# Patient Record
Sex: MALE | Race: OTHER | HISPANIC OR LATINO | URBAN - METROPOLITAN AREA
[De-identification: names, ages, dates, MRNs, and addresses within clinical notes are randomized per-mention and may not be internally consistent; named-entity substitution may affect disease eponyms.]

---

## 2024-04-25 ENCOUNTER — INPATIENT (INPATIENT)
Facility: HOSPITAL | Age: 59
LOS: 8 days | Discharge: HOME | DRG: 64 | End: 2024-05-04
Attending: PSYCHIATRY & NEUROLOGY | Admitting: PSYCHIATRY & NEUROLOGY
Payer: COMMERCIAL

## 2024-04-25 ENCOUNTER — TRANSCRIPTION ENCOUNTER (OUTPATIENT)
Age: 59
End: 2024-04-25

## 2024-04-25 VITALS — WEIGHT: 194.89 LBS

## 2024-04-25 DIAGNOSIS — I61.9 NONTRAUMATIC INTRACEREBRAL HEMORRHAGE, UNSPECIFIED: ICD-10-CM

## 2024-04-25 LAB
A1C WITH ESTIMATED AVERAGE GLUCOSE RESULT: 5.8 % — HIGH (ref 4–5.6)
ALBUMIN SERPL ELPH-MCNC: 3.9 G/DL — SIGNIFICANT CHANGE UP (ref 3.3–5.2)
ALP SERPL-CCNC: 86 U/L — SIGNIFICANT CHANGE UP (ref 40–120)
ALT FLD-CCNC: 14 U/L — SIGNIFICANT CHANGE UP
ANION GAP SERPL CALC-SCNC: 13 MMOL/L — SIGNIFICANT CHANGE UP (ref 5–17)
APTT BLD: 29.4 SEC — SIGNIFICANT CHANGE UP (ref 24.5–35.6)
AST SERPL-CCNC: 22 U/L — SIGNIFICANT CHANGE UP
BASOPHILS # BLD AUTO: 0.05 K/UL — SIGNIFICANT CHANGE UP (ref 0–0.2)
BASOPHILS NFR BLD AUTO: 0.7 % — SIGNIFICANT CHANGE UP (ref 0–2)
BILIRUB SERPL-MCNC: 0.3 MG/DL — LOW (ref 0.4–2)
BLD GP AB SCN SERPL QL: SIGNIFICANT CHANGE UP
BUN SERPL-MCNC: 19.3 MG/DL — SIGNIFICANT CHANGE UP (ref 8–20)
CALCIUM SERPL-MCNC: 8.8 MG/DL — SIGNIFICANT CHANGE UP (ref 8.4–10.5)
CHLORIDE SERPL-SCNC: 104 MMOL/L — SIGNIFICANT CHANGE UP (ref 96–108)
CHOLEST SERPL-MCNC: 156 MG/DL — SIGNIFICANT CHANGE UP
CO2 SERPL-SCNC: 23 MMOL/L — SIGNIFICANT CHANGE UP (ref 22–29)
CREAT SERPL-MCNC: 0.86 MG/DL — SIGNIFICANT CHANGE UP (ref 0.5–1.3)
EGFR: 102 ML/MIN/1.73M2 — SIGNIFICANT CHANGE UP
EOSINOPHIL # BLD AUTO: 0.18 K/UL — SIGNIFICANT CHANGE UP (ref 0–0.5)
EOSINOPHIL NFR BLD AUTO: 2.7 % — SIGNIFICANT CHANGE UP (ref 0–6)
ESTIMATED AVERAGE GLUCOSE: 120 MG/DL — HIGH (ref 68–114)
GLUCOSE BLDC GLUCOMTR-MCNC: 105 MG/DL — HIGH (ref 70–99)
GLUCOSE BLDC GLUCOMTR-MCNC: 111 MG/DL — HIGH (ref 70–99)
GLUCOSE BLDC GLUCOMTR-MCNC: 93 MG/DL — SIGNIFICANT CHANGE UP (ref 70–99)
GLUCOSE BLDC GLUCOMTR-MCNC: 99 MG/DL — SIGNIFICANT CHANGE UP (ref 70–99)
GLUCOSE SERPL-MCNC: 118 MG/DL — HIGH (ref 70–99)
HCT VFR BLD CALC: 41.8 % — SIGNIFICANT CHANGE UP (ref 39–50)
HDLC SERPL-MCNC: 48 MG/DL — SIGNIFICANT CHANGE UP
HGB BLD-MCNC: 14.1 G/DL — SIGNIFICANT CHANGE UP (ref 13–17)
IMM GRANULOCYTES NFR BLD AUTO: 0.1 % — SIGNIFICANT CHANGE UP (ref 0–0.9)
INR BLD: 1.03 RATIO — SIGNIFICANT CHANGE UP (ref 0.85–1.18)
LIPID PNL WITH DIRECT LDL SERPL: 96 MG/DL — SIGNIFICANT CHANGE UP
LYMPHOCYTES # BLD AUTO: 1.56 K/UL — SIGNIFICANT CHANGE UP (ref 1–3.3)
LYMPHOCYTES # BLD AUTO: 23.1 % — SIGNIFICANT CHANGE UP (ref 13–44)
MCHC RBC-ENTMCNC: 29.7 PG — SIGNIFICANT CHANGE UP (ref 27–34)
MCHC RBC-ENTMCNC: 33.7 GM/DL — SIGNIFICANT CHANGE UP (ref 32–36)
MCV RBC AUTO: 88.2 FL — SIGNIFICANT CHANGE UP (ref 80–100)
MONOCYTES # BLD AUTO: 0.67 K/UL — SIGNIFICANT CHANGE UP (ref 0–0.9)
MONOCYTES NFR BLD AUTO: 9.9 % — SIGNIFICANT CHANGE UP (ref 2–14)
MRSA PCR RESULT.: SIGNIFICANT CHANGE UP
NEUTROPHILS # BLD AUTO: 4.27 K/UL — SIGNIFICANT CHANGE UP (ref 1.8–7.4)
NEUTROPHILS NFR BLD AUTO: 63.5 % — SIGNIFICANT CHANGE UP (ref 43–77)
NON HDL CHOLESTEROL: 108 MG/DL — SIGNIFICANT CHANGE UP
PLATELET # BLD AUTO: 183 K/UL — SIGNIFICANT CHANGE UP (ref 150–400)
PLATELET RESPONSE ASPIRIN RESULT: 452 ARU — SIGNIFICANT CHANGE UP
POTASSIUM SERPL-MCNC: 3.8 MMOL/L — SIGNIFICANT CHANGE UP (ref 3.5–5.3)
POTASSIUM SERPL-SCNC: 3.8 MMOL/L — SIGNIFICANT CHANGE UP (ref 3.5–5.3)
PROT SERPL-MCNC: 6.7 G/DL — SIGNIFICANT CHANGE UP (ref 6.6–8.7)
PROTHROM AB SERPL-ACNC: 11.4 SEC — SIGNIFICANT CHANGE UP (ref 9.5–13)
RBC # BLD: 4.74 M/UL — SIGNIFICANT CHANGE UP (ref 4.2–5.8)
RBC # FLD: 14.5 % — SIGNIFICANT CHANGE UP (ref 10.3–14.5)
S AUREUS DNA NOSE QL NAA+PROBE: SIGNIFICANT CHANGE UP
SODIUM SERPL-SCNC: 140 MMOL/L — SIGNIFICANT CHANGE UP (ref 135–145)
TRIGL SERPL-MCNC: 59 MG/DL — SIGNIFICANT CHANGE UP
TROPONIN T, HIGH SENSITIVITY RESULT: 27 NG/L — SIGNIFICANT CHANGE UP (ref 0–51)
TSH SERPL-MCNC: 1.39 UIU/ML — SIGNIFICANT CHANGE UP (ref 0.27–4.2)
WBC # BLD: 6.74 K/UL — SIGNIFICANT CHANGE UP (ref 3.8–10.5)
WBC # FLD AUTO: 6.74 K/UL — SIGNIFICANT CHANGE UP (ref 3.8–10.5)

## 2024-04-25 PROCEDURE — 99255 IP/OBS CONSLTJ NEW/EST HI 80: CPT

## 2024-04-25 PROCEDURE — 70498 CT ANGIOGRAPHY NECK: CPT | Mod: 26,MC

## 2024-04-25 PROCEDURE — 99291 CRITICAL CARE FIRST HOUR: CPT

## 2024-04-25 PROCEDURE — 70496 CT ANGIOGRAPHY HEAD: CPT | Mod: 26,MC

## 2024-04-25 PROCEDURE — 93010 ELECTROCARDIOGRAM REPORT: CPT

## 2024-04-25 PROCEDURE — 70450 CT HEAD/BRAIN W/O DYE: CPT | Mod: 26,77

## 2024-04-25 PROCEDURE — 70450 CT HEAD/BRAIN W/O DYE: CPT | Mod: 26,MC,59

## 2024-04-25 PROCEDURE — 99223 1ST HOSP IP/OBS HIGH 75: CPT

## 2024-04-25 RX ORDER — DEXTROSE 50 % IN WATER 50 %
25 SYRINGE (ML) INTRAVENOUS ONCE
Refills: 0 | Status: DISCONTINUED | OUTPATIENT
Start: 2024-04-25 | End: 2024-05-04

## 2024-04-25 RX ORDER — HYDRALAZINE HCL 50 MG
10 TABLET ORAL
Refills: 0 | Status: DISCONTINUED | OUTPATIENT
Start: 2024-04-25 | End: 2024-05-03

## 2024-04-25 RX ORDER — INSULIN LISPRO 100/ML
VIAL (ML) SUBCUTANEOUS
Refills: 0 | Status: DISCONTINUED | OUTPATIENT
Start: 2024-04-25 | End: 2024-05-04

## 2024-04-25 RX ORDER — DEXTROSE 10 % IN WATER 10 %
125 INTRAVENOUS SOLUTION INTRAVENOUS ONCE
Refills: 0 | Status: DISCONTINUED | OUTPATIENT
Start: 2024-04-25 | End: 2024-05-04

## 2024-04-25 RX ORDER — LABETALOL HCL 100 MG
10 TABLET ORAL
Refills: 0 | Status: DISCONTINUED | OUTPATIENT
Start: 2024-04-25 | End: 2024-05-03

## 2024-04-25 RX ORDER — DEXTROSE 50 % IN WATER 50 %
12.5 SYRINGE (ML) INTRAVENOUS ONCE
Refills: 0 | Status: DISCONTINUED | OUTPATIENT
Start: 2024-04-25 | End: 2024-05-04

## 2024-04-25 RX ORDER — LEVETIRACETAM 250 MG/1
500 TABLET, FILM COATED ORAL EVERY 12 HOURS
Refills: 0 | Status: DISCONTINUED | OUTPATIENT
Start: 2024-04-25 | End: 2024-04-25

## 2024-04-25 RX ORDER — NICARDIPINE HYDROCHLORIDE 30 MG/1
5 CAPSULE, EXTENDED RELEASE ORAL
Qty: 40 | Refills: 0 | Status: DISCONTINUED | OUTPATIENT
Start: 2024-04-25 | End: 2024-04-25

## 2024-04-25 RX ORDER — SODIUM CHLORIDE 9 MG/ML
1000 INJECTION, SOLUTION INTRAVENOUS
Refills: 0 | Status: DISCONTINUED | OUTPATIENT
Start: 2024-04-25 | End: 2024-05-04

## 2024-04-25 RX ORDER — CHLORHEXIDINE GLUCONATE 213 G/1000ML
1 SOLUTION TOPICAL DAILY
Refills: 0 | Status: DISCONTINUED | OUTPATIENT
Start: 2024-04-25 | End: 2024-05-01

## 2024-04-25 RX ORDER — DEXTROSE 50 % IN WATER 50 %
15 SYRINGE (ML) INTRAVENOUS ONCE
Refills: 0 | Status: DISCONTINUED | OUTPATIENT
Start: 2024-04-25 | End: 2024-05-04

## 2024-04-25 RX ORDER — GLUCAGON INJECTION, SOLUTION 0.5 MG/.1ML
1 INJECTION, SOLUTION SUBCUTANEOUS ONCE
Refills: 0 | Status: DISCONTINUED | OUTPATIENT
Start: 2024-04-25 | End: 2024-05-04

## 2024-04-25 RX ORDER — SODIUM CHLORIDE 9 MG/ML
1000 INJECTION INTRAMUSCULAR; INTRAVENOUS; SUBCUTANEOUS
Refills: 0 | Status: DISCONTINUED | OUTPATIENT
Start: 2024-04-25 | End: 2024-05-03

## 2024-04-25 RX ADMIN — LEVETIRACETAM 400 MILLIGRAM(S): 250 TABLET, FILM COATED ORAL at 06:32

## 2024-04-25 RX ADMIN — SODIUM CHLORIDE 60 MILLILITER(S): 9 INJECTION INTRAMUSCULAR; INTRAVENOUS; SUBCUTANEOUS at 06:32

## 2024-04-25 RX ADMIN — NICARDIPINE HYDROCHLORIDE 25 MG/HR: 30 CAPSULE, EXTENDED RELEASE ORAL at 04:30

## 2024-04-25 RX ADMIN — NICARDIPINE HYDROCHLORIDE 25 MG/HR: 30 CAPSULE, EXTENDED RELEASE ORAL at 06:00

## 2024-04-25 NOTE — ED ADULT TRIAGE NOTE - CHIEF COMPLAINT QUOTE
BIBEMS for right sided weakness, right sided facial droop and altered mental status. Pt is responsive to painful stimuli in triage. LKW 1 hour PTA (03:15AM). Code Stroke called, Md at bedside. Pt taken directly to CT scan.

## 2024-04-25 NOTE — CONSULT NOTE ADULT - ASSESSMENT
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  Per notes, was headed into work and his co-workers noted he suddenly stopped working and had a facial droop.  Initial /137. CTH showed LEFT thalamic IPH. Patient started on Cardene drip and sent to Neuro ICU.  CTA head and neck neg except for stenosis of the R P3 segment, but no vascular malformation or aneurysms.     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   - consult PT/OT/SLP

## 2024-04-25 NOTE — H&P ADULT - ASSESSMENT
A/P:	  58M unknown PMHx developed sudden onset right-sided weakness and right facial droop, found to have L thalamic IPH. CTA H/N reports short segment severe stenosis/occlusion of right proximal P3 branch. NIHSS on arrival 17. Pre hospital MRS 0. ICH score 2.    PLAN:    Neuro:  - HOB 30 degrees  - Neuro checks q 1 hours, vital checks q 41hours  - CTH 6 hour stability scan (~10am)  - Pain control PRN  - Keppra 500 mg Q12 for seizure prophylaxis x total 7 days  - Stroke consult  - NeuroSx following for hemicrani watch     Respiratory:  - Incentive spirometry    CV:  - Normotensive goals  - SBP goal < 160  - Cardene drip  - PRN hydralazine and labetolol   - Check TTE  - Check lipids    Endocrine:  - Goal euglycemia  - check A1c and TSH  - ISS for now    Heme/Onc:  - DVT ppx SCD  - Hold Asa  - Check ARU given unclear if patient takes asa daily     Renal:  - NS@ 60ml/hour; IV lock when tolerating diet   - monitor and supplement lytes as needed     ID:  - Afebrile     GI:  - NPO pending stability scan and dysphagia screen  - Start bowel regimen once tolerating PO    Activity:  - Increase as tolerated  - PT pending  - OT pending    Plan discussed with Dr. Nixon

## 2024-04-25 NOTE — PROGRESS NOTE ADULT - SUBJECTIVE AND OBJECTIVE BOX
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 Patient LKW was about 0200 when he was headed into work this am. 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 EMS called. Patient came in as code stroke. Per son patient takes asa daily for his heart and is unsure of any other medications or medical history. Son reports patient had been complaining of HA for about 2-3weeks. In the ED /137. CTH showed LEFT thalamic IPH. NIHSS at time of examination was 17. Patient started on Cardene drip. MRs 0. ICH score 2.     1A: Level of consciousness       +1= Arouses to minor stimulation      1B: Ask month and age        +2= Aphasic    1C: "Blink eyes" and "Squeeze Hands"       +2= Performs 0 tasks    2: Horizontal EOMs       0= Normal       3: Visual fields       0= No visual loss    4: Facial palsy (use grimace if obtunded)        +2= Partial paralysis ( lower face)      5A: Left arm motor drift (count out loud and use fingers to show count)       0= No drift x 10 seconds    5B: Right arm motor drift       +2= Some effort against gravity    6A: Left leg motor drift       0= No drift x 10 seconds    6B: Right leg motor drift       +2= Some effort against gravity         7: Limb ataxia (FNF/heel-shin)       0= Does not understand        8: Sensation       +1= mild-moderate loss: less sharp/more dull    9: Language/aphasia- describe the scene (on mechelle); name the items; read the sentences (on mechelle)       +2= Severe aphasia: fragmentary expression, inference needed, cannot identify materials    10: Dysarthria- read the words       +2= Severe dysarthria: unintelligible slurring or out of proportion to dysphasia        11: Extinction/inattention        +1= Extinction to b/l simultaneous stimulation      NIHHS= 17 (25 Apr 2024 05:12)        24hr EVENTS:      ROS: [ ]  Unable to assess due to mental status   All other systems negative    -----------------------------------------------------------------------------------------------------------------------------------------------------------------------------------  ICU Vital Signs Last 24 Hrs  T(C): 37 (25 Apr 2024 19:00), Max: 37 (25 Apr 2024 19:00)  T(F): 98.6 (25 Apr 2024 19:00), Max: 98.6 (25 Apr 2024 19:00)  HR: 72 (25 Apr 2024 19:00) (63 - 92)  BP: 135/83 (25 Apr 2024 19:00) (118/72 - 198/137)  BP(mean): 97 (25 Apr 2024 19:00) (97 - 119)  ABP: --  ABP(mean): --  RR: 20 (25 Apr 2024 19:00) (16 - 27)  SpO2: 99% (25 Apr 2024 19:00) (88% - 100%)    O2 Parameters below as of 25 Apr 2024 16:00  Patient On (Oxygen Delivery Method): nasal cannula  O2 Flow (L/min): 3          I&O's Summary    24 Apr 2024 07:01  -  25 Apr 2024 07:00  --------------------------------------------------------  IN: 244 mL / OUT: 0 mL / NET: 244 mL    25 Apr 2024 07:01  -  25 Apr 2024 22:27  --------------------------------------------------------  IN: 720 mL / OUT: 1730 mL / NET: -1010 mL        MEDICATIONS  (STANDING):  chlorhexidine 2% Cloths 1 Application(s) Topical daily  dextrose 10% Bolus 125 milliLiter(s) IV Bolus once  dextrose 5%. 1000 milliLiter(s) (100 mL/Hr) IV Continuous <Continuous>  dextrose 5%. 1000 milliLiter(s) (50 mL/Hr) IV Continuous <Continuous>  dextrose 50% Injectable 12.5 Gram(s) IV Push once  dextrose 50% Injectable 25 Gram(s) IV Push once  glucagon  Injectable 1 milliGRAM(s) IntraMuscular once  insulin lispro (ADMELOG) corrective regimen sliding scale   SubCutaneous three times a day before meals  sodium chloride 0.9%. 1000 milliLiter(s) (60 mL/Hr) IV Continuous <Continuous>      RESPIRATORY:        IMAGING:   Recent imaging studies were reviewed.    LAB RESULTS:                          14.1   6.74  )-----------( 183      ( 25 Apr 2024 04:10 )             41.8       PT/INR - ( 25 Apr 2024 04:10 )   PT: 11.4 sec;   INR: 1.03 ratio         PTT - ( 25 Apr 2024 04:10 )  PTT:29.4 sec    04-25    140  |  104  |  19.3  ----------------------------<  118<H>  3.8   |  23.0  |  0.86    Ca    8.8      25 Apr 2024 04:10    TPro  6.7  /  Alb  3.9  /  TBili  0.3<L>  /  DBili  x   /  AST  22  /  ALT  14  /  AlkPhos  86  04-25                -----------------------------------------------------------------------------------------------------------------------------------------------------------------------------------    PHYSICAL EXAM:  General: Calm   HEENT: MMM  Neuro:  -Mental status- No acute distress, AOx3, following commands  dysarthria, mild aphasia  -CN- PERRL 3mm, EOMI, tongue midline, R facial droop  L side 5/5  RUE 4+  RLE 4+    CV: RRR  Pulm: clear to auscultation  Abd: Soft, nontender, nondistended  Ext: no noted edema in lower ext  Skin: warm, dry

## 2024-04-25 NOTE — ED PROVIDER NOTE - PHYSICAL EXAMINATION
Gen: NAD  Head: NC/AT  Neck: trachea midline  Card: regular rate and rhythm  Resp:  CTAB  Abd: soft, non-distended, non-tender  Ext: no deformities above reported baseline  Neuro:  Awake, alert, R sided hemiplegia and R facial droop.  Skin:  Warm and dry as visualized  Psych:  Normal affect and mood

## 2024-04-25 NOTE — ED PROVIDER NOTE - OBJECTIVE STATEMENT
59 y/o M pt unknown PMHx bib EMS for R sided weakness and R facial droop onset 0315. pt brought directly to CT for stroke evaluation.

## 2024-04-25 NOTE — H&P ADULT - HISTORY OF PRESENT ILLNESS
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 Patient LKW was about 0200 when he was headed into work this am. 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 EMS called. Patient came in as code stroke. Per son patient takes asa daily for his heart and is unsure of any other medications or medical history. Son reports patient had been complaining of HA for about 2-3weeks. In the ED /137. CTH showed LEFT thalamic IPH. NIHSS at time of examination was 17. Patient started on Cardene drip. MRs 0. ICH score 2.     1A: Level of consciousness       +1= Arouses to minor stimulation      1B: Ask month and age        +2= Aphasic    1C: "Blink eyes" and "Squeeze Hands"       +2= Performs 0 tasks    2: Horizontal EOMs       0= Normal       3: Visual fields       0= No visual loss    4: Facial palsy (use grimace if obtunded)        +2= Partial paralysis ( lower face)      5A: Left arm motor drift (count out loud and use fingers to show count)       0= No drift x 10 seconds    5B: Right arm motor drift       +2= Some effort against gravity    6A: Left leg motor drift       0= No drift x 10 seconds    6B: Right leg motor drift       +2= Some effort against gravity         7: Limb ataxia (FNF/heel-shin)       0= Does not understand        8: Sensation       +1= mild-moderate loss: less sharp/more dull    9: Language/aphasia- describe the scene (on mechelle); name the items; read the sentences (on mechelle)       +2= Severe aphasia: fragmentary expression, inference needed, cannot identify materials    10: Dysarthria- read the words       +2= Severe dysarthria: unintelligible slurring or out of proportion to dysphasia        11: Extinction/inattention        +1= Extinction to b/l simultaneous stimulation      NIHHS= 17

## 2024-04-25 NOTE — DISCHARGE NOTE NURSING/CASE MANAGEMENT/SOCIAL WORK - PATIENT PORTAL LINK FT
You can access the FollowMyHealth Patient Portal offered by [facility] by registering at the following website: [email or web address]. By joining [facility]’s FollowMyHealth portal, you will also be able to view your health information using other applications (apps) compatible with our system.

## 2024-04-25 NOTE — SWALLOW BEDSIDE ASSESSMENT ADULT - SLP PERTINENT HISTORY OF CURRENT PROBLEM
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 Patient LKW was about 0200 when he was headed into work this am. 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 EMS called. Patient came in as code stroke. Per son patient takes asa daily for his heart and is unsure of any other medications or medical history. Son reports patient had been complaining of HA for about 2-3weeks. In the ED /137. CTH showed LEFT thalamic IPH. NIHSS at time of examination was 17. Patient started on Cardene drip. MRs 0. ICH score 2. "

## 2024-04-25 NOTE — ED ADULT NURSE NOTE - OBJECTIVE STATEMENT
Pt presents to ED c/o intracerebral hemorrhage. NIH 16. Per pt's family last well known 0230. Moderate aphasia, right sided weakness and facial droop. Left pupil round reactive. PM hx trauma to right pupil. Pt transported from CT to critical 6. Pt connected to cardiac monitor-NSR HR 88 on 3 L NC SpO2 93%. Pt hypertensive on arrival. Started on nicardipine drip at 7.5 mg/hr. /78 at this time. Pt presents to ED c/o intracerebral hemorrhage. NIH 16. Per pt's family last well known 0315. Moderate aphasia, right sided weakness and facial droop. Left pupil round reactive. PM hx trauma to right pupil. Pt transported from CT to critical 6. Pt connected to cardiac monitor-NSR HR 88 on 3 L NC SpO2 93%. Pt hypertensive on arrival. Started on nicardipine drip at 7.5 mg/hr. /78 at this time.

## 2024-04-25 NOTE — ED PROVIDER NOTE - ATTENDING CONTRIBUTION TO CARE
Lenin: I performed a face to face evaluation of this patient and performed a full history and physical examination on the patient.  I agree with the resident's history, physical examination, and plan of the patient unless otherwise noted. My brief assessment is as follows: unknown pmh biba s/p acute ams/paralysis on right side starting around 3:15a while working. person pt was working with called ems right away. pt arrives not speaking, awake but slightly sleepy, not moving right side with right facial droop. hypertense. sent straight to ct found to have left bleed, neurosurg consulted, bp control, neuro ICU.

## 2024-04-25 NOTE — ED ADULT NURSE NOTE - NSFALLUNIVINTERV_ED_ALL_ED
Bed/Stretcher in lowest position, wheels locked, appropriate side rails in place/Call bell, personal items and telephone in reach/Instruct patient to call for assistance before getting out of bed/chair/stretcher/Non-slip footwear applied when patient is off stretcher/[place] to call system/Physically safe environment - no spills, clutter or unnecessary equipment/Purposeful proactive rounding/Room/bathroom lighting operational, light cord in reach

## 2024-04-25 NOTE — SWALLOW BEDSIDE ASSESSMENT ADULT - SWALLOW EVAL: DIAGNOSIS
Oral phase of swallow characterized by mildly reduced oral grading to utensil. +prolonged mastication w/ mild right sided buccal pocketing w/ soft/bite sized trials. Suspect pharyngeal dysphagia w/ all liquid consistencies trialed marked by multiple swallows and subjective increased WOB post intake. No overt s/s of penetration/aspiration w/ puree.

## 2024-04-25 NOTE — PROGRESS NOTE ADULT - TIME BILLING
I spent 40 minutes of critical care time examining patient, reviewing vitals, labs, medications, imaging and discussing with the team goals of care to prevent life-threatening in this patient who is at high risk for deterioration or death due to:  ICH

## 2024-04-25 NOTE — H&P ADULT - NSHPPHYSICALEXAM_GEN_ALL_CORE
GENERAL: NAD  HEAD:  Atraumatic, normocephalic  VANCE COMA SCORE: E-3 V-2 M-6 = 11  MENTAL STATUS: Opens eyes to voice, severely aphasic (expressive and receptive), following simple commands verses mimicking   CRANIAL NERVES:  RIGHT eye opacified from prior injury, L pupil 2mm reactive. EOMI without nystagmus. R facial droop.   MOTOR: LUE 5/5, LLE 5/5, R sided neglect - RLE moves spontaneously in plane of bed, RUE some effort against gravity  SENSATION: grossly intact to light touch all extremities  COORDINATION: Gait testing deferred  CHEST/LUNG: Non-labored breathing on room air  SKIN: Warm, dry

## 2024-04-25 NOTE — ED PROVIDER NOTE - IV ALTEPLASE ADMIN OUTSIDE HIDDEN
show show Hydroxychloroquine Pregnancy And Lactation Text: This medication has been shown to cause fetal harm but it isn't assigned a Pregnancy Risk Category. There are small amounts excreted in breast milk.

## 2024-04-25 NOTE — H&P ADULT - NSICDXFAMILYHX_GEN_ALL_CORE_FT
FAMILY HISTORY:  Mother  Still living? Unknown  FH: diabetes mellitus, Age at diagnosis: Age Unknown    Sibling  Still living? Unknown  FH: diabetes mellitus, Age at diagnosis: Age Unknown    Grandparent  Still living? Unknown  FH: diabetes mellitus, Age at diagnosis: Age Unknown

## 2024-04-25 NOTE — CONSULT NOTE ADULT - SUBJECTIVE AND OBJECTIVE BOX
[place] citizen Interpretor ID: [id number]                               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  Per notes, was headed into work and his co-workers noted he suddenly stopped working and had a facial droop.  Initial /137. CTH showed LEFT thalamic IPH. Patient started on Cardene drip and sent to Neuro ICU.          PAST MEDICAL & SURGICAL HISTORY:  Traumatic blindness  HTN      MEDICATIONS  (STANDING):  chlorhexidine 2% Cloths 1 Application(s) Topical daily  dextrose 10% Bolus 125 milliLiter(s) IV Bolus once  dextrose 5%. 1000 milliLiter(s) (50 mL/Hr) IV Continuous <Continuous>  dextrose 5%. 1000 milliLiter(s) (100 mL/Hr) IV Continuous <Continuous>  dextrose 50% Injectable 25 Gram(s) IV Push once  dextrose 50% Injectable 12.5 Gram(s) IV Push once  glucagon  Injectable 1 milliGRAM(s) IntraMuscular once  insulin lispro (ADMELOG) corrective regimen sliding scale   SubCutaneous three times a day before meals  sodium chloride 0.9%. 1000 milliLiter(s) (60 mL/Hr) IV Continuous <Continuous>    MEDICATIONS  (PRN):  dextrose Oral Gel 15 Gram(s) Oral once PRN Blood Glucose LESS THAN 70 milliGRAM(s)/deciliter  hydrALAZINE Injectable 10 milliGRAM(s) IV Push every 2 hours PRN SBP> 160  labetalol Injectable 10 milliGRAM(s) IV Push every 2 hours PRN SBP> 160      Allergies    No Known Allergies    Intolerances        SOCIAL HISTORY:  no tob,   no alcohol   no drugs    FAMILY HISTORY:  FH: diabetes mellitus (Mother, Sibling, Grandparent)          ROS: 14 point ROS negative other than what is present in HPI or below    Vital Signs Last 24 Hrs  T(C): 36.8 (25 Apr 2024 12:24), Max: 36.8 (25 Apr 2024 12:24)  T(F): 98.2 (25 Apr 2024 12:24), Max: 98.2 (25 Apr 2024 12:24)  HR: 66 (25 Apr 2024 12:00) (63 - 92)  BP: 140/93 (25 Apr 2024 12:00) (118/72 - 198/137)  BP(mean): 106 (25 Apr 2024 12:00) (98 - 113)  RR: 20 (25 Apr 2024 12:00) (16 - 27)  SpO2: 98% (25 Apr 2024 12:00) (88% - 99%)    Parameters below as of 25 Apr 2024 12:00  Patient On (Oxygen Delivery Method): nasal cannula  O2 Flow (L/min): 3        Physical Exam:  General: No acute distress.   HEENT: Head normocephalic, atraumatic. Conjunctivae clear w/o exudates or hemorrhage. Sclera non-icteric. Nares are patent bilaterally. Mucous membranes pink and moist.  No tonsillar swelling or exudates.    Neck: Supple, no adenopathy. Trachea midline. No JVD.  Cardiac: Normal rate and rhythm. S1, S2 auscultated. No murmurs, gallops, or rubs.     Respiratory: Lung sounds clear in all fields. Chest wall symmetric, nontender.   Abdominal: Soft, nondistended, nontender. Bowel sounds normoactive x 4 quadrants. No masses, hepatomegaly, or splenomegaly.   Skin: Skin is warm, dry and intact without rashes or lesions. Appropriate color for ethnicity. Nailbeds pink with no cyanosis or clubbing.   Extremities: No edema, 2+ peripheral pulses in b/l upper and b/l lower extremities. Full range of motion in all joints.     Detailed Neurologic Exam:    Mental status: The patient is drowsy but awakens. Speech is severely dysarthric but he follows commands, can tell me his age and year and month.     Cranial nerves: Pupils equal and react symmetrically to light. R eye blind. Extraocular motion is full with no nystagmus. There is no ptosis. R facial droop, Palate elevates symmetrically. Tongue is midline.    Motor: There is normal bulk and tone.  There is no tremor.  Strength is 2/5 in RUE, 3/5 in RLE  Strength is 5/5 in the left arm and leg.    Sensation: Intact to light touch and pin in 4 extremities    Reflexes: 1-2+ throughout and plantar responses are flexor.    Cerebellar: Cannot test on right    Gait : deferred        prehospital mRS= 0      LABS:                         14.1   6.74  )-----------( 183      ( 25 Apr 2024 04:10 )             41.8       04-25    140  |  104  |  19.3  ----------------------------<  118<H>  3.8   |  23.0  |  0.86    Ca    8.8      25 Apr 2024 04:10    TPro  6.7  /  Alb  3.9  /  TBili  0.3<L>  /  DBili  x   /  AST  22  /  ALT  14  /  AlkPhos  86  04-25      PT/INR - ( 25 Apr 2024 04:10 )   PT: 11.4 sec;   INR: 1.03 ratio         PTT - ( 25 Apr 2024 04:10 )  PTT:29.4 sec    04-25 Chol 156 LDL -- HDL 48 Trig 59    A1C:         RADIOLOGY & ADDITIONAL STUDIES (independently reviewed unless otherwise noted):  < from: CT Head No Cont (04.25.24 @ 10:31) >  1.  Grossly stable left thalamic hemorrhage.  2.  Midline shift to the right of approximately 0.4 cm.  3.  Chronic ischemic changes.  4.  Redemonstrated partially calcified masslike density in the right   nasal passages/ethmoid air cells, grossly stable.    < end of copied text >  < from: CT Angio Neck Stroke Protocol w/ IV Cont (04.25.24 @ 04:26) >  CTA NECK:  No significant stenosis of the cervical carotid arteries based   on NASCET criteria. Patent cervical vertebral arteries. No evidence of   cervical carotid or vertebral artery dissection.    Polypoid cystic and partially calcified mass in the right nasal passage   with protrudes into the knee nasopharynx and involves the right ethmoid   air cells. Near complete opacification of the right frontal sinus, right   ethmoid air cells, and right sphenoid sinus. Direct endoscopic   visualization is recommended. Correlation with outside prior imaging   would be helpful.    Mild pulmonary interstitial lung edema and small bilateral pleural   effusions.    CTA HEAD:  No high-grade stenosis or occlusion of the major proximal   arterial branches, however, short segment severe stenosis/occlusion of   the right posterior cerebral artery proximal P3 branch. No evidence of an   intracranial arterial aneurysm or arteriovenous malformation on CTA,   specifically, no evidence of vascular malformation in the region of the   left thalamic hemorrhage. No evidence of active extravasation of contrast   within the left thalamic parenchymal hemorrhage.    < end of copied text >

## 2024-04-25 NOTE — CONSULT NOTE ADULT - SUBJECTIVE AND OBJECTIVE BOX
HPI:      PAST MEDICAL & SURGICAL HISTORY:  ?heart disease     FAMILY HISTORY:  Diabetes      Allergies    No Known Allergies    Intolerances      REVIEW OF SYSTEMS  Negative except as noted in HPI      HOME MEDICATIONS:  Home Medications:      MEDICATIONS:  Antibiotics:    Neuro:  levETIRAcetam  IVPB 500 milliGRAM(s) IV Intermittent every 12 hours    Anticoagulation:    OTHER:  chlorhexidine 2% Cloths 1 Application(s) Topical daily  dextrose 50% Injectable 25 Gram(s) IV Push once  dextrose 50% Injectable 12.5 Gram(s) IV Push once  dextrose Oral Gel 15 Gram(s) Oral once PRN  glucagon  Injectable 1 milliGRAM(s) IntraMuscular once  hydrALAZINE Injectable 10 milliGRAM(s) IV Push every 2 hours PRN  insulin lispro (ADMELOG) corrective regimen sliding scale   SubCutaneous three times a day before meals  labetalol Injectable 10 milliGRAM(s) IV Push every 2 hours PRN  niCARdipine Infusion 5 mG/Hr IV Continuous <Continuous>    IVF:  dextrose 10% Bolus 125 milliLiter(s) IV Bolus once  dextrose 5%. 1000 milliLiter(s) IV Continuous <Continuous>  dextrose 5%. 1000 milliLiter(s) IV Continuous <Continuous>  sodium chloride 0.9%. 1000 milliLiter(s) IV Continuous <Continuous>      Vital Signs Last 24 Hrs  T(C): 36.6 (25 Apr 2024 05:33), Max: 36.6 (25 Apr 2024 05:33)  T(F): 97.9 (25 Apr 2024 05:33), Max: 97.9 (25 Apr 2024 05:33)  HR: 76 (25 Apr 2024 05:33) (69 - 92)  BP: 118/72 (25 Apr 2024 05:33) (118/72 - 198/137)  BP(mean): --  RR: 18 (25 Apr 2024 05:33) (16 - 27)  SpO2: 92% (25 Apr 2024 05:33) (88% - 96%)    Parameters below as of 25 Apr 2024 05:33  Patient On (Oxygen Delivery Method): nasal cannula  O2 Flow (L/min): 4      PHYSICAL EXAM:  GENERAL: NAD  HEAD:  Atraumatic, normocephalic  VANCE COMA SCORE: E-3 V-2 M-6 = 11  MENTAL STATUS: Opens eyes to voice, severely aphasic (expressive and receptive), following simple commands  CRANIAL NERVES:  PERRL. EOMI without nystagmus. R facial droop.   MOTOR: LIUE 5/5, LLE antigravity, R sided neglect - RLE moves spontaneously in plane of bed, RUE some effort against gravity  SENSATION: grossly intact to light touch all extremities  COORDINATION: Gait testing deferred  CHEST/LUNG: Non-labored breathing on room air  SKIN: Warm, dry    LABS:                        14.1   6.74  )-----------( 183      ( 25 Apr 2024 04:10 )             41.8     04-25    140  |  104  |  19.3  ----------------------------<  118<H>  3.8   |  23.0  |  0.86    Ca    8.8      25 Apr 2024 04:10    TPro  6.7  /  Alb  3.9  /  TBili  0.3<L>  /  DBili  x   /  AST  22  /  ALT  14  /  AlkPhos  86  04-25    PT/INR - ( 25 Apr 2024 04:10 )   PT: 11.4 sec;   INR: 1.03 ratio         PTT - ( 25 Apr 2024 04:10 )  PTT:29.4 sec  Urinalysis Basic - ( 25 Apr 2024 04:10 )    Color: x / Appearance: x / SG: x / pH: x  Gluc: 118 mg/dL / Ketone: x  / Bili: x / Urobili: x   Blood: x / Protein: x / Nitrite: x   Leuk Esterase: x / RBC: x / WBC x   Sq Epi: x / Non Sq Epi: x / Bacteria: x        CULTURES:      RADIOLOGY & ADDITIONAL STUDIES:    < from: CT Brain Stroke Protocol (04.25.24 @ 04:17) >  IMPRESSION:    Acute left thalamic parenchymal hemorrhage with rim of perihematoma edema   measuring 2.9 x 1.9 x 3.7 cm with mass effect upon the left lateral   ventricle and 5 mm shift of the midline towards the right. Trace   intraventricular hemorrhage in the left occipital horn.    Moderate to severe chronic small vessel ischemic changes in the   frontoparietal white matter, bilateral basal ganglionic lacunar infarcts,   and small chronic appearing infarct in the right cerebellum.    Polypoid cystic and partially calcified masslike density in the right   nasal passage with involvement of the right ethmoid air cells. Near   complete opacification of the right frontal sinus, right ethmoid air   cells, and right sphenoid sinus.    < end of copied text >    < from: CT Angio Brain Stroke Protocol  w/ IV Cont (04.25.24 @ 04:26) >  IMPRESSION:    CTA NECK:  No significant stenosis of the cervical carotid arteries based   on NASCET criteria. Patent cervical vertebral arteries. No evidence of   cervical carotid or vertebral artery dissection.    Polypoid cystic and partially calcified mass in the right nasal passage   with protrudes into the knee nasopharynx and involves the right ethmoid   air cells. Near complete opacification of the right frontal sinus, right   ethmoid air cells, and right sphenoid sinus. Direct endoscopic   visualization is recommended. Correlation with outside prior imaging   would be helpful.    Mild pulmonary interstitial lung edema and small bilateral pleural   effusions.    CTA HEAD:  No high-grade stenosis or occlusion of the major proximal   arterial branches, however, short segment severe stenosis/occlusion of   the right posterior cerebral artery proximal P3 branch. No evidence of an   intracranial arterial aneurysm or arteriovenous malformation on CTA,   specifically, no evidence of vascular malformation in the region of the   left thalamic hemorrhage. No evidence of active extravasation of contrast   within the left thalamic parenchymal hemorrhage.      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 Patient LKW was about 0200 when he was headed into work this am. 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 EMS called. Patient came in as code stroke. Per son patient takes asa daily for his heart and is unsure of any other medications or medical history. Son reports patient had been complaining of HA for about 2-3weeks. In the ED /137. CTH showed LEFT thalamic IPH. NIHSS at time of examination was 17. Patient started on Cardene drip. MRs 0. ICH score 2.     1A: Level of consciousness       +1= Arouses to minor stimulation      1B: Ask month and age        +2= Aphasic    1C: "Blink eyes" and "Squeeze Hands"       +2= Performs 0 tasks    2: Horizontal EOMs       0= Normal       3: Visual fields       0= No visual loss    4: Facial palsy (use grimace if obtunded)        +2= Partial paralysis ( lower face)      5A: Left arm motor drift (count out loud and use fingers to show count)       0= No drift x 10 seconds    5B: Right arm motor drift       +2= Some effort against gravity    6A: Left leg motor drift       0= No drift x 10 seconds    6B: Right leg motor drift       +2= Some effort against gravity         7: Limb ataxia (FNF/heel-shin)       0= Does not understand        8: Sensation       +1= mild-moderate loss: less sharp/more dull    9: Language/aphasia- describe the scene (on mechelle); name the items; read the sentences (on mechelle)       +2= Severe aphasia: fragmentary expression, inference needed, cannot identify materials    10: Dysarthria- read the words       +2= Severe dysarthria: unintelligible slurring or out of proportion to dysphasia        11: Extinction/inattention        +1= Extinction to b/l simultaneous stimulation    NIHHS= 17      PAST MEDICAL & SURGICAL HISTORY:  ?heart disease on ASA     FAMILY HISTORY:  Diabetes      Allergies    No Known Allergies    Intolerances      REVIEW OF SYSTEMS  Negative except as noted in HPI      HOME MEDICATIONS:  Home Medications:      MEDICATIONS:  Antibiotics:    Neuro:  levETIRAcetam  IVPB 500 milliGRAM(s) IV Intermittent every 12 hours    Anticoagulation:    OTHER:  chlorhexidine 2% Cloths 1 Application(s) Topical daily  dextrose 50% Injectable 25 Gram(s) IV Push once  dextrose 50% Injectable 12.5 Gram(s) IV Push once  dextrose Oral Gel 15 Gram(s) Oral once PRN  glucagon  Injectable 1 milliGRAM(s) IntraMuscular once  hydrALAZINE Injectable 10 milliGRAM(s) IV Push every 2 hours PRN  insulin lispro (ADMELOG) corrective regimen sliding scale   SubCutaneous three times a day before meals  labetalol Injectable 10 milliGRAM(s) IV Push every 2 hours PRN  niCARdipine Infusion 5 mG/Hr IV Continuous <Continuous>    IVF:  dextrose 10% Bolus 125 milliLiter(s) IV Bolus once  dextrose 5%. 1000 milliLiter(s) IV Continuous <Continuous>  dextrose 5%. 1000 milliLiter(s) IV Continuous <Continuous>  sodium chloride 0.9%. 1000 milliLiter(s) IV Continuous <Continuous>      Vital Signs Last 24 Hrs  T(C): 36.6 (25 Apr 2024 05:33), Max: 36.6 (25 Apr 2024 05:33)  T(F): 97.9 (25 Apr 2024 05:33), Max: 97.9 (25 Apr 2024 05:33)  HR: 76 (25 Apr 2024 05:33) (69 - 92)  BP: 118/72 (25 Apr 2024 05:33) (118/72 - 198/137)  BP(mean): --  RR: 18 (25 Apr 2024 05:33) (16 - 27)  SpO2: 92% (25 Apr 2024 05:33) (88% - 96%)    Parameters below as of 25 Apr 2024 05:33  Patient On (Oxygen Delivery Method): nasal cannula  O2 Flow (L/min): 4      PHYSICAL EXAM:  GENERAL: NAD  HEAD:  Atraumatic, normocephalic  VANCE COMA SCORE: E-3 V-2 M-6 = 11  MENTAL STATUS: Opens eyes to voice, severely aphasic (expressive and receptive), following simple commands  CRANIAL NERVES:  PERRL. EOMI without nystagmus. R facial droop.   MOTOR: LIUE 5/5, LLE antigravity, R sided neglect - RLE moves spontaneously in plane of bed, RUE some effort against gravity  SENSATION: grossly intact to light touch all extremities  COORDINATION: Gait testing deferred  CHEST/LUNG: Non-labored breathing on room air  SKIN: Warm, dry    LABS:                        14.1   6.74  )-----------( 183      ( 25 Apr 2024 04:10 )             41.8     04-25    140  |  104  |  19.3  ----------------------------<  118<H>  3.8   |  23.0  |  0.86    Ca    8.8      25 Apr 2024 04:10    TPro  6.7  /  Alb  3.9  /  TBili  0.3<L>  /  DBili  x   /  AST  22  /  ALT  14  /  AlkPhos  86  04-25    PT/INR - ( 25 Apr 2024 04:10 )   PT: 11.4 sec;   INR: 1.03 ratio         PTT - ( 25 Apr 2024 04:10 )  PTT:29.4 sec  Urinalysis Basic - ( 25 Apr 2024 04:10 )    Color: x / Appearance: x / SG: x / pH: x  Gluc: 118 mg/dL / Ketone: x  / Bili: x / Urobili: x   Blood: x / Protein: x / Nitrite: x   Leuk Esterase: x / RBC: x / WBC x   Sq Epi: x / Non Sq Epi: x / Bacteria: x        CULTURES:      RADIOLOGY & ADDITIONAL STUDIES:    < from: CT Brain Stroke Protocol (04.25.24 @ 04:17) >  IMPRESSION:    Acute left thalamic parenchymal hemorrhage with rim of perihematoma edema   measuring 2.9 x 1.9 x 3.7 cm with mass effect upon the left lateral   ventricle and 5 mm shift of the midline towards the right. Trace   intraventricular hemorrhage in the left occipital horn.    Moderate to severe chronic small vessel ischemic changes in the   frontoparietal white matter, bilateral basal ganglionic lacunar infarcts,   and small chronic appearing infarct in the right cerebellum.    Polypoid cystic and partially calcified masslike density in the right   nasal passage with involvement of the right ethmoid air cells. Near   complete opacification of the right frontal sinus, right ethmoid air   cells, and right sphenoid sinus.    < end of copied text >    < from: CT Angio Brain Stroke Protocol  w/ IV Cont (04.25.24 @ 04:26) >  IMPRESSION:    CTA NECK:  No significant stenosis of the cervical carotid arteries based   on NASCET criteria. Patent cervical vertebral arteries. No evidence of   cervical carotid or vertebral artery dissection.    Polypoid cystic and partially calcified mass in the right nasal passage   with protrudes into the knee nasopharynx and involves the right ethmoid   air cells. Near complete opacification of the right frontal sinus, right   ethmoid air cells, and right sphenoid sinus. Direct endoscopic   visualization is recommended. Correlation with outside prior imaging   would be helpful.    Mild pulmonary interstitial lung edema and small bilateral pleural   effusions.    CTA HEAD:  No high-grade stenosis or occlusion of the major proximal   arterial branches, however, short segment severe stenosis/occlusion of   the right posterior cerebral artery proximal P3 branch. No evidence of an   intracranial arterial aneurysm or arteriovenous malformation on CTA,   specifically, no evidence of vascular malformation in the region of the   left thalamic hemorrhage. No evidence of active extravasation of contrast   within the left thalamic parenchymal hemorrhage.      < end of copied text >

## 2024-04-25 NOTE — PROGRESS NOTE ADULT - ASSESSMENT
58M unknown PMHx developed sudden onset right-sided weakness and right facial droop, found to have L thalamic IPH. CTA H/N reports short segment severe stenosis/occlusion of right proximal P3 branch. NIHSS on arrival 17. Pre hospital MRS 0. ICH score 2.    PLAN:    Neuro:  - HOB 30 degrees  - Neuro checks q2h  - Pain control PRN  - Stroke consulted  -  puree diet  - SBP<160  - hold AP/AC

## 2024-04-25 NOTE — SWALLOW BEDSIDE ASSESSMENT ADULT - ORAL PHASE
Within functional limits diffuse oral stasis/Decreased anterior-posterior movement of the bolus Decreased anterior-posterior movement of the bolus/Stasis in lateral sulci

## 2024-04-25 NOTE — DISCHARGE NOTE NURSING/CASE MANAGEMENT/SOCIAL WORK - NSDCFUADDAPPT_GEN_ALL_CORE_FT
Monday 5/6/24 1:45 pm  Dr Apollo Ventura  [address]  [phone]  If unable to keep appointment please call office as soon as possible to reschedule

## 2024-04-25 NOTE — SWALLOW BEDSIDE ASSESSMENT ADULT - SLP GENERAL OBSERVATIONS
Pt recd a&ox1, dysarthric w/ poor speech intelligibility, 4L NC in place, SpO2 @ 99% for duration of assessment, NAD, 0/10 pain scale pre/post, pts son @ the bedside, Yoruba language line #[id number] utilized.

## 2024-04-25 NOTE — CONSULT NOTE ADULT - ASSESSMENT
Assessment:  58M unknown PMHx developed sudden onset right-sided weakness and right facial droop, found to have L thalamic IPH. CTA H/N reports short segment severe stenosis/occlusion of right proximal P3 branch.    Plan:  - q1h neuro checks  - repeat 6hr CTH @ 10AM  - SBP < 160; on cardene gtt  - Keppra 500 BID for seizure ppx  - stroke neurology consult  - DVT ppx: SCDs only, hold chemoppx in setting of hemorrhage   - Supportive care/further medical management per NSICU  - To be discussed with Dr. Feldman  Assessment:  58M unknown PMHx developed sudden onset right-sided weakness and right facial droop, found to have L thalamic IPH. CTA H/N reports short segment severe stenosis/occlusion of right proximal P3 branch.    Plan:  - q1h neuro checks  - repeat 6hr CTH @ 10AM  - SBP < 160; on cardene gtt  - Keppra 500 BID for seizure ppx  - ARU  - stroke neurology consult  - DVT ppx: SCDs only, hold chemoppx in setting of hemorrhage   - Supportive care/further medical management per NSICU  - To be discussed with Dr. Feldman

## 2024-04-25 NOTE — H&P ADULT - NSHPLABSRESULTS_GEN_ALL_CORE
< from: CT Brain Stroke Protocol (04.25.24 @ 04:17) >    IMPRESSION:    Acute left thalamic parenchymal hemorrhage with rim of perihematoma edema   measuring 2.9 x 1.9 x 3.7 cm with mass effect upon the left lateral   ventricle and 5 mm shift of the midline towards the right. Trace   intraventricular hemorrhage in the left occipital horn.    Moderate to severe chronic small vessel ischemic changes in the   frontoparietal white matter, bilateral basal ganglionic lacunar infarcts,   and small chronic appearing infarct in the right cerebellum.    Polypoid cystic and partially calcified masslike density in the right   nasal passage with involvement of the right ethmoid air cells. Near   complete opacification of the right frontal sinus, right ethmoid air   cells, and right sphenoid sinus.    < end of copied text > < from: CT Brain Stroke Protocol (04.25.24 @ 04:17) >    IMPRESSION:    Acute left thalamic parenchymal hemorrhage with rim of perihematoma edema   measuring 2.9 x 1.9 x 3.7 cm with mass effect upon the left lateral   ventricle and 5 mm shift of the midline towards the right. Trace   intraventricular hemorrhage in the left occipital horn.    Moderate to severe chronic small vessel ischemic changes in the   frontoparietal white matter, bilateral basal ganglionic lacunar infarcts,   and small chronic appearing infarct in the right cerebellum.    Polypoid cystic and partially calcified masslike density in the right   nasal passage with involvement of the right ethmoid air cells. Near   complete opacification of the right frontal sinus, right ethmoid air   cells, and right sphenoid sinus.    < end of copied text >    < from: CT Angio Neck Stroke Protocol w/ IV Cont (04.25.24 @ 04:26) >    IMPRESSION:    CTA NECK:  No significant stenosis of the cervical carotid arteries based   on NASCET criteria. Patent cervical vertebral arteries. No evidence of   cervical carotid or vertebral artery dissection.    Polypoid cystic and partially calcified mass in the right nasal passage   with protrudes into the knee nasopharynx and involves the right ethmoid   air cells. Near complete opacification of the right frontal sinus, right   ethmoid air cells, and right sphenoid sinus. Direct endoscopic   visualization is recommended. Correlation with outside prior imaging   would be helpful.    Mild pulmonary interstitial lung edema and small bilateral pleural   effusions.    CTA HEAD:  No high-grade stenosis or occlusion of the major proximal   arterial branches, however, short segment severe stenosis/occlusion of   the right posterior cerebral artery proximal P3 branch. No evidence of an   intracranial arterial aneurysm or arteriovenous malformation on CTA,   specifically, no evidence of vascular malformation in the region of the   left thalamic hemorrhage. No evidence of active extravasation of contrast   within the left thalamic parenchymal hemorrhage.    < end of copied text >                          14.1   6.74  )-----------( 183      ( 25 Apr 2024 04:10 )             41.8   04-25    140  |  104  |  19.3  ----------------------------<  118<H>  3.8   |  23.0  |  0.86    Ca    8.8      25 Apr 2024 04:10    TPro  6.7  /  Alb  3.9  /  TBili  0.3<L>  /  DBili  x   /  AST  22  /  ALT  14  /  AlkPhos  86  04-25

## 2024-04-26 ENCOUNTER — RESULT REVIEW (OUTPATIENT)
Age: 59
End: 2024-04-26

## 2024-04-26 LAB
A1C WITH ESTIMATED AVERAGE GLUCOSE RESULT: 5.6 % — SIGNIFICANT CHANGE UP (ref 4–5.6)
ANION GAP SERPL CALC-SCNC: 14 MMOL/L — SIGNIFICANT CHANGE UP (ref 5–17)
BUN SERPL-MCNC: 17.7 MG/DL — SIGNIFICANT CHANGE UP (ref 8–20)
CALCIUM SERPL-MCNC: 8.6 MG/DL — SIGNIFICANT CHANGE UP (ref 8.4–10.5)
CHLORIDE SERPL-SCNC: 104 MMOL/L — SIGNIFICANT CHANGE UP (ref 96–108)
CO2 SERPL-SCNC: 23 MMOL/L — SIGNIFICANT CHANGE UP (ref 22–29)
CREAT SERPL-MCNC: 0.88 MG/DL — SIGNIFICANT CHANGE UP (ref 0.5–1.3)
EGFR: 101 ML/MIN/1.73M2 — SIGNIFICANT CHANGE UP
ESTIMATED AVERAGE GLUCOSE: 114 MG/DL — SIGNIFICANT CHANGE UP (ref 68–114)
GLUCOSE BLDC GLUCOMTR-MCNC: 105 MG/DL — HIGH (ref 70–99)
GLUCOSE BLDC GLUCOMTR-MCNC: 135 MG/DL — HIGH (ref 70–99)
GLUCOSE BLDC GLUCOMTR-MCNC: 142 MG/DL — HIGH (ref 70–99)
GLUCOSE SERPL-MCNC: 110 MG/DL — HIGH (ref 70–99)
HCT VFR BLD CALC: 41.7 % — SIGNIFICANT CHANGE UP (ref 39–50)
HGB BLD-MCNC: 14.2 G/DL — SIGNIFICANT CHANGE UP (ref 13–17)
MAGNESIUM SERPL-MCNC: 2.1 MG/DL — SIGNIFICANT CHANGE UP (ref 1.6–2.6)
MCHC RBC-ENTMCNC: 30.1 PG — SIGNIFICANT CHANGE UP (ref 27–34)
MCHC RBC-ENTMCNC: 34.1 GM/DL — SIGNIFICANT CHANGE UP (ref 32–36)
MCV RBC AUTO: 88.3 FL — SIGNIFICANT CHANGE UP (ref 80–100)
PHOSPHATE SERPL-MCNC: 3.6 MG/DL — SIGNIFICANT CHANGE UP (ref 2.4–4.7)
PLATELET # BLD AUTO: 186 K/UL — SIGNIFICANT CHANGE UP (ref 150–400)
POTASSIUM SERPL-MCNC: 3.8 MMOL/L — SIGNIFICANT CHANGE UP (ref 3.5–5.3)
POTASSIUM SERPL-SCNC: 3.8 MMOL/L — SIGNIFICANT CHANGE UP (ref 3.5–5.3)
RBC # BLD: 4.72 M/UL — SIGNIFICANT CHANGE UP (ref 4.2–5.8)
RBC # FLD: 14.1 % — SIGNIFICANT CHANGE UP (ref 10.3–14.5)
SODIUM SERPL-SCNC: 141 MMOL/L — SIGNIFICANT CHANGE UP (ref 135–145)
WBC # BLD: 9.86 K/UL — SIGNIFICANT CHANGE UP (ref 3.8–10.5)
WBC # FLD AUTO: 9.86 K/UL — SIGNIFICANT CHANGE UP (ref 3.8–10.5)

## 2024-04-26 PROCEDURE — 99233 SBSQ HOSP IP/OBS HIGH 50: CPT

## 2024-04-26 PROCEDURE — 99232 SBSQ HOSP IP/OBS MODERATE 35: CPT

## 2024-04-26 PROCEDURE — 70450 CT HEAD/BRAIN W/O DYE: CPT | Mod: 26

## 2024-04-26 PROCEDURE — 93306 TTE W/DOPPLER COMPLETE: CPT | Mod: 26

## 2024-04-26 RX ORDER — POTASSIUM CHLORIDE 20 MEQ
10 PACKET (EA) ORAL
Refills: 0 | Status: DISCONTINUED | OUTPATIENT
Start: 2024-04-26 | End: 2024-04-26

## 2024-04-26 RX ORDER — SENNA PLUS 8.6 MG/1
2 TABLET ORAL AT BEDTIME
Refills: 0 | Status: DISCONTINUED | OUTPATIENT
Start: 2024-04-26 | End: 2024-05-04

## 2024-04-26 RX ORDER — AMLODIPINE BESYLATE 2.5 MG/1
5 TABLET ORAL DAILY
Refills: 0 | Status: DISCONTINUED | OUTPATIENT
Start: 2024-04-26 | End: 2024-04-28

## 2024-04-26 RX ORDER — POLYETHYLENE GLYCOL 3350 17 G/17G
17 POWDER, FOR SOLUTION ORAL DAILY
Refills: 0 | Status: DISCONTINUED | OUTPATIENT
Start: 2024-04-26 | End: 2024-05-04

## 2024-04-26 RX ORDER — ENOXAPARIN SODIUM 100 MG/ML
40 INJECTION SUBCUTANEOUS
Refills: 0 | Status: DISCONTINUED | OUTPATIENT
Start: 2024-04-26 | End: 2024-05-04

## 2024-04-26 RX ORDER — POTASSIUM CHLORIDE 20 MEQ
20 PACKET (EA) ORAL ONCE
Refills: 0 | Status: COMPLETED | OUTPATIENT
Start: 2024-04-26 | End: 2024-04-26

## 2024-04-26 RX ADMIN — ENOXAPARIN SODIUM 40 MILLIGRAM(S): 100 INJECTION SUBCUTANEOUS at 21:03

## 2024-04-26 RX ADMIN — SENNA PLUS 2 TABLET(S): 8.6 TABLET ORAL at 21:02

## 2024-04-26 RX ADMIN — Medication 20 MILLIEQUIVALENT(S): at 04:23

## 2024-04-26 RX ADMIN — AMLODIPINE BESYLATE 5 MILLIGRAM(S): 2.5 TABLET ORAL at 09:38

## 2024-04-26 RX ADMIN — CHLORHEXIDINE GLUCONATE 1 APPLICATION(S): 213 SOLUTION TOPICAL at 05:55

## 2024-04-26 NOTE — PROGRESS NOTE ADULT - ASSESSMENT
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  Per notes, was headed into work and his co-workers noted he suddenly stopped working and had a facial droop.  Initial /137. CTH showed LEFT thalamic IPH. Patient started on Cardene drip and sent to Neuro ICU.  CTA head and neck neg except for stenosis of the R P3 segment, but no vascular malformation or aneurysms.     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  Per notes, was headed into work and his co-workers noted he suddenly stopped working and had a facial droop.  Initial /137. CTH showed LEFT thalamic IPH. Patient started on Cardene drip and sent to Neuro ICU.  CTA head and neck neg except for stenosis of the R P3 segment, but no vascular malformation or aneurysms.     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  Per notes, was headed into work and his co-workers noted he suddenly stopped working and had a facial droop.  Initial /137. CTH showed LEFT thalamic IPH. Patient started on Cardene drip and sent to Neuro ICU.  CTA head and neck neg except for stenosis of the R P3 segment, but no vascular malformation or aneurysms.     Suspected etiology of IPH likely HTN angiopathy given in HTN emergency upon admission    PLAN:   IPH, R hemiparesis, Chronic R eye blindness, Dysarthria  - close monitoring and frequent neurochecks for signs of neurologic deterioration-  - STAT head CT for any neurologic change   - head of bed at 30 degrees  - CT scan 04/26 - stable Left thalamocapsular hemorrhage   - check MRI of the brain w/wo  - blood pressure control with goal blood pressure under 160--started on Norvasc 5mg 4/26  - hold all antiplatelets/anticoagulants   - maintain normovolemia, normoglycemia, normonatremia, and normothermia   - On Lovenox for DVT prophylaxis  - No need for seizure prophylaxis given subcortical location.   - consult PT/OT/SLP--start Dispo planning

## 2024-04-26 NOTE — PHYSICAL THERAPY INITIAL EVALUATION ADULT - GAIT DEVIATIONS NOTED, PT EVAL
right side lean/decreased isabella/decreased step length/decreased stride length/decreased weight-shifting ability

## 2024-04-26 NOTE — PROGRESS NOTE ADULT - SUBJECTIVE AND OBJECTIVE BOX
INCOMPLETE NOTE ***************  Preliminary note, official recommendations pending attending review/signature   Seen and examined by Stroke team attending/team, assessment/ plan as discussed with stroke team attending/team as noted.     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  Per notes, was headed into work and his co-workers noted he suddenly stopped working and had a facial droop.  Initial /137. CTH showed LEFT thalamic IPH. Patient started on Cardene drip and sent to Neuro ICU.        SUBJECTIVE: No events overnight.  No new neurologic complaints.  ROS reported negative unless otherwise noted.    chlorhexidine 2% Cloths 1 Application(s) Topical daily  dextrose 10% Bolus 125 milliLiter(s) IV Bolus once  dextrose 5%. 1000 milliLiter(s) IV Continuous <Continuous>  dextrose 5%. 1000 milliLiter(s) IV Continuous <Continuous>  dextrose 50% Injectable 25 Gram(s) IV Push once  dextrose 50% Injectable 12.5 Gram(s) IV Push once  dextrose Oral Gel 15 Gram(s) Oral once PRN  glucagon  Injectable 1 milliGRAM(s) IntraMuscular once  hydrALAZINE Injectable 10 milliGRAM(s) IV Push every 2 hours PRN  insulin lispro (ADMELOG) corrective regimen sliding scale   SubCutaneous three times a day before meals  labetalol Injectable 10 milliGRAM(s) IV Push every 2 hours PRN  sodium chloride 0.9%. 1000 milliLiter(s) IV Continuous <Continuous>      PHYSICAL EXAM:   Vital Signs Last 24 Hrs  T(C): 36.8 (26 Apr 2024 07:26), Max: 37 (25 Apr 2024 19:00)  T(F): 98.3 (26 Apr 2024 07:26), Max: 98.6 (25 Apr 2024 19:00)  HR: 79 (26 Apr 2024 07:00) (63 - 85)  BP: 146/105 (26 Apr 2024 07:00) (131/86 - 160/91)  BP(mean): 115 (26 Apr 2024 07:00) (94 - 119)  RR: 27 (26 Apr 2024 07:00) (16 - 27)  SpO2: 98% (26 Apr 2024 07:00) (96% - 100%)    Parameters below as of 26 Apr 2024 04:00  Patient On (Oxygen Delivery Method): nasal cannula  O2 Flow (L/min): 3    EXAM PENDING     General: No acute distress.   HEENT: Head normocephalic, atraumatic. Conjunctivae clear w/o exudates or hemorrhage. Sclera non-icteric. Nares are patent bilaterally. Mucous membranes pink and moist.  No tonsillar swelling or exudates.    Neck: Supple, no adenopathy. Trachea midline. No JVD.  Cardiac: Normal rate and rhythm. S1, S2 auscultated. No murmurs, gallops, or rubs.     Respiratory: Lung sounds clear in all fields. Chest wall symmetric, nontender.   Abdominal: Soft, nondistended, nontender. Bowel sounds normoactive x 4 quadrants. No masses, hepatomegaly, or splenomegaly.   Skin: Skin is warm, dry and intact without rashes or lesions. Appropriate color for ethnicity. Nailbeds pink with no cyanosis or clubbing.   Extremities: No edema, 2+ peripheral pulses in b/l upper and b/l lower extremities. Full range of motion in all joints.     Detailed Neurologic Exam:    Mental status: The patient is drowsy but awakens. Speech is severely dysarthric but he follows commands, can tell me his age and year and month.     Cranial nerves: Pupils equal and react symmetrically to light. R eye blind. Extraocular motion is full with no nystagmus. There is no ptosis. R facial droop, Palate elevates symmetrically. Tongue is midline.    Motor: There is normal bulk and tone.  There is no tremor.  Strength is 2/5 in RUE, 3/5 in RLE  Strength is 5/5 in the left arm and leg.    Sensation: Intact to light touch and pin in 4 extremities    Reflexes: 1-2+ throughout and plantar responses are flexor.    Cerebellar: Cannot test on right    Gait : deferred      LABS:                        14.2   9.86  )-----------( 186      ( 26 Apr 2024 02:15 )             41.7    04-26    141  |  104  |  17.7  ----------------------------<  110<H>  3.8   |  23.0  |  0.88    Ca    8.6      26 Apr 2024 02:15  Phos  3.6     04-26  Mg     2.1     04-26    TPro  6.7  /  Alb  3.9  /  TBili  0.3<L>  /  DBili  x   /  AST  22  /  ALT  14  /  AlkPhos  86  04-25  PT/INR - ( 25 Apr 2024 04:10 )   PT: 11.4 sec;   INR: 1.03 ratio         PTT - ( 25 Apr 2024 04:10 )  PTT:29.4 sec      04-25 Chol 156 LDL -- HDL 48 Trig 59    A1C:    IMAGING: Reviewed by me.     < from: CT Head No Cont (04.25.24 @ 10:31) >  1.  Grossly stable left thalamic hemorrhage.  2.  Midline shift to the right of approximately 0.4 cm.  3.  Chronic ischemic changes.  4.  Redemonstrated partially calcified masslike density in the right   nasal passages/ethmoid air cells, grossly stable.    < end of copied text >  < from: CT Angio Neck Stroke Protocol w/ IV Cont (04.25.24 @ 04:26) >  CTA NECK:  No significant stenosis of the cervical carotid arteries based   on NASCET criteria. Patent cervical vertebral arteries. No evidence of   cervical carotid or vertebral artery dissection.    Polypoid cystic and partially calcified mass in the right nasal passage   with protrudes into the knee nasopharynx and involves the right ethmoid   air cells. Near complete opacification of the right frontal sinus, right   ethmoid air cells, and right sphenoid sinus. Direct endoscopic   visualization is recommended. Correlation with outside prior imaging   would be helpful.    Mild pulmonary interstitial lung edema and small bilateral pleural   effusions.    CTA HEAD:  No high-grade stenosis or occlusion of the major proximal   arterial branches, however, short segment severe stenosis/occlusion of   the right posterior cerebral artery proximal P3 branch. No evidence of an   intracranial arterial aneurysm or arteriovenous malformation on CTA,   specifically, no evidence of vascular malformation in the region of the   left thalamic hemorrhage. No evidence of active extravasation of contrast   within the left thalamic parenchymal hemorrhage.    < end of copied text >     Preliminary note, official recommendations pending attending review/signature   Seen and examined by Stroke team attending/team, assessment/ plan as discussed with stroke team attending/team as noted.     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  Per notes, was headed into work and his co-workers noted he suddenly stopped working and had a facial droop.  Initial /137. CTH showed LEFT thalamic IPH. Patient started on Cardene drip and sent to Neuro ICU.        SUBJECTIVE: No events overnight.  No new neurologic complaints.  ROS reported negative unless otherwise noted.    chlorhexidine 2% Cloths 1 Application(s) Topical daily  dextrose 10% Bolus 125 milliLiter(s) IV Bolus once  dextrose 5%. 1000 milliLiter(s) IV Continuous <Continuous>  dextrose 5%. 1000 milliLiter(s) IV Continuous <Continuous>  dextrose 50% Injectable 25 Gram(s) IV Push once  dextrose 50% Injectable 12.5 Gram(s) IV Push once  dextrose Oral Gel 15 Gram(s) Oral once PRN  glucagon  Injectable 1 milliGRAM(s) IntraMuscular once  hydrALAZINE Injectable 10 milliGRAM(s) IV Push every 2 hours PRN  insulin lispro (ADMELOG) corrective regimen sliding scale   SubCutaneous three times a day before meals  labetalol Injectable 10 milliGRAM(s) IV Push every 2 hours PRN  sodium chloride 0.9%. 1000 milliLiter(s) IV Continuous <Continuous>      PHYSICAL EXAM:   Vital Signs Last 24 Hrs  T(C): 36.8 (26 Apr 2024 07:26), Max: 37 (25 Apr 2024 19:00)  T(F): 98.3 (26 Apr 2024 07:26), Max: 98.6 (25 Apr 2024 19:00)  HR: 79 (26 Apr 2024 07:00) (63 - 85)  BP: 146/105 (26 Apr 2024 07:00) (131/86 - 160/91)  BP(mean): 115 (26 Apr 2024 07:00) (94 - 119)  RR: 27 (26 Apr 2024 07:00) (16 - 27)  SpO2: 98% (26 Apr 2024 07:00) (96% - 100%)    Parameters below as of 26 Apr 2024 04:00  Patient On (Oxygen Delivery Method): nasal cannula  O2 Flow (L/min): 3    EXAM PENDING     General: No acute distress.   HEENT: Head normocephalic, atraumatic. Conjunctivae clear w/o exudates or hemorrhage. Sclera non-icteric. Nares are patent bilaterally. Mucous membranes pink and moist.  No tonsillar swelling or exudates.    Neck: Supple, no adenopathy. Trachea midline. No JVD.  Cardiac: Normal rate and rhythm. S1, S2 auscultated. No murmurs, gallops, or rubs.     Respiratory: Lung sounds clear in all fields. Chest wall symmetric, nontender.   Abdominal: Soft, nondistended, nontender. Bowel sounds normoactive x 4 quadrants. No masses, hepatomegaly, or splenomegaly.   Skin: Skin is warm, dry and intact without rashes or lesions. Appropriate color for ethnicity. Nailbeds pink with no cyanosis or clubbing.   Extremities: No edema, 2+ peripheral pulses in b/l upper and b/l lower extremities. Full range of motion in all joints.     Detailed Neurologic Exam:    Mental status: The patient is drowsy but awakens. Speech is severely dysarthric but he follows commands, can tell me his age and year and month.     Cranial nerves: Pupils equal and react symmetrically to light. R eye blind. Extraocular motion is full with no nystagmus. There is no ptosis. R facial droop, Palate elevates symmetrically. Tongue is midline.    Motor: There is normal bulk and tone.  There is no tremor.  Strength is +3/5 in R shoulder, -4/5 elbow extension, +4/5 elbow flexion, 4 in , 5/5 in RLE  Strength is 5/5 in the left arm and leg.    Sensation: Intact to light touch and pin in 4 extremities    Reflexes: deferred.    Cerebellar: Cannot test on right    Gait : deferred      LABS:                        14.2   9.86  )-----------( 186      ( 26 Apr 2024 02:15 )             41.7    04-26    141  |  104  |  17.7  ----------------------------<  110<H>  3.8   |  23.0  |  0.88    Ca    8.6      26 Apr 2024 02:15  Phos  3.6     04-26  Mg     2.1     04-26    TPro  6.7  /  Alb  3.9  /  TBili  0.3<L>  /  DBili  x   /  AST  22  /  ALT  14  /  AlkPhos  86  04-25  PT/INR - ( 25 Apr 2024 04:10 )   PT: 11.4 sec;   INR: 1.03 ratio         PTT - ( 25 Apr 2024 04:10 )  PTT:29.4 sec      04-25 Chol 156 LDL -- HDL 48 Trig 59    A1C:    IMAGING: Reviewed by me.     CT Head No Cont (04.26.24 @ 06:16)   IMPRESSION:  Left thalamocapsular hemorrhage is stable        < from: CT Head No Cont (04.25.24 @ 10:31)   1.  Grossly stable left thalamic hemorrhage.  2.  Midline shift to the right of approximately 0.4 cm.  3.  Chronic ischemic changes.  4.  Redemonstrated partially calcified masslike density in the right   nasal passages/ethmoid air cells, grossly stable.      CT Angio Neck Stroke Protocol w/ IV Cont (04.25.24 @ 04:26)   CTA NECK:  No significant stenosis of the cervical carotid arteries based   on NASCET criteria. Patent cervical vertebral arteries. No evidence of   cervical carotid or vertebral artery dissection.    Polypoid cystic and partially calcified mass in the right nasal passage   with protrudes into the knee nasopharynx and involves the right ethmoid   air cells. Near complete opacification of the right frontal sinus, right   ethmoid air cells, and right sphenoid sinus. Direct endoscopic   visualization is recommended. Correlation with outside prior imaging   would be helpful.    Mild pulmonary interstitial lung edema and small bilateral pleural   effusions.    CTA HEAD:  No high-grade stenosis or occlusion of the major proximal   arterial branches, however, short segment severe stenosis/occlusion of   the right posterior cerebral artery proximal P3 branch. No evidence of an   intracranial arterial aneurysm or arteriovenous malformation on CTA,   specifically, no evidence of vascular malformation in the region of the   left thalamic hemorrhage. No evidence of active extravasation of contrast   within the left thalamic parenchymal hemorrhage.         Preliminary note, official recommendations pending attending review/signature   Seen and examined by Stroke team attending/team, assessment/ plan as discussed with stroke team attending/team as noted.     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  Per notes, was headed into work and his co-workers noted he suddenly stopped working and had a facial droop.  Initial /137. CTH showed LEFT thalamic IPH. Patient started on Cardene drip and sent to Neuro ICU.        SUBJECTIVE: No events overnight.  No new neurologic complaints.  ROS reported negative unless otherwise noted.    chlorhexidine 2% Cloths 1 Application(s) Topical daily  dextrose 10% Bolus 125 milliLiter(s) IV Bolus once  dextrose 5%. 1000 milliLiter(s) IV Continuous <Continuous>  dextrose 5%. 1000 milliLiter(s) IV Continuous <Continuous>  dextrose 50% Injectable 25 Gram(s) IV Push once  dextrose 50% Injectable 12.5 Gram(s) IV Push once  dextrose Oral Gel 15 Gram(s) Oral once PRN  glucagon  Injectable 1 milliGRAM(s) IntraMuscular once  hydrALAZINE Injectable 10 milliGRAM(s) IV Push every 2 hours PRN  insulin lispro (ADMELOG) corrective regimen sliding scale   SubCutaneous three times a day before meals  labetalol Injectable 10 milliGRAM(s) IV Push every 2 hours PRN  sodium chloride 0.9%. 1000 milliLiter(s) IV Continuous <Continuous>      PHYSICAL EXAM:   Vital Signs Last 24 Hrs  T(C): 36.8 (26 Apr 2024 07:26), Max: 37 (25 Apr 2024 19:00)  T(F): 98.3 (26 Apr 2024 07:26), Max: 98.6 (25 Apr 2024 19:00)  HR: 79 (26 Apr 2024 07:00) (63 - 85)  BP: 146/105 (26 Apr 2024 07:00) (131/86 - 160/91)  BP(mean): 115 (26 Apr 2024 07:00) (94 - 119)  RR: 27 (26 Apr 2024 07:00) (16 - 27)  SpO2: 98% (26 Apr 2024 07:00) (96% - 100%)    Parameters below as of 26 Apr 2024 04:00  Patient On (Oxygen Delivery Method): nasal cannula  O2 Flow (L/min): 3        General: No acute distress.    Detailed Neurologic Exam:    Mental status: The patient is drowsy but awakens. Speech is severely dysarthric but he follows commands, can tell me his age and year and month.     Cranial nerves: Pupils equal and react symmetrically to light. R eye blind. Extraocular motion is full with no nystagmus. There is no ptosis. R facial droop, Palate elevates symmetrically. Tongue is midline.    Motor: There is normal bulk and tone.  There is no tremor.  Strength is +3/5 in R shoulder, -4/5 elbow extension, +4/5 elbow flexion, 4 in , 5/5 in RLE  Strength is 5/5 in the left arm and leg.    Sensation: Intact to light touch and pin in 4 extremities    Reflexes: deferred.    Cerebellar: Cannot test on right    Gait : deferred      LABS:                        14.2   9.86  )-----------( 186      ( 26 Apr 2024 02:15 )             41.7    04-26    141  |  104  |  17.7  ----------------------------<  110<H>  3.8   |  23.0  |  0.88    Ca    8.6      26 Apr 2024 02:15  Phos  3.6     04-26  Mg     2.1     04-26    TPro  6.7  /  Alb  3.9  /  TBili  0.3<L>  /  DBili  x   /  AST  22  /  ALT  14  /  AlkPhos  86  04-25  PT/INR - ( 25 Apr 2024 04:10 )   PT: 11.4 sec;   INR: 1.03 ratio         PTT - ( 25 Apr 2024 04:10 )  PTT:29.4 sec      04-25 Chol 156 LDL -- HDL 48 Trig 59    A1C: 5.8    IMAGING: Reviewed by me.     CT Head No Cont (04.26.24 @ 06:16)   IMPRESSION:  Left thalamocapsular hemorrhage is stable        < from: CT Head No Cont (04.25.24 @ 10:31)   1.  Grossly stable left thalamic hemorrhage.  2.  Midline shift to the right of approximately 0.4 cm.  3.  Chronic ischemic changes.  4.  Redemonstrated partially calcified masslike density in the right   nasal passages/ethmoid air cells, grossly stable.      CT Angio Neck Stroke Protocol w/ IV Cont (04.25.24 @ 04:26)   CTA NECK:  No significant stenosis of the cervical carotid arteries based   on NASCET criteria. Patent cervical vertebral arteries. No evidence of   cervical carotid or vertebral artery dissection.    Polypoid cystic and partially calcified mass in the right nasal passage   with protrudes into the knee nasopharynx and involves the right ethmoid   air cells. Near complete opacification of the right frontal sinus, right   ethmoid air cells, and right sphenoid sinus. Direct endoscopic   visualization is recommended. Correlation with outside prior imaging   would be helpful.    Mild pulmonary interstitial lung edema and small bilateral pleural   effusions.    CTA HEAD:  No high-grade stenosis or occlusion of the major proximal   arterial branches, however, short segment severe stenosis/occlusion of   the right posterior cerebral artery proximal P3 branch. No evidence of an   intracranial arterial aneurysm or arteriovenous malformation on CTA,   specifically, no evidence of vascular malformation in the region of the   left thalamic hemorrhage. No evidence of active extravasation of contrast   within the left thalamic parenchymal hemorrhage.          Seen and examined by Stroke team attending/team, assessment/ plan as discussed with stroke team attending/team as noted.     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  Per notes, was headed into work and his co-workers noted he suddenly stopped working and had a facial droop.  Initial /137. CTH showed LEFT thalamic IPH. Patient started on Cardene drip and sent to Neuro ICU.        SUBJECTIVE: No events overnight.  No new neurologic complaints.  ROS reported negative unless otherwise noted.    chlorhexidine 2% Cloths 1 Application(s) Topical daily  dextrose 10% Bolus 125 milliLiter(s) IV Bolus once  dextrose 5%. 1000 milliLiter(s) IV Continuous <Continuous>  dextrose 5%. 1000 milliLiter(s) IV Continuous <Continuous>  dextrose 50% Injectable 25 Gram(s) IV Push once  dextrose 50% Injectable 12.5 Gram(s) IV Push once  dextrose Oral Gel 15 Gram(s) Oral once PRN  glucagon  Injectable 1 milliGRAM(s) IntraMuscular once  hydrALAZINE Injectable 10 milliGRAM(s) IV Push every 2 hours PRN  insulin lispro (ADMELOG) corrective regimen sliding scale   SubCutaneous three times a day before meals  labetalol Injectable 10 milliGRAM(s) IV Push every 2 hours PRN  sodium chloride 0.9%. 1000 milliLiter(s) IV Continuous <Continuous>      PHYSICAL EXAM:   Vital Signs Last 24 Hrs  T(C): 36.8 (26 Apr 2024 07:26), Max: 37 (25 Apr 2024 19:00)  T(F): 98.3 (26 Apr 2024 07:26), Max: 98.6 (25 Apr 2024 19:00)  HR: 79 (26 Apr 2024 07:00) (63 - 85)  BP: 146/105 (26 Apr 2024 07:00) (131/86 - 160/91)  BP(mean): 115 (26 Apr 2024 07:00) (94 - 119)  RR: 27 (26 Apr 2024 07:00) (16 - 27)  SpO2: 98% (26 Apr 2024 07:00) (96% - 100%)    Parameters below as of 26 Apr 2024 04:00  Patient On (Oxygen Delivery Method): nasal cannula  O2 Flow (L/min): 3        General: No acute distress.    Detailed Neurologic Exam:    Mental status: The patient is drowsy but awakens. Speech is severely dysarthric but he follows commands, can tell me his age and year and month.     Cranial nerves: Pupils equal and react symmetrically to light. R eye blind. Extraocular motion is full with no nystagmus. There is no ptosis. R facial droop, Palate elevates symmetrically. Tongue is midline.    Motor: There is normal bulk and tone.  There is no tremor.  Strength is +3/5 in R shoulder, -4/5 elbow extension, +4/5 elbow flexion, 4 in , 5/5 in RLE  Strength is 5/5 in the left arm and leg.    Sensation: Intact to light touch and pin in 4 extremities    Reflexes: deferred.    Cerebellar: Cannot test on right    Gait : deferred      LABS:                        14.2   9.86  )-----------( 186      ( 26 Apr 2024 02:15 )             41.7    04-26    141  |  104  |  17.7  ----------------------------<  110<H>  3.8   |  23.0  |  0.88    Ca    8.6      26 Apr 2024 02:15  Phos  3.6     04-26  Mg     2.1     04-26    TPro  6.7  /  Alb  3.9  /  TBili  0.3<L>  /  DBili  x   /  AST  22  /  ALT  14  /  AlkPhos  86  04-25  PT/INR - ( 25 Apr 2024 04:10 )   PT: 11.4 sec;   INR: 1.03 ratio         PTT - ( 25 Apr 2024 04:10 )  PTT:29.4 sec      04-25 Chol 156 LDL -- HDL 48 Trig 59    A1C: 5.8    IMAGING: Reviewed by me.     CT Head No Cont (04.26.24 @ 06:16)   IMPRESSION:  Left thalamocapsular hemorrhage is stable        < from: CT Head No Cont (04.25.24 @ 10:31)   1.  Grossly stable left thalamic hemorrhage.  2.  Midline shift to the right of approximately 0.4 cm.  3.  Chronic ischemic changes.  4.  Redemonstrated partially calcified masslike density in the right   nasal passages/ethmoid air cells, grossly stable.      CT Angio Neck Stroke Protocol w/ IV Cont (04.25.24 @ 04:26)   CTA NECK:  No significant stenosis of the cervical carotid arteries based   on NASCET criteria. Patent cervical vertebral arteries. No evidence of   cervical carotid or vertebral artery dissection.    Polypoid cystic and partially calcified mass in the right nasal passage   with protrudes into the knee nasopharynx and involves the right ethmoid   air cells. Near complete opacification of the right frontal sinus, right   ethmoid air cells, and right sphenoid sinus. Direct endoscopic   visualization is recommended. Correlation with outside prior imaging   would be helpful.    Mild pulmonary interstitial lung edema and small bilateral pleural   effusions.    CTA HEAD:  No high-grade stenosis or occlusion of the major proximal   arterial branches, however, short segment severe stenosis/occlusion of   the right posterior cerebral artery proximal P3 branch. No evidence of an   intracranial arterial aneurysm or arteriovenous malformation on CTA,   specifically, no evidence of vascular malformation in the region of the   left thalamic hemorrhage. No evidence of active extravasation of contrast   within the left thalamic parenchymal hemorrhage.

## 2024-04-26 NOTE — PATIENT PROFILE ADULT - FALL HARM RISK - HARM RISK INTERVENTIONS

## 2024-04-26 NOTE — PROGRESS NOTE ADULT - SUBJECTIVE AND OBJECTIVE BOX
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 Around 03:15 AM, his coworkers noted his acute weakness and called EMS. CTH obtained found with acute left thalamic IPH with mild regional mass effect and surrounding edema. Admission mRS 0, admission ICH 2, admission NIH 17  4/25: Admitted to NSICU. CTA head/neck with severe stenosis/occlusion of right PCA proxmial P3 branch, no evidence of aneurysm or AVM. Cardene gtt started. Stroke consulted. SLP evaluated- recommended pureed diet with NO liquids. Weaned off cardene. Repeat CT head stable  4/26: Repeat CT head stable. Stable for downgrade to telemetry under stroke neurology    Vital Signs Last 24 Hrs  T(C): 36.8 (26 Apr 2024 07:26), Max: 37 (25 Apr 2024 19:00)  T(F): 98.3 (26 Apr 2024 07:26), Max: 98.6 (25 Apr 2024 19:00)  HR: 86 (26 Apr 2024 08:00) (64 - 86)  BP: 160/92 (26 Apr 2024 08:00) (134/89 - 160/92)  BP(mean): 110 (26 Apr 2024 08:00) (94 - 119)  RR: 20 (26 Apr 2024 08:00) (16 - 27)  SpO2: 97% (26 Apr 2024 08:00) (97% - 100%)    Parameters below as of 26 Apr 2024 08:00  Patient On (Oxygen Delivery Method): nasal cannula  O2 Flow (L/min): 3    PHYSICAL EXAM:  NOTE INCOMPLETE  GENERAL: NAD, well-groomed  HEAD:  Atraumatic, normocephalic  DRAINS:   WOUND: Dressing clean dry intact  VANCE COMA SCORE: E- V- M- =       E: 4= opens eyes spontaneously 3= to voice 2= to noxious 1= no opening       V: 5= oriented 4= confused 3= inappropriate words 2= incomprehensible sounds 1= nonverbal 1T= intubated       M: 6= follows commands 5= localizes 4= withdraws 3= flexor posturing 2= extensor posturing 1= no movement  MENTAL STATUS: AAO x3; Awake/Comatose; Opens eyes spontaneously/to voice/to light touch/to noxious stimuli; Appropriately conversant without aphasia/Nonverbal; following simple commands/mimicking/not following commands  CRANIAL NERVES: Visual acuity normal for age, visual fields full to confrontation, PERRL. EOMI without nystagmus. Facial sensation intact V1-3 distribution b/l. Face symmetric w/ normal eye closure and smile, tongue midline. Hearing grossly intact. Speech clear. Head turning and shoulder shrug intact.   REFLEXES: PERRL. Corneals intact b/l. Gag intact. Cough intact. Oculocephalic reflex intact (Doll's eye). Negative Mckeon's b/l. Negative clonus b/l  MOTOR: strength 5/5 b/l upper and lower extremities  Uppers     Delt (C5/6)     Bicep (C5/6)     Wrist Extend (C6)     Tricep (C7)     HG (C8/T1)  R                     5/5                 5/5                         5/5                           5/5                   5/5  L                      5/5                 5/5                         5/5                           5/5                   5/5  Lowers      HF(L1/L2)     KE (L3)     DF (L4)     EHL (L5)     PF (S1)      R                     5/5              5/5           5/5           5/5            5/5  L                     5/5               5/5          5/5            5/5            5/5  SENSATION: grossly intact to light touch all extremities  COORDINATION: Gait intact; rapid alternating movements intact; heel to shin intact; no upper extremity dysmetria  CHEST/LUNG: Clear to auscultation bilaterally; no rales, rhonchi, wheezing, or rubs  HEART: +S1/+S2; Regular rate and rhythm; no murmurs, rubs, or gallops  ABDOMEN: Soft, nontender, nondistended; bowel sounds present all four quadrants  EXTREMITIES:  2+ peripheral pulses, no clubbing, cyanosis, or edema  SKIN: Warm, dry; no rashes or lesions    LABS:                        14.2   9.86  )-----------( 186      ( 26 Apr 2024 02:15 )             41.7     04-26    141  |  104  |  17.7  ----------------------------<  110<H>  3.8   |  23.0  |  0.88    Ca    8.6      26 Apr 2024 02:15  Phos  3.6     04-26  Mg     2.1     04-26    TPro  6.7  /  Alb  3.9  /  TBili  0.3<L>  /  DBili  x   /  AST  22  /  ALT  14  /  AlkPhos  86  04-25    PT/INR - ( 25 Apr 2024 04:10 )   PT: 11.4 sec;   INR: 1.03 ratio      PTT - ( 25 Apr 2024 04:10 )  PTT:29.4 sec  Urinalysis Basic - ( 26 Apr 2024 02:15 )    Color: x / Appearance: x / SG: x / pH: x  Gluc: 110 mg/dL / Ketone: x  / Bili: x / Urobili: x   Blood: x / Protein: x / Nitrite: x   Leuk Esterase: x / RBC: x / WBC x   Sq Epi: x / Non Sq Epi: x / Bacteria: x    04-25 @ 07:01  -  04-26 @ 07:00  --------------------------------------------------------  IN: 1440 mL / OUT: 2730 mL / NET: -1290 mL    RADIOLOGY & ADDITIONAL TESTS:  CT Head No Cont (04.26.24 @ 06:16)  IMPRESSION:  Left thalamocapsular hemorrhage is stable    CT Head No Cont (04.25.24 @ 10:31)  IMPRESSION:  1.  Grossly stable left thalamic hemorrhage.  2.  Midline shift to the right of approximately 0.4 cm.  3.  Chronic ischemic changes.  4.  Redemonstrated partially calcified masslike density in the right   nasal passages/ethmoid air cells, grossly stable.    CT Angio Neck Stroke Protocol w/ IV Cont (04.25.24 @ 04:26)  IMPRESSION:  CTA NECK:  No significant stenosis of the cervical carotid arteries based   on NASCET criteria. Patent cervical vertebral arteries. No evidence of   cervical carotid or vertebral artery dissection.    Polypoid cystic and partially calcified mass in the right nasal passage   with protrudes into the knee nasopharynx and involves the right ethmoid   air cells. Near complete opacification of the right frontal sinus, right   ethmoid air cells, and right sphenoid sinus. Direct endoscopic   visualization is recommended. Correlation with outside prior imaging   would be helpful.    Mild pulmonary interstitial lung edema and small bilateral pleural   effusions.    CTA HEAD:  No high-grade stenosis or occlusion of the major proximal   arterial branches, however, short segment severe stenosis/occlusion of   the right posterior cerebral artery proximal P3 branch. No evidence of an   intracranial arterial aneurysm or arteriovenous malformation on CTA,   specifically, no evidence of vascular malformation in the region of the   left thalamic hemorrhage. No evidence of active extravasation of contrast   within the left thalamic parenchymal hemorrhage.    CT Brain Stroke Protocol (04.25.24 @ 04:17)  IMPRESSION:  Acute left thalamic parenchymal hemorrhage with rim of perihematoma edema   measuring 2.9 x 1.9 x 3.7 cm with mass effect upon the left lateral   ventricle and 5 mm shift of the midline towards the right. Trace   intraventricular hemorrhage in the left occipital horn.  Moderate to severe chronic small vessel ischemic changes in the   frontoparietal white matter, bilateral basal ganglionic lacunar infarcts,   and small chronic appearing infarct in the right cerebellum.  Polypoid cystic and partially calcified masslike density in the right   nasal passage with involvement of the right ethmoid air cells. Near   complete opacification of the right frontal sinus, right ethmoid air   cells, and right sphenoid sinus. 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 Around 03:15 AM, his coworkers noted his acute weakness and called EMS. CTH obtained found with acute left thalamic IPH with mild regional mass effect and surrounding edema. Admission mRS 0, admission ICH 2, admission NIH 17  4/25: Admitted to NSICU. CTA head/neck with severe stenosis/occlusion of right PCA proxmial P3 branch, no evidence of aneurysm or AVM. Cardene gtt started. Stroke consulted. SLP evaluated- recommended pureed diet with NO liquids. Weaned off cardene. Repeat CT head stable  4/26: Repeat CT head stable. Stable for downgrade to telemetry under stroke neurology    Vital Signs Last 24 Hrs  T(C): 36.8 (26 Apr 2024 07:26), Max: 37 (25 Apr 2024 19:00)  T(F): 98.3 (26 Apr 2024 07:26), Max: 98.6 (25 Apr 2024 19:00)  HR: 86 (26 Apr 2024 08:00) (64 - 86)  BP: 160/92 (26 Apr 2024 08:00) (134/89 - 160/92)  BP(mean): 110 (26 Apr 2024 08:00) (94 - 119)  RR: 20 (26 Apr 2024 08:00) (16 - 27)  SpO2: 97% (26 Apr 2024 08:00) (97% - 100%)    Parameters below as of 26 Apr 2024 08:00  Patient On (Oxygen Delivery Method): nasal cannula  O2 Flow (L/min): 3    PHYSICAL EXAM:  GENERAL: NAD, well-groomed  HEAD:  Atraumatic, normocephalic  VANCE COMA SCORE: E- 4 V- 4 M- 6=14  MENTAL STATUS: Awake, alert, oriented to self only. Mostly appropriately answering simple questions in Yoruba; following commands  CRANIAL NERVES: R eye opacified and blind at baseline. L eye pupil 3 mm reactive. EOMI without nystagmus. Facial sensation intact V1-3 distribution b/l. R facial droop; tongue midline. Hearing grossly intact. Speech dysarthric  MOTOR: RUE 4+/5 no drift; RLE 4/5 + drift. LUE/LLE 5/5  SENSATION: grossly intact to light touch all extremities  CHEST/LUNG: Nonlabored breathing  HEART: Regular rate  ABDOMEN: Soft, nontender, nondistended  EXTREMITIES: nontender to palpation    LABS:                        14.2   9.86  )-----------( 186      ( 26 Apr 2024 02:15 )             41.7     04-26    141  |  104  |  17.7  ----------------------------<  110<H>  3.8   |  23.0  |  0.88    Ca    8.6      26 Apr 2024 02:15  Phos  3.6     04-26  Mg     2.1     04-26    TPro  6.7  /  Alb  3.9  /  TBili  0.3<L>  /  DBili  x   /  AST  22  /  ALT  14  /  AlkPhos  86  04-25    PT/INR - ( 25 Apr 2024 04:10 )   PT: 11.4 sec;   INR: 1.03 ratio      PTT - ( 25 Apr 2024 04:10 )  PTT:29.4 sec  Urinalysis Basic - ( 26 Apr 2024 02:15 )    Color: x / Appearance: x / SG: x / pH: x  Gluc: 110 mg/dL / Ketone: x  / Bili: x / Urobili: x   Blood: x / Protein: x / Nitrite: x   Leuk Esterase: x / RBC: x / WBC x   Sq Epi: x / Non Sq Epi: x / Bacteria: x    04-25 @ 07:01  -  04-26 @ 07:00  --------------------------------------------------------  IN: 1440 mL / OUT: 2730 mL / NET: -1290 mL    RADIOLOGY & ADDITIONAL TESTS:  CT Head No Cont (04.26.24 @ 06:16)  IMPRESSION:  Left thalamocapsular hemorrhage is stable    CT Head No Cont (04.25.24 @ 10:31)  IMPRESSION:  1.  Grossly stable left thalamic hemorrhage.  2.  Midline shift to the right of approximately 0.4 cm.  3.  Chronic ischemic changes.  4.  Redemonstrated partially calcified masslike density in the right   nasal passages/ethmoid air cells, grossly stable.    CT Angio Neck Stroke Protocol w/ IV Cont (04.25.24 @ 04:26)  IMPRESSION:  CTA NECK:  No significant stenosis of the cervical carotid arteries based   on NASCET criteria. Patent cervical vertebral arteries. No evidence of   cervical carotid or vertebral artery dissection.    Polypoid cystic and partially calcified mass in the right nasal passage   with protrudes into the knee nasopharynx and involves the right ethmoid   air cells. Near complete opacification of the right frontal sinus, right   ethmoid air cells, and right sphenoid sinus. Direct endoscopic   visualization is recommended. Correlation with outside prior imaging   would be helpful.    Mild pulmonary interstitial lung edema and small bilateral pleural   effusions.    CTA HEAD:  No high-grade stenosis or occlusion of the major proximal   arterial branches, however, short segment severe stenosis/occlusion of   the right posterior cerebral artery proximal P3 branch. No evidence of an   intracranial arterial aneurysm or arteriovenous malformation on CTA,   specifically, no evidence of vascular malformation in the region of the   left thalamic hemorrhage. No evidence of active extravasation of contrast   within the left thalamic parenchymal hemorrhage.    CT Brain Stroke Protocol (04.25.24 @ 04:17)  IMPRESSION:  Acute left thalamic parenchymal hemorrhage with rim of perihematoma edema   measuring 2.9 x 1.9 x 3.7 cm with mass effect upon the left lateral   ventricle and 5 mm shift of the midline towards the right. Trace   intraventricular hemorrhage in the left occipital horn.  Moderate to severe chronic small vessel ischemic changes in the   frontoparietal white matter, bilateral basal ganglionic lacunar infarcts,   and small chronic appearing infarct in the right cerebellum.  Polypoid cystic and partially calcified masslike density in the right   nasal passage with involvement of the right ethmoid air cells. Near   complete opacification of the right frontal sinus, right ethmoid air   cells, and right sphenoid sinus.

## 2024-04-26 NOTE — PHYSICAL THERAPY INITIAL EVALUATION ADULT - ADDITIONAL COMMENTS
Patient with confusion, history questionable. Per patient, lives in an apartment with his cousin. Unknown presence of stairs or if assistance is available. Independent prior to admission, unknown if he owns or uses DME.

## 2024-04-26 NOTE — PROGRESS NOTE ADULT - SUBJECTIVE AND OBJECTIVE BOX
HPI:  57 y/o male unknown pmhx aside from 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 Patient LKW was about 0200 when he was headed into work this am. 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 EMS called. Patient came in as code stroke. Per son patient takes asa daily for his heart and is unsure of any other medications or medical history. Son reports patient had been complaining of HA for about 2-3weeks. In the ED /137. CTH showed LEFT thalamic IPH. NIHSS at time of examination was 17. Patient started on Cardene drip. MRs 0. ICH score 2.     1A: Level of consciousness       +1= Arouses to minor stimulation      1B: Ask month and age        +2= Aphasic    1C: "Blink eyes" and "Squeeze Hands"       +2= Performs 0 tasks    2: Horizontal EOMs       0= Normal       3: Visual fields       0= No visual loss    4: Facial palsy (use grimace if obtunded)        +2= Partial paralysis ( lower face)      5A: Left arm motor drift (count out loud and use fingers to show count)       0= No drift x 10 seconds    5B: Right arm motor drift       +2= Some effort against gravity    6A: Left leg motor drift       0= No drift x 10 seconds    6B: Right leg motor drift       +2= Some effort against gravity         7: Limb ataxia (FNF/heel-shin)       0= Does not understand        8: Sensation       +1= mild-moderate loss: less sharp/more dull    9: Language/aphasia- describe the scene (on mechelle); name the items; read the sentences (on mechelle)       +2= Severe aphasia: fragmentary expression, inference needed, cannot identify materials    10: Dysarthria- read the words       +2= Severe dysarthria: unintelligible slurring or out of proportion to dysphasia        11: Extinction/inattention        +1= Extinction to b/l simultaneous stimulation      NIHHS= 17 (25 Apr 2024 05:12)      INTERVAL HPI/OVERNIGHT EVENTS:  58y Male seen lying comfortably in bed. Tolerating diet. Gallegos in with clear urine. Denies headache, weakness, numbness, n/v/d, fevers, chills, chest pain, SOB.     Vital Signs Last 24 Hrs  T(C): 36.7 (25 Apr 2024 23:38), Max: 37 (25 Apr 2024 19:00)  T(F): 98 (25 Apr 2024 23:38), Max: 98.6 (25 Apr 2024 19:00)  HR: 72 (25 Apr 2024 19:00) (63 - 92)  BP: 135/83 (25 Apr 2024 19:00) (118/72 - 198/137)  BP(mean): 97 (25 Apr 2024 19:00) (97 - 119)  RR: 20 (25 Apr 2024 19:00) (16 - 27)  SpO2: 99% (25 Apr 2024 19:00) (88% - 100%)    Parameters below as of 25 Apr 2024 16:00  Patient On (Oxygen Delivery Method): nasal cannula  O2 Flow (L/min): 3      PHYSICAL EXAM:  GENERAL: NAD, well-groomed  VANCE COMA SCORE: E- V- M- = 15       E: 4= opens eyes spontaneously 3= to voice 2= to noxious 1= no opening       V: 5= oriented 4= confused 3= inappropriate words 2= incomprehensible sounds 1= nonverbal 1T= intubated       M: 6= follows commands 5= localizes 4= withdraws 3= flexor posturing 2= extensor posturing 1= no movement  MENTAL STATUS: AAO x3; Awake; Opens eyes spontaneously; Appropriately conversant with mild-moderate dysarthria and aphasia; following simple commands   CRANIAL NERVES: PERRL. EOMI without nystagmus. Facial sensation intact V1-3 distribution b/l. Face symmetric w/ normal eye closure and smile, tongue midline. Hearing grossly intact. Head turning and shoulder shrug intact.   MOTOR: strength 5/5 b/l upper and lower extremities  SENSATION: grossly intact to light touch all extremities  CHEST/LUNG: Chest rise and fall equally and bilaterally   HEART: +S1/+S2   ABDOMEN: Soft, nontender, nondistended   EXTREMITIES:  2+ peripheral pulses, no clubbing, cyanosis, or edema  SKIN: Warm, dry; no rashes or lesions    LABS:                        14.1   6.74  )-----------( 183      ( 25 Apr 2024 04:10 )             41.8     04-25    140  |  104  |  19.3  ----------------------------<  118<H>  3.8   |  23.0  |  0.86    Ca    8.8      25 Apr 2024 04:10    TPro  6.7  /  Alb  3.9  /  TBili  0.3<L>  /  DBili  x   /  AST  22  /  ALT  14  /  AlkPhos  86  04-25    PT/INR - ( 25 Apr 2024 04:10 )   PT: 11.4 sec;   INR: 1.03 ratio         PTT - ( 25 Apr 2024 04:10 )  PTT:29.4 sec  Urinalysis Basic - ( 25 Apr 2024 04:10 )    Color: x / Appearance: x / SG: x / pH: x  Gluc: 118 mg/dL / Ketone: x  / Bili: x / Urobili: x   Blood: x / Protein: x / Nitrite: x   Leuk Esterase: x / RBC: x / WBC x   Sq Epi: x / Non Sq Epi: x / Bacteria: x        04-24 @ 07:01 - 04-25 @ 07:00  --------------------------------------------------------  IN: 244 mL / OUT: 0 mL / NET: 244 mL    04-25 @ 07:01  - 04-26 @ 00:11  --------------------------------------------------------  IN: 720 mL / OUT: 1730 mL / NET: -1010 mL        RADIOLOGY & ADDITIONAL TESTS:  4/25: CTB: Acute left thalamic parenchymal hemorrhage with rim of perihematoma edema measuring 2.9 x 1.9 x 3.7 cm with mass effect upon the left lateral ventricle and 5 mm shift of the midline towards the right. Trace intraventricular hemorrhage in the left occipital horn.    4/25: CTH: short segment severe stenosis/occlusion of the right posterior cerebral artery proximal P3 branch. No evidence of cervical carotid or vertebral artery dissection.    4/25: CTH: stable    Assessment and Plan:   Assessment:  58M unknown PMHx developed sudden onset right-sided weakness and right facial droop, found to have L thalamic IPH. CTA H/N reports short segment severe stenosis/occlusion of right proximal P3 branch.    Plan:  - q2h neuro checks  - repeat 6hr CTH @ 10AM stable; f/u CT this AM   - SBP <160; s/p cardene gtt   -   - stroke neurology consulted; follow recs   - DVT ppx: SCDs only, hold chemoppx in setting of hemorrhage   - Supportive care/further medical management per NSICU  - To be discussed with Dr. Feldman    HPI:  59 y/o male unknown pmhx aside from 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 Patient LKW was about 0200 when he was headed into work this am. 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 EMS called. Patient came in as code stroke. Per son patient takes asa daily for his heart and is unsure of any other medications or medical history. Son reports patient had been complaining of HA for about 2-3weeks. In the ED /137. CTH showed LEFT thalamic IPH. NIHSS at time of examination was 17. Patient started on Cardene drip. MRs 0. ICH score 2.     1A: Level of consciousness       +1= Arouses to minor stimulation      1B: Ask month and age        +2= Aphasic    1C: "Blink eyes" and "Squeeze Hands"       +2= Performs 0 tasks    2: Horizontal EOMs       0= Normal       3: Visual fields       0= No visual loss    4: Facial palsy (use grimace if obtunded)        +2= Partial paralysis ( lower face)      5A: Left arm motor drift (count out loud and use fingers to show count)       0= No drift x 10 seconds    5B: Right arm motor drift       +2= Some effort against gravity    6A: Left leg motor drift       0= No drift x 10 seconds    6B: Right leg motor drift       +2= Some effort against gravity         7: Limb ataxia (FNF/heel-shin)       0= Does not understand        8: Sensation       +1= mild-moderate loss: less sharp/more dull    9: Language/aphasia- describe the scene (on mechelle); name the items; read the sentences (on mechelle)       +2= Severe aphasia: fragmentary expression, inference needed, cannot identify materials    10: Dysarthria- read the words       +2= Severe dysarthria: unintelligible slurring or out of proportion to dysphasia        11: Extinction/inattention        +1= Extinction to b/l simultaneous stimulation      NIHHS= 17 (25 Apr 2024 05:12)      INTERVAL HPI/OVERNIGHT EVENTS:  58y Male seen lying comfortably in bed. Tolerating diet. Gallegos in with clear urine. Denies headache, weakness, numbness, n/v/d, fevers, chills, chest pain, SOB.     Vital Signs Last 24 Hrs  T(C): 36.7 (25 Apr 2024 23:38), Max: 37 (25 Apr 2024 19:00)  T(F): 98 (25 Apr 2024 23:38), Max: 98.6 (25 Apr 2024 19:00)  HR: 72 (25 Apr 2024 19:00) (63 - 92)  BP: 135/83 (25 Apr 2024 19:00) (118/72 - 198/137)  BP(mean): 97 (25 Apr 2024 19:00) (97 - 119)  RR: 20 (25 Apr 2024 19:00) (16 - 27)  SpO2: 99% (25 Apr 2024 19:00) (88% - 100%)    Parameters below as of 25 Apr 2024 16:00  Patient On (Oxygen Delivery Method): nasal cannula  O2 Flow (L/min): 3      PHYSICAL EXAM:  GENERAL: NAD, well-groomed  VANCE COMA SCORE: E- V- M- = 15       E: 4= opens eyes spontaneously 3= to voice 2= to noxious 1= no opening       V: 5= oriented 4= confused 3= inappropriate words 2= incomprehensible sounds 1= nonverbal 1T= intubated       M: 6= follows commands 5= localizes 4= withdraws 3= flexor posturing 2= extensor posturing 1= no movement  MENTAL STATUS: AAO x3; Awake; Opens eyes spontaneously; Appropriately conversant with mild-moderate dysarthria and aphasia; following simple commands   CRANIAL NERVES: PERRL. EOMI without nystagmus. Facial sensation intact V1-3 distribution b/l. Face symmetric w/ normal eye closure and smile, tongue midline. Hearing grossly intact. Head turning and shoulder shrug intact.   MOTOR: strength 5/5 b/l upper and lower extremities  SENSATION: grossly intact to light touch all extremities  CHEST/LUNG: Chest rise and fall equally and bilaterally   HEART: +S1/+S2   ABDOMEN: Soft, nontender, nondistended   EXTREMITIES:  2+ peripheral pulses, no clubbing, cyanosis, or edema  SKIN: Warm, dry; no rashes or lesions    LABS:                        14.1   6.74  )-----------( 183      ( 25 Apr 2024 04:10 )             41.8     04-25    140  |  104  |  19.3  ----------------------------<  118<H>  3.8   |  23.0  |  0.86    Ca    8.8      25 Apr 2024 04:10    TPro  6.7  /  Alb  3.9  /  TBili  0.3<L>  /  DBili  x   /  AST  22  /  ALT  14  /  AlkPhos  86  04-25    PT/INR - ( 25 Apr 2024 04:10 )   PT: 11.4 sec;   INR: 1.03 ratio         PTT - ( 25 Apr 2024 04:10 )  PTT:29.4 sec  Urinalysis Basic - ( 25 Apr 2024 04:10 )    Color: x / Appearance: x / SG: x / pH: x  Gluc: 118 mg/dL / Ketone: x  / Bili: x / Urobili: x   Blood: x / Protein: x / Nitrite: x   Leuk Esterase: x / RBC: x / WBC x   Sq Epi: x / Non Sq Epi: x / Bacteria: x        04-24 @ 07:01 - 04-25 @ 07:00  --------------------------------------------------------  IN: 244 mL / OUT: 0 mL / NET: 244 mL    04-25 @ 07:01  - 04-26 @ 00:11  --------------------------------------------------------  IN: 720 mL / OUT: 1730 mL / NET: -1010 mL        RADIOLOGY & ADDITIONAL TESTS:  4/25: CTB: Acute left thalamic parenchymal hemorrhage with rim of perihematoma edema measuring 2.9 x 1.9 x 3.7 cm with mass effect upon the left lateral ventricle and 5 mm shift of the midline towards the right. Trace intraventricular hemorrhage in the left occipital horn.    4/25: CTH: short segment severe stenosis/occlusion of the right posterior cerebral artery proximal P3 branch. No evidence of cervical carotid or vertebral artery dissection.    4/25: CTH: stable

## 2024-04-26 NOTE — PROGRESS NOTE ADULT - ASSESSMENT
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 CTA H/N reports short segment severe stenosis/occlusion of right proximal P3 branch.    Plan:  - q2h neuro checks  - repeat 6hr CTH @ 10AM stable; f/u CT this AM   - SBP <160; s/p cardene gtt   -   - stroke neurology consulted; follow recs   - DVT ppx: SCDs only, hold chemoppx in setting of hemorrhage   - Supportive care/further medical management per NSICU  - To be discussed with Dr. Feldman       ADDENDUM 4/26/24 08:55:  - D/w Dr. Feldman  - CT this AM stable  - No absolute neurosurgical contraindication for downgrade out of ICU to stroke neurology  - neuro checks per stroke  - MR brain w/wo contrast pending  - Will f/u MRI when complete  - No absolute neurosurgical contraindication for chemical DVT ppx  - From neurosurgical perspective would recommend holding ASA 81 for at least 7 days, or as per stroke neurology

## 2024-04-26 NOTE — OCCUPATIONAL THERAPY INITIAL EVALUATION ADULT - ADDITIONAL COMMENTS
Pt is a questionable historian, obtained information through him, but unsure of accuracy  Pt has tub with doors  Pt does not own any DME  Pt is left handed

## 2024-04-26 NOTE — CHART NOTE - NSCHARTNOTEFT_GEN_A_CORE
attempted to call miguel Skaggs [phone] to update on hospital course and complete MRI checklist/contrast screening form x 2-- no answer, unable to leave message as no voicemail set up

## 2024-04-26 NOTE — PROGRESS NOTE ADULT - ASSESSMENT
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 4/25/24 for right sided facial droop and right hemiparesis, last known well 02:00 while he was on his way to work. Around 03:15 AM, his coworkers noted his acute weakness and called EMS. CTH obtained found with acute left thalamic IPH with mild regional mass effect and surrounding edema  - CTA without evidence of underlying vascular malformation  - Repeat CTH stable    PLAN:  - D/w Dr. Lopez  - D/w NSGY (Dr. Feldman)  - Stable for DG to telemetry to stroke neurology    NEURO:  - Q4 neuro checks, HOB 30 degrees  - Downgrade to stroke  - MR brain w/wo contrast pending  - Activity: [x] mobilize as tolerated [] Bedrest [x] PT [x] OT [] PMNR    PULM:  - SpO2 goal > 92%  - Incentive spirometry    CV:  - SBP goal 100-160  - TTE pending  - Off cardene, has not required PRNs  - PRN: Labetalol/hydralazine for SBP > 160  - LDL: 96    RENAL:  - Fluids: NS@ 60 since unable to take liquids at this time  - Voiding    GI:  - Diet: Pureed no liquid, switched to DASH to prevent HTN  - GI prophylaxis [x] not indicated [] PPI [] other:  - Bowel regimen [] colace [x] senna [x] other: miralax  - Last BM outpatient    ENDO:   - Goal euglycemia (-180)  - A1C 5.8 | TSH 1.39    HEME/ONC:  - VTE prophylaxis: [x] SCDs, start lovenox 40 tonight  - Hold ASA 81 x 7 days per NSGY  - Obtain baseline LED    ID:  - Monitor for fever/leukocytosis    SOCIAL/FAMILY:  [x] awaiting [] updated at bedside [] family meeting    CODE STATUS:  [x] Full Code [] DNR [] DNI [] Palliative/Comfort Care    DISPOSITION:  [] ICU [] Stroke Unit [x] Floor [] EMU [] RCU [] PCU 58y Male PMH 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 Around 03:15 AM, his coworkers noted his acute weakness and called EMS. CTH obtained found with acute left thalamic IPH with mild regional mass effect and surrounding edema  - CTA without evidence of underlying vascular malformation  - Repeat CTH stable    PLAN:  - D/w Dr. Lopez  - D/w NSGY (Dr. Feldman)  - Stable for DG to telemetry to stroke neurology    NEURO:  - Q4 neuro checks, HOB 30 degrees  - Downgrade to stroke  - MR brain w/wo contrast pending  - Activity: [x] mobilize as tolerated [] Bedrest [x] PT [x] OT [] PMNR    PULM:  - SpO2 goal > 92%  - Incentive spirometry    CV:  - SBP goal 100-160  - TTE pending  - Off cardene, has not required PRNs  - Start amlodipine 5 mg  - PRN: Labetalol/hydralazine for SBP > 160  - LDL: 96    RENAL:  - Fluids: NS@ 60 since unable to take liquids at this time  - Voiding    GI:  - Diet: Pureed no liquid, switched to DASH to prevent HTN  - GI prophylaxis [x] not indicated [] PPI [] other:  - Bowel regimen [] colace [x] senna [x] other: miralax  - Last BM outpatient    ENDO:   - Goal euglycemia (-180)  - A1C 5.8 | TSH 1.39    HEME/ONC:  - VTE prophylaxis: [x] SCDs, start lovenox 40 tonight  - Hold ASA 81 x 7 days per NSGY  - Obtain baseline LED    ID:  - Monitor for fever/leukocytosis    SOCIAL/FAMILY:  [x] awaiting [] updated at bedside [] family meeting    CODE STATUS:  [x] Full Code [] DNR [] DNI [] Palliative/Comfort Care    DISPOSITION:  [] ICU [] Stroke Unit [x] Floor [] EMU [] RCU [] PCU 58y Male PMH 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 Around 03:15 AM, his coworkers noted his acute weakness and called EMS. CTH obtained found with acute left thalamic IPH with mild regional mass effect and surrounding edema  - CTA without evidence of underlying vascular malformation  - Repeat CTH stable    PLAN:  - D/w Dr. Lopez  - D/w NSGY (Dr. Feldman)  - Stable for DG to telemetry to stroke neurology    NEURO:  - Q4 neuro checks, HOB 30 degrees  - Downgrade to stroke  - MR brain w/wo contrast pending- attempted to call son Michel Skaggs [phone] to complete MRI checklist/contrast screening form x 2-- no answer, unable to leave message as no voicemail set up  - Activity: [x] mobilize as tolerated [] Bedrest [x] PT [x] OT [] PMNR    PULM:  - SpO2 goal > 92%  - Incentive spirometry    CV:  - SBP goal 100-160  - TTE pending  - Off cardene, has not required PRNs  - Start amlodipine 5 mg  - PRN: Labetalol/hydralazine for SBP > 160  - LDL: 96    RENAL:  - Fluids: NS@ 60 since unable to take liquids at this time  - Voiding    GI:  - Diet: Pureed no liquid, switched to DASH to prevent HTN  - GI prophylaxis [x] not indicated [] PPI [] other:  - Bowel regimen [] colace [x] senna [x] other: miralax  - Last BM outpatient    ENDO:   - Goal euglycemia (-180)  - A1C 5.8 | TSH 1.39    HEME/ONC:  - VTE prophylaxis: [x] SCDs, start lovenox 40 tonight  - Hold ASA 81 x 7 days per NSGY  - Obtain baseline LED    ID:  - Monitor for fever/leukocytosis    SOCIAL/FAMILY:  [x] awaiting [] updated at bedside [] family meeting    CODE STATUS:  [x] Full Code [] DNR [] DNI [] Palliative/Comfort Care    DISPOSITION:  [] ICU [] Stroke Unit [x] Floor [] EMU [] RCU [] PCU

## 2024-04-26 NOTE — OCCUPATIONAL THERAPY INITIAL EVALUATION ADULT - PHYSICAL ASSIST/NONPHYSICAL ASSIST: SUPINE/SIT, REHAB EVAL
Pt verbalized understanding to discharge instructions and medications. All questions answered. Pt left with all belongings accompany by PCA to private vehicle.
1 person assist

## 2024-04-26 NOTE — PHYSICAL THERAPY INITIAL EVALUATION ADULT - GENERAL OBSERVATIONS, REHAB EVAL
Patient received lying in bed, NAD, breathing O2 via NC, +tele/, +lynch. Pt Vietnamese-speaking and agreeable to Physical Therapy evaluation.

## 2024-04-26 NOTE — PHYSICAL THERAPY INITIAL EVALUATION ADULT - PERTINENT HX OF CURRENT PROBLEM, REHAB EVAL
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 Around 03:15 AM, his coworkers noted his acute weakness and called EMS. CTH obtained found with acute left thalamic IPH with mild regional mass effect and surrounding edema

## 2024-04-27 LAB
ANION GAP SERPL CALC-SCNC: 10 MMOL/L — SIGNIFICANT CHANGE UP (ref 5–17)
ANION GAP SERPL CALC-SCNC: 11 MMOL/L — SIGNIFICANT CHANGE UP (ref 5–17)
BUN SERPL-MCNC: 13.9 MG/DL — SIGNIFICANT CHANGE UP (ref 8–20)
BUN SERPL-MCNC: 14.3 MG/DL — SIGNIFICANT CHANGE UP (ref 8–20)
CALCIUM SERPL-MCNC: 8.5 MG/DL — SIGNIFICANT CHANGE UP (ref 8.4–10.5)
CALCIUM SERPL-MCNC: 8.5 MG/DL — SIGNIFICANT CHANGE UP (ref 8.4–10.5)
CHLORIDE SERPL-SCNC: 102 MMOL/L — SIGNIFICANT CHANGE UP (ref 96–108)
CHLORIDE SERPL-SCNC: 104 MMOL/L — SIGNIFICANT CHANGE UP (ref 96–108)
CO2 SERPL-SCNC: 24 MMOL/L — SIGNIFICANT CHANGE UP (ref 22–29)
CO2 SERPL-SCNC: 25 MMOL/L — SIGNIFICANT CHANGE UP (ref 22–29)
CREAT SERPL-MCNC: 0.75 MG/DL — SIGNIFICANT CHANGE UP (ref 0.5–1.3)
CREAT SERPL-MCNC: 0.76 MG/DL — SIGNIFICANT CHANGE UP (ref 0.5–1.3)
EGFR: 105 ML/MIN/1.73M2 — SIGNIFICANT CHANGE UP
EGFR: 106 ML/MIN/1.73M2 — SIGNIFICANT CHANGE UP
GLUCOSE BLDC GLUCOMTR-MCNC: 107 MG/DL — HIGH (ref 70–99)
GLUCOSE BLDC GLUCOMTR-MCNC: 129 MG/DL — HIGH (ref 70–99)
GLUCOSE BLDC GLUCOMTR-MCNC: 87 MG/DL — SIGNIFICANT CHANGE UP (ref 70–99)
GLUCOSE BLDC GLUCOMTR-MCNC: 92 MG/DL — SIGNIFICANT CHANGE UP (ref 70–99)
GLUCOSE SERPL-MCNC: 108 MG/DL — HIGH (ref 70–99)
GLUCOSE SERPL-MCNC: 87 MG/DL — SIGNIFICANT CHANGE UP (ref 70–99)
HCT VFR BLD CALC: 41 % — SIGNIFICANT CHANGE UP (ref 39–50)
HGB BLD-MCNC: 14 G/DL — SIGNIFICANT CHANGE UP (ref 13–17)
MAGNESIUM SERPL-MCNC: 2 MG/DL — SIGNIFICANT CHANGE UP (ref 1.6–2.6)
MCHC RBC-ENTMCNC: 30.1 PG — SIGNIFICANT CHANGE UP (ref 27–34)
MCHC RBC-ENTMCNC: 34.1 GM/DL — SIGNIFICANT CHANGE UP (ref 32–36)
MCV RBC AUTO: 88.2 FL — SIGNIFICANT CHANGE UP (ref 80–100)
PHOSPHATE SERPL-MCNC: 2.6 MG/DL — SIGNIFICANT CHANGE UP (ref 2.4–4.7)
PLATELET # BLD AUTO: 174 K/UL — SIGNIFICANT CHANGE UP (ref 150–400)
POTASSIUM SERPL-MCNC: 3.5 MMOL/L — SIGNIFICANT CHANGE UP (ref 3.5–5.3)
POTASSIUM SERPL-MCNC: 3.5 MMOL/L — SIGNIFICANT CHANGE UP (ref 3.5–5.3)
POTASSIUM SERPL-SCNC: 3.5 MMOL/L — SIGNIFICANT CHANGE UP (ref 3.5–5.3)
POTASSIUM SERPL-SCNC: 3.5 MMOL/L — SIGNIFICANT CHANGE UP (ref 3.5–5.3)
RBC # BLD: 4.65 M/UL — SIGNIFICANT CHANGE UP (ref 4.2–5.8)
RBC # FLD: 14.2 % — SIGNIFICANT CHANGE UP (ref 10.3–14.5)
SODIUM SERPL-SCNC: 137 MMOL/L — SIGNIFICANT CHANGE UP (ref 135–145)
SODIUM SERPL-SCNC: 139 MMOL/L — SIGNIFICANT CHANGE UP (ref 135–145)
WBC # BLD: 10.86 K/UL — HIGH (ref 3.8–10.5)
WBC # FLD AUTO: 10.86 K/UL — HIGH (ref 3.8–10.5)

## 2024-04-27 PROCEDURE — 99233 SBSQ HOSP IP/OBS HIGH 50: CPT

## 2024-04-27 PROCEDURE — 93010 ELECTROCARDIOGRAM REPORT: CPT

## 2024-04-27 RX ORDER — POTASSIUM CHLORIDE 20 MEQ
40 PACKET (EA) ORAL ONCE
Refills: 0 | Status: COMPLETED | OUTPATIENT
Start: 2024-04-27 | End: 2024-04-27

## 2024-04-27 RX ADMIN — Medication 10 MILLIGRAM(S): at 20:09

## 2024-04-27 RX ADMIN — AMLODIPINE BESYLATE 5 MILLIGRAM(S): 2.5 TABLET ORAL at 06:02

## 2024-04-27 RX ADMIN — ENOXAPARIN SODIUM 40 MILLIGRAM(S): 100 INJECTION SUBCUTANEOUS at 21:59

## 2024-04-27 RX ADMIN — Medication 40 MILLIEQUIVALENT(S): at 21:59

## 2024-04-27 RX ADMIN — CHLORHEXIDINE GLUCONATE 1 APPLICATION(S): 213 SOLUTION TOPICAL at 06:06

## 2024-04-27 RX ADMIN — SENNA PLUS 2 TABLET(S): 8.6 TABLET ORAL at 21:59

## 2024-04-27 NOTE — DIETITIAN INITIAL EVALUATION ADULT - PERTINENT MEDS FT
MEDICATIONS  (STANDING):  amLODIPine   Tablet 5 milliGRAM(s) Oral daily  chlorhexidine 2% Cloths 1 Application(s) Topical daily  dextrose 50% Injectable 25 Gram(s) IV Push once  enoxaparin Injectable 40 milliGRAM(s) SubCutaneous <User Schedule>  glucagon  Injectable 1 milliGRAM(s) IntraMuscular once  insulin lispro (ADMELOG) corrective regimen sliding scale   SubCutaneous three times a day before meals  polyethylene glycol 3350 17 Gram(s) Oral daily  senna 2 Tablet(s) Oral at bedtime  sodium chloride 0.9%. 1000 milliLiter(s) (60 mL/Hr) IV Continuous <Continuous>    MEDICATIONS  (PRN):  dextrose Oral Gel 15 Gram(s) Oral once PRN Blood Glucose LESS THAN 70 milliGRAM(s)/deciliter  hydrALAZINE Injectable 10 milliGRAM(s) IV Push every 2 hours PRN SBP> 160  labetalol Injectable 10 milliGRAM(s) IV Push every 2 hours PRN SBP> 160

## 2024-04-27 NOTE — PROGRESS NOTE ADULT - SUBJECTIVE AND OBJECTIVE BOX
INCOMPLETE NOTE ****************  Preliminary note, official recommendations pending attending review/signature   Seen and examined by Stroke team attending/team, assessment/ plan as discussed with stroke team attending/team as noted.     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  Per notes, was headed into work and his co-workers noted he suddenly stopped working and had a facial droop.  Initial /137. CTH showed LEFT thalamic IPH. Patient started on Cardene drip and sent to Neuro ICU.        SUBJECTIVE: No events overnight.  No new neurologic complaints.  ROS reported negative unless otherwise noted.    amLODIPine   Tablet 5 milliGRAM(s) Oral daily  chlorhexidine 2% Cloths 1 Application(s) Topical daily  dextrose 10% Bolus 125 milliLiter(s) IV Bolus once  dextrose 5%. 1000 milliLiter(s) IV Continuous <Continuous>  dextrose 5%. 1000 milliLiter(s) IV Continuous <Continuous>  dextrose 50% Injectable 12.5 Gram(s) IV Push once  dextrose 50% Injectable 25 Gram(s) IV Push once  dextrose Oral Gel 15 Gram(s) Oral once PRN  enoxaparin Injectable 40 milliGRAM(s) SubCutaneous <User Schedule>  glucagon  Injectable 1 milliGRAM(s) IntraMuscular once  hydrALAZINE Injectable 10 milliGRAM(s) IV Push every 2 hours PRN  insulin lispro (ADMELOG) corrective regimen sliding scale   SubCutaneous three times a day before meals  labetalol Injectable 10 milliGRAM(s) IV Push every 2 hours PRN  polyethylene glycol 3350 17 Gram(s) Oral daily  senna 2 Tablet(s) Oral at bedtime  sodium chloride 0.9%. 1000 milliLiter(s) IV Continuous <Continuous>      PHYSICAL EXAM:   Vital Signs Last 24 Hrs  T(C): 37.1 (27 Apr 2024 07:51), Max: 37.2 (26 Apr 2024 16:31)  T(F): 98.7 (27 Apr 2024 07:51), Max: 99 (26 Apr 2024 16:31)  HR: 65 (27 Apr 2024 08:00) (59 - 89)  BP: 133/91 (27 Apr 2024 08:00) (127/83 - 160/107)  BP(mean): 103 (27 Apr 2024 08:00) (92 - 121)  RR: 21 (27 Apr 2024 08:00) (16 - 31)  SpO2: 96% (27 Apr 2024 08:00) (95% - 100%)    Parameters below as of 27 Apr 2024 08:00  Patient On (Oxygen Delivery Method): nasal cannula  O2 Flow (L/min): 3    EXAM PENDING ************************    General: No acute distress.   HEENT: Head normocephalic, atraumatic. Conjunctivae clear w/o exudates or hemorrhage.  Neck: Supple, no adenopathy. Trachea midline. No JVD.     Respiratory: Chest wall symmetric, nontender.   Abdominal: Soft, nondistended, nontender.  Skin: Skin is warm, dry and intact without rashes or lesions.   Extremities: No edema.     Detailed Neurologic Exam:    Mental status: The patient is drowsy but awakens. Speech is severely dysarthric but he follows commands, can tell me his age and year and month.     Cranial nerves: Pupils equal and react symmetrically to light. R eye blind. Extraocular motion is full with no nystagmus. There is no ptosis. R facial droop, Palate elevates symmetrically. Tongue is midline.    Motor: There is normal bulk and tone.  There is no tremor.  Strength is +3/5 in R shoulder, -4/5 elbow extension, +4/5 elbow flexion, 4 in , 5/5 in RLE  Strength is 5/5 in the left arm and leg.    Sensation: Intact to light touch and pin in 4 extremities    Reflexes: deferred.    Cerebellar: Cannot test on right    Gait : deferred        LABS:                        14.0   10.86 )-----------( 174      ( 27 Apr 2024 03:30 )             41.0    04-27    139  |  104  |  13.9  ----------------------------<  108<H>  3.5   |  25.0  |  0.76    Ca    8.5      27 Apr 2024 03:30  Phos  2.6     04-27  Mg     2.0     04-27 04-25 Chol 156 LDL -- HDL 48 Trig 59    A1C: 5.6      IMAGING: Reviewed by me.     CT Head No Cont (04.26.24 @ 06:16)   IMPRESSION:  Left thalamocapsular hemorrhage is stable       CT Head No Cont (04.25.24 @ 10:31)   1.  Grossly stable left thalamic hemorrhage.  2.  Midline shift to the right of approximately 0.4 cm.  3.  Chronic ischemic changes.  4.  Redemonstrated partially calcified masslike density in the right   nasal passages/ethmoid air cells, grossly stable.      CT Angio Neck Stroke Protocol w/ IV Cont (04.25.24 @ 04:26)   CTA NECK:  No significant stenosis of the cervical carotid arteries based   on NASCET criteria. Patent cervical vertebral arteries. No evidence of   cervical carotid or vertebral artery dissection.    Polypoid cystic and partially calcified mass in the right nasal passage   with protrudes into the knee nasopharynx and involves the right ethmoid   air cells. Near complete opacification of the right frontal sinus, right   ethmoid air cells, and right sphenoid sinus. Direct endoscopic   visualization is recommended. Correlation with outside prior imaging   would be helpful.    Mild pulmonary interstitial lung edema and small bilateral pleural   effusions.    CTA HEAD:  No high-grade stenosis or occlusion of the major proximal   arterial branches, however, short segment severe stenosis/occlusion of   the right posterior cerebral artery proximal P3 branch. No evidence of an   intracranial arterial aneurysm or arteriovenous malformation on CTA,   specifically, no evidence of vascular malformation in the region of the   left thalamic hemorrhage. No evidence of active extravasation of contrast   within the left thalamic parenchymal hemorrhage.        TTE W or WO Ultrasound Enhancing Agent (04.26.24 @ 12:43)   CONCLUSIONS:      1. Left ventricular cavity is normal in size. Left ventricular systolic function is normal with an ejection fraction visually estimated at 55 to 60 %.   2. Severe asymmetric left ventricular hypertrophy.   3. Normal right ventricular cavity size and normal systolic function.   4. The left atrium is severely dilated.   5. The right atrium is normal in size.   6. Thickened mitral valve leaflets.   7. Mobile structure noted on the posterior mitral valve leaflet may represent ruptured chordae. Cannot rule out vegetation.   8. Severe mitral regurgitation.   9. Trileaflet aortic valve with normal systolic excursion.  10. Mild aortic regurgitation.  11. Mild pulmonic regurgitation.  12. No pericardial effusion seen.  13. Estimated pulmonary artery systolic pressure is 59 mmHg, consistent with moderate-to-severe pulmonary hypertension.               Preliminary note, official recommendations pending attending review/signature   Seen and examined by Stroke team attending/team, assessment/ plan as discussed with stroke team attending/team as noted.     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  Per notes, was headed into work and his co-workers noted he suddenly stopped working and had a facial droop.  Initial /137. CTH showed LEFT thalamic IPH. Patient started on Cardene drip and sent to Neuro ICU.        SUBJECTIVE: No events overnight.  No new neurologic complaints.  ROS reported negative unless otherwise noted.    amLODIPine   Tablet 5 milliGRAM(s) Oral daily  chlorhexidine 2% Cloths 1 Application(s) Topical daily  dextrose 10% Bolus 125 milliLiter(s) IV Bolus once  dextrose 5%. 1000 milliLiter(s) IV Continuous <Continuous>  dextrose 5%. 1000 milliLiter(s) IV Continuous <Continuous>  dextrose 50% Injectable 12.5 Gram(s) IV Push once  dextrose 50% Injectable 25 Gram(s) IV Push once  dextrose Oral Gel 15 Gram(s) Oral once PRN  enoxaparin Injectable 40 milliGRAM(s) SubCutaneous <User Schedule>  glucagon  Injectable 1 milliGRAM(s) IntraMuscular once  hydrALAZINE Injectable 10 milliGRAM(s) IV Push every 2 hours PRN  insulin lispro (ADMELOG) corrective regimen sliding scale   SubCutaneous three times a day before meals  labetalol Injectable 10 milliGRAM(s) IV Push every 2 hours PRN  polyethylene glycol 3350 17 Gram(s) Oral daily  senna 2 Tablet(s) Oral at bedtime  sodium chloride 0.9%. 1000 milliLiter(s) IV Continuous <Continuous>      PHYSICAL EXAM:   Vital Signs Last 24 Hrs  T(C): 37.1 (27 Apr 2024 07:51), Max: 37.2 (26 Apr 2024 16:31)  T(F): 98.7 (27 Apr 2024 07:51), Max: 99 (26 Apr 2024 16:31)  HR: 65 (27 Apr 2024 08:00) (59 - 89)  BP: 133/91 (27 Apr 2024 08:00) (127/83 - 160/107)  BP(mean): 103 (27 Apr 2024 08:00) (92 - 121)  RR: 21 (27 Apr 2024 08:00) (16 - 31)  SpO2: 96% (27 Apr 2024 08:00) (95% - 100%)    Parameters below as of 27 Apr 2024 08:00  Patient On (Oxygen Delivery Method): nasal cannula  O2 Flow (L/min): 3      General: No acute distress.   HEENT: Head normocephalic, atraumatic. Conjunctivae clear w/o exudates or hemorrhage.  Neck: Supple, no adenopathy. Trachea midline. No JVD.     Respiratory: Chest wall symmetric, nontender.   Abdominal: Soft, nondistended, nontender.  Skin: Skin is warm, dry and intact without rashes or lesions.   Extremities: No edema.     Detailed Neurologic Exam:    Mental status: The patient is awake in chair at bedside. Speech is severely dysarthric but he follows commands - shows 2 fingers, perseverating - states day - March 5, 1984        Cranial nerves: Pupils equal and react symmetrically to light. R eye blind. Extraocular motion is full with no nystagmus. There is no ptosis. R facial droop, Palate elevates symmetrically. Tongue is midline.    Motor: There is normal bulk and tone.  There is no tremor.  Strength is +3/5 in R shoulder, -4/5 elbow extension, +4/5 elbow flexion, 4 in , -5/5 in RLE  Strength is 5/5 in the left arm and leg.    Sensation: Intact to light touch in 4 extremities    Reflexes: deferred.    Cerebellar: Cannot test on right    Gait : deferred        LABS:                        14.0   10.86 )-----------( 174      ( 27 Apr 2024 03:30 )             41.0    04-27    139  |  104  |  13.9  ----------------------------<  108<H>  3.5   |  25.0  |  0.76    Ca    8.5      27 Apr 2024 03:30  Phos  2.6     04-27  Mg     2.0     04-27 04-25 Chol 156 LDL -- HDL 48 Trig 59    A1C: 5.6      IMAGING: Reviewed by me.     CT Head No Cont (04.26.24 @ 06:16)   IMPRESSION:  Left thalamocapsular hemorrhage is stable       CT Head No Cont (04.25.24 @ 10:31)   1.  Grossly stable left thalamic hemorrhage.  2.  Midline shift to the right of approximately 0.4 cm.  3.  Chronic ischemic changes.  4.  Redemonstrated partially calcified masslike density in the right   nasal passages/ethmoid air cells, grossly stable.      CT Angio Neck Stroke Protocol w/ IV Cont (04.25.24 @ 04:26)   CTA NECK:  No significant stenosis of the cervical carotid arteries based   on NASCET criteria. Patent cervical vertebral arteries. No evidence of   cervical carotid or vertebral artery dissection.    Polypoid cystic and partially calcified mass in the right nasal passage   with protrudes into the knee nasopharynx and involves the right ethmoid   air cells. Near complete opacification of the right frontal sinus, right   ethmoid air cells, and right sphenoid sinus. Direct endoscopic   visualization is recommended. Correlation with outside prior imaging   would be helpful.    Mild pulmonary interstitial lung edema and small bilateral pleural   effusions.    CTA HEAD:  No high-grade stenosis or occlusion of the major proximal   arterial branches, however, short segment severe stenosis/occlusion of   the right posterior cerebral artery proximal P3 branch. No evidence of an   intracranial arterial aneurysm or arteriovenous malformation on CTA,   specifically, no evidence of vascular malformation in the region of the   left thalamic hemorrhage. No evidence of active extravasation of contrast   within the left thalamic parenchymal hemorrhage.        TTE W or WO Ultrasound Enhancing Agent (04.26.24 @ 12:43)   CONCLUSIONS:      1. Left ventricular cavity is normal in size. Left ventricular systolic function is normal with an ejection fraction visually estimated at 55 to 60 %.   2. Severe asymmetric left ventricular hypertrophy.   3. Normal right ventricular cavity size and normal systolic function.   4. The left atrium is severely dilated.   5. The right atrium is normal in size.   6. Thickened mitral valve leaflets.   7. Mobile structure noted on the posterior mitral valve leaflet may represent ruptured chordae. Cannot rule out vegetation.   8. Severe mitral regurgitation.   9. Trileaflet aortic valve with normal systolic excursion.  10. Mild aortic regurgitation.  11. Mild pulmonic regurgitation.  12. No pericardial effusion seen.  13. Estimated pulmonary artery systolic pressure is 59 mmHg, consistent with moderate-to-severe pulmonary hypertension.             Seen and examined by Stroke team attending/team, assessment/ plan as discussed with stroke team attending/team as noted.     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  Per notes, was headed into work and his co-workers noted he suddenly stopped working and had a facial droop.  Initial /137. CTH showed LEFT thalamic IPH. Patient started on Cardene drip and sent to Neuro ICU.        SUBJECTIVE: No events overnight.  No new neurologic complaints.  ROS reported negative unless otherwise noted.    amLODIPine   Tablet 5 milliGRAM(s) Oral daily  chlorhexidine 2% Cloths 1 Application(s) Topical daily  dextrose 10% Bolus 125 milliLiter(s) IV Bolus once  dextrose 5%. 1000 milliLiter(s) IV Continuous <Continuous>  dextrose 5%. 1000 milliLiter(s) IV Continuous <Continuous>  dextrose 50% Injectable 12.5 Gram(s) IV Push once  dextrose 50% Injectable 25 Gram(s) IV Push once  dextrose Oral Gel 15 Gram(s) Oral once PRN  enoxaparin Injectable 40 milliGRAM(s) SubCutaneous <User Schedule>  glucagon  Injectable 1 milliGRAM(s) IntraMuscular once  hydrALAZINE Injectable 10 milliGRAM(s) IV Push every 2 hours PRN  insulin lispro (ADMELOG) corrective regimen sliding scale   SubCutaneous three times a day before meals  labetalol Injectable 10 milliGRAM(s) IV Push every 2 hours PRN  polyethylene glycol 3350 17 Gram(s) Oral daily  senna 2 Tablet(s) Oral at bedtime  sodium chloride 0.9%. 1000 milliLiter(s) IV Continuous <Continuous>      PHYSICAL EXAM:   Vital Signs Last 24 Hrs  T(C): 37.1 (27 Apr 2024 07:51), Max: 37.2 (26 Apr 2024 16:31)  T(F): 98.7 (27 Apr 2024 07:51), Max: 99 (26 Apr 2024 16:31)  HR: 65 (27 Apr 2024 08:00) (59 - 89)  BP: 133/91 (27 Apr 2024 08:00) (127/83 - 160/107)  BP(mean): 103 (27 Apr 2024 08:00) (92 - 121)  RR: 21 (27 Apr 2024 08:00) (16 - 31)  SpO2: 96% (27 Apr 2024 08:00) (95% - 100%)    Parameters below as of 27 Apr 2024 08:00  Patient On (Oxygen Delivery Method): nasal cannula  O2 Flow (L/min): 3      General: No acute distress.   HEENT: Head normocephalic, atraumatic. Conjunctivae clear w/o exudates or hemorrhage.  Neck: Supple, no adenopathy. Trachea midline. No JVD.     Respiratory: Chest wall symmetric, nontender.   Abdominal: Soft, nondistended, nontender.  Skin: Skin is warm, dry and intact without rashes or lesions.   Extremities: No edema.     Detailed Neurologic Exam:    Mental status: The patient is awake in chair at bedside. Speech is severely dysarthric but he follows commands - shows 2 fingers, perseverating - states day - March 5, 1984        Cranial nerves: Pupils equal and react symmetrically to light. R eye blind. Extraocular motion is full with no nystagmus. There is no ptosis. R facial droop, Palate elevates symmetrically. Tongue is midline.    Motor: There is normal bulk and tone.  There is no tremor.  Strength is +3/5 in R shoulder, -4/5 elbow extension, +4/5 elbow flexion, 4 in , -5/5 in RLE  Strength is 5/5 in the left arm and leg.    Sensation: Intact to light touch in 4 extremities    Reflexes: deferred.    Cerebellar: Cannot test on right    Gait : deferred        LABS:                        14.0   10.86 )-----------( 174      ( 27 Apr 2024 03:30 )             41.0    04-27    139  |  104  |  13.9  ----------------------------<  108<H>  3.5   |  25.0  |  0.76    Ca    8.5      27 Apr 2024 03:30  Phos  2.6     04-27  Mg     2.0     04-27 04-25 Chol 156 LDL -- HDL 48 Trig 59    A1C: 5.6      IMAGING: Reviewed by me.     CT Head No Cont (04.26.24 @ 06:16)   IMPRESSION:  Left thalamocapsular hemorrhage is stable       CT Head No Cont (04.25.24 @ 10:31)   1.  Grossly stable left thalamic hemorrhage.  2.  Midline shift to the right of approximately 0.4 cm.  3.  Chronic ischemic changes.  4.  Redemonstrated partially calcified masslike density in the right   nasal passages/ethmoid air cells, grossly stable.      CT Angio Neck Stroke Protocol w/ IV Cont (04.25.24 @ 04:26)   CTA NECK:  No significant stenosis of the cervical carotid arteries based   on NASCET criteria. Patent cervical vertebral arteries. No evidence of   cervical carotid or vertebral artery dissection.    Polypoid cystic and partially calcified mass in the right nasal passage   with protrudes into the knee nasopharynx and involves the right ethmoid   air cells. Near complete opacification of the right frontal sinus, right   ethmoid air cells, and right sphenoid sinus. Direct endoscopic   visualization is recommended. Correlation with outside prior imaging   would be helpful.    Mild pulmonary interstitial lung edema and small bilateral pleural   effusions.    CTA HEAD:  No high-grade stenosis or occlusion of the major proximal   arterial branches, however, short segment severe stenosis/occlusion of   the right posterior cerebral artery proximal P3 branch. No evidence of an   intracranial arterial aneurysm or arteriovenous malformation on CTA,   specifically, no evidence of vascular malformation in the region of the   left thalamic hemorrhage. No evidence of active extravasation of contrast   within the left thalamic parenchymal hemorrhage.        TTE W or WO Ultrasound Enhancing Agent (04.26.24 @ 12:43)   CONCLUSIONS:      1. Left ventricular cavity is normal in size. Left ventricular systolic function is normal with an ejection fraction visually estimated at 55 to 60 %.   2. Severe asymmetric left ventricular hypertrophy.   3. Normal right ventricular cavity size and normal systolic function.   4. The left atrium is severely dilated.   5. The right atrium is normal in size.   6. Thickened mitral valve leaflets.   7. Mobile structure noted on the posterior mitral valve leaflet may represent ruptured chordae. Cannot rule out vegetation.   8. Severe mitral regurgitation.   9. Trileaflet aortic valve with normal systolic excursion.  10. Mild aortic regurgitation.  11. Mild pulmonic regurgitation.  12. No pericardial effusion seen.  13. Estimated pulmonary artery systolic pressure is 59 mmHg, consistent with moderate-to-severe pulmonary hypertension.

## 2024-04-27 NOTE — PROGRESS NOTE ADULT - ASSESSMENT
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  Per notes, was headed into work and his co-workers noted he suddenly stopped working and had a facial droop.  Initial /137. CTH showed LEFT thalamic IPH. Patient started on Cardene drip and sent to Neuro ICU.  CTA head and neck neg except for stenosis of the R P3 segment, but no vascular malformation or aneurysms.     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       -CARDIOVASCULAR: check TTE, cardiac monitoring w/ telemetry for now, further evaluation pending findings of noted workup                              -HEMATOLOGY: H/H _, Platelets __, patient should have all age and risk appropriate malignancy screenings with PCP or sooner if clinically suspected      DVT ppx: Heparin s.c [] LMWH []     PULMONARY: CXR ___ , protecting airway, saturating well     RENAL: BUN/Cr _, monitor urine output, maintain adequate hydration       Na Goal:  135-145     Gallegos:    ID: afebrile, no leukocytosis, monitor for si/sx of infection     OTHER:  condition and plan of care d/w patient, questions and concerns addressed.     DISPOSITION: Rehab or home depending on PT eval once stable and workup is complete      CORE MEASURES:        Admission NIHSS:      Tenecteplase : [] YES [] NO      LDL/HDL/A1C:     Depression Screen- if depression hx and/or present      Statin Therapy:     Dysphagia Screen: [] PASS [] FAIL     Smoking [] YES [] NO      Afib [] YES [] NO     Stroke Education [] YES [] NO    Obtain screening lower extremity venous ultrasound in patients who meet 1 or more of the following criteria as patient is high risk for DVT/PE on admission:   [] History of DVT/PE  []Hypercoagulable states (Factor V Leiden, Cancer, OCP, etc. 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  Per notes, was headed into work and his co-workers noted he suddenly stopped working and had a facial droop.  Initial /137. CTH showed LEFT thalamic IPH. Patient started on Cardene drip and sent to Neuro ICU.  CTA head and neck neg except for stenosis of the R P3 segment, but no vascular malformation or aneurysms.     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       CARDIOVASCULAR:   -check TTE noted, EF 55-60% with severely dilated left atrium, cardiac monitoring w/ telemetry for now, further evaluation pending findings of noted workup                              HEMATOLOGY:  -H/H 14.0/41.0, Platelets 174, patient should have all age and risk appropriate malignancy screenings with PCP or sooner if clinically suspected   -DVT ppx: Heparin s.c [] LMWH [x]     PULMONARY:   -On room air, protecting airway, saturating well     RENAL:   -BUN/Cr 13.9/0.76, monitor urine output, maintain adequate hydration    -Na Goal:  135-145  -Gallegos:    ID:   -afebrile, no leukocytosis, monitor for si/sx of infection     OTHER:    -condition and plan of care d/w patient, questions and concerns addressed.     DISPOSITION:   -Rehab or home depending on PT eval once stable and workup is complete      CORE MEASURES:        Admission NIHSS:      Tenecteplase : [] YES [x] NO      LDL/HDL/A1C: 96/48/5.8     Depression Screen- if depression hx and/or present      Statin Therapy:     Dysphagia Screen: [] PASS [] FAIL     Smoking [] YES [] NO      Afib [] YES [x] NO     Stroke Education [] YES [] NO - p    Obtain screening lower extremity venous ultrasound in patients who meet 1 or more of the following criteria as patient is high risk for DVT/PE on admission:   [] History of DVT/PE  []Hypercoagulable states (Factor V Leiden, Cancer, OCP, etc. 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  Per notes, was headed into work and his co-workers noted he suddenly stopped working and had a facial droop.  Initial /137. CTH showed LEFT thalamic IPH. Patient started on Cardene drip and sent to Neuro ICU.  CTA head and neck neg except for stenosis of the R P3 segment, but no vascular malformation or aneurysms.     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      NEURO:   -Neurologically with right sided weakness   -Continue close monitoring for neurologic deterioration    - Stroke neuro checks q 2hr    - SBP goal < 160 mmHg    -ANTITHROMBOTIC THERAPY: on hold due to recent ICH   -titrate statin to LDL goal less than 70  -MRI Brain w/wo pending   -Dysphagia screen: pass  -Physical therapy/OT/Speech eval/treatment.       CARDIOVASCULAR:   -check TTE noted, EF 55-60% with severely dilated left atrium, cardiac monitoring w/ telemetry for now, further evaluation pending findings of noted workup                              HEMATOLOGY:  -H/H 14.0/41.0, Platelets 174, patient should have all age and risk appropriate malignancy screenings with PCP or sooner if clinically suspected   -DVT ppx: Heparin s.c [] LMWH [x]     PULMONARY:   -On room air, protecting airway, saturating well     RENAL:   -BUN/Cr 13.9/0.76, monitor urine output, maintain adequate hydration    -Na Goal:  135-145  -Gallegos:    ID:   -afebrile, no leukocytosis, monitor for si/sx of infection     OTHER:    -condition and plan of care d/w patient, questions and concerns addressed.     DISPOSITION:   -Rehab or home depending on PT eval once stable and workup is complete      CORE MEASURES:        Admission NIHSS:      Tenecteplase : [] YES [x] NO      LDL/HDL/A1C: 96/48/5.8     Depression Screen- if depression hx and/or present      Statin Therapy:     Dysphagia Screen: [] PASS [] FAIL     Smoking [] YES [] NO      Afib [] YES [x] NO     Stroke Education [] YES [] NO - p    Obtain screening lower extremity venous ultrasound in patients who meet 1 or more of the following criteria as patient is high risk for DVT/PE on admission:   [] History of DVT/PE  []Hypercoagulable states (Factor V Leiden, Cancer, OCP, etc. )  []Prolonged immobility (hemiplegia/hemiparesis/post operative or any other extended immobilization)  [] Transferred from outside facility (Rehab or Long term care)  [] Age </= to 50

## 2024-04-27 NOTE — DIETITIAN INITIAL EVALUATION ADULT - PERTINENT LABORATORY DATA
04-27    139  |  104  |  13.9  ----------------------------<  108<H>  3.5   |  25.0  |  0.76    Ca    8.5      27 Apr 2024 03:30  Phos  2.6     04-27  Mg     2.0     04-27    POCT Blood Glucose.: 92 mg/dL (04-27-24 @ 07:29)  A1C with Estimated Average Glucose Result: 5.6 % (04-26-24 @ 02:15)  A1C with Estimated Average Glucose Result: 5.8 % (04-25-24 @ 07:49)

## 2024-04-27 NOTE — DIETITIAN INITIAL EVALUATION ADULT - LAB (SPECIFY)
----- Message from Lorrie Tatum MA sent at 11/28/2022  3:59 PM CST -----  Contact: self  Xiomara Darden  MRN: [id number]  Home Phone      [phone]  Work Phone      Not on file.  Mobile          [phone]    Patient Care Team:  Ko Matthew MD as PCP - General (Family Medicine)  Columba Florez MD as Obstetrician (Obstetrics)  Lili Mills MD as Consulting Physician (Obstetrics and Gynecology)  OB? No  What phone number can you be reached at? [phone]  Message:  Please link mammo orders to appt 12/02/22.           BGM

## 2024-04-27 NOTE — DIETITIAN INITIAL EVALUATION ADULT - NS FNS DIET ORDER
Diet, Pureed:   DASH/TLC {Sodium & Cholesterol Restricted} (DASH)  No Liquids (NOLIQUIDS) (04-26-24 @ 09:17)

## 2024-04-27 NOTE — DIETITIAN INITIAL EVALUATION ADULT - ORAL INTAKE PTA/DIET HISTORY
Pt sleeping soundly during visit. Pt had swallow evaluation; pt with dysphagia noted; diet advanced to Puree diet with no liquids.

## 2024-04-27 NOTE — DIETITIAN INITIAL EVALUATION ADULT - OTHER INFO
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  Per notes, was headed into work and his co-workers noted he suddenly stopped working and had a facial droop.  Initial /137. CTH showed LEFT thalamic IPH. Patient started on Cardene drip and sent to Neuro ICU.  CTA head and neck neg except for stenosis of the R P3 segment, but no vascular malformation or aneurysms.

## 2024-04-27 NOTE — CHART NOTE - NSCHARTNOTEFT_GEN_A_CORE
Writer was notified by RN that pt had 10 seconds of A flutter on tele   He self converted back to sinus rhythm  Patient was examined; family members at bedside.   Not noted for arrhythmia during that time.  EKG, Electrolytes ordered  Cardio consult in AM   Will continue to monitor for now

## 2024-04-28 DIAGNOSIS — R93.1 ABNORMAL FINDINGS ON DIAGNOSTIC IMAGING OF HEART AND CORONARY CIRCULATION: ICD-10-CM

## 2024-04-28 DIAGNOSIS — I63.9 CEREBRAL INFARCTION, UNSPECIFIED: ICD-10-CM

## 2024-04-28 LAB
ANION GAP SERPL CALC-SCNC: 11 MMOL/L — SIGNIFICANT CHANGE UP (ref 5–17)
BASOPHILS # BLD AUTO: 0.05 K/UL — SIGNIFICANT CHANGE UP (ref 0–0.2)
BASOPHILS NFR BLD AUTO: 0.6 % — SIGNIFICANT CHANGE UP (ref 0–2)
BUN SERPL-MCNC: 16.3 MG/DL — SIGNIFICANT CHANGE UP (ref 8–20)
CALCIUM SERPL-MCNC: 8.7 MG/DL — SIGNIFICANT CHANGE UP (ref 8.4–10.5)
CHLORIDE SERPL-SCNC: 102 MMOL/L — SIGNIFICANT CHANGE UP (ref 96–108)
CO2 SERPL-SCNC: 25 MMOL/L — SIGNIFICANT CHANGE UP (ref 22–29)
CREAT SERPL-MCNC: 0.83 MG/DL — SIGNIFICANT CHANGE UP (ref 0.5–1.3)
EGFR: 103 ML/MIN/1.73M2 — SIGNIFICANT CHANGE UP
EOSINOPHIL # BLD AUTO: 0.26 K/UL — SIGNIFICANT CHANGE UP (ref 0–0.5)
EOSINOPHIL NFR BLD AUTO: 3.1 % — SIGNIFICANT CHANGE UP (ref 0–6)
GLUCOSE BLDC GLUCOMTR-MCNC: 110 MG/DL — HIGH (ref 70–99)
GLUCOSE BLDC GLUCOMTR-MCNC: 97 MG/DL — SIGNIFICANT CHANGE UP (ref 70–99)
GLUCOSE BLDC GLUCOMTR-MCNC: 98 MG/DL — SIGNIFICANT CHANGE UP (ref 70–99)
GLUCOSE SERPL-MCNC: 100 MG/DL — HIGH (ref 70–99)
HCT VFR BLD CALC: 40.6 % — SIGNIFICANT CHANGE UP (ref 39–50)
HGB BLD-MCNC: 13.8 G/DL — SIGNIFICANT CHANGE UP (ref 13–17)
IMM GRANULOCYTES NFR BLD AUTO: 0.2 % — SIGNIFICANT CHANGE UP (ref 0–0.9)
LYMPHOCYTES # BLD AUTO: 2.02 K/UL — SIGNIFICANT CHANGE UP (ref 1–3.3)
LYMPHOCYTES # BLD AUTO: 24.2 % — SIGNIFICANT CHANGE UP (ref 13–44)
MAGNESIUM SERPL-MCNC: 2.1 MG/DL — SIGNIFICANT CHANGE UP (ref 1.6–2.6)
MCHC RBC-ENTMCNC: 30.1 PG — SIGNIFICANT CHANGE UP (ref 27–34)
MCHC RBC-ENTMCNC: 34 GM/DL — SIGNIFICANT CHANGE UP (ref 32–36)
MCV RBC AUTO: 88.5 FL — SIGNIFICANT CHANGE UP (ref 80–100)
MONOCYTES # BLD AUTO: 1.02 K/UL — HIGH (ref 0–0.9)
MONOCYTES NFR BLD AUTO: 12.2 % — SIGNIFICANT CHANGE UP (ref 2–14)
NEUTROPHILS # BLD AUTO: 4.96 K/UL — SIGNIFICANT CHANGE UP (ref 1.8–7.4)
NEUTROPHILS NFR BLD AUTO: 59.7 % — SIGNIFICANT CHANGE UP (ref 43–77)
PHOSPHATE SERPL-MCNC: 3.7 MG/DL — SIGNIFICANT CHANGE UP (ref 2.4–4.7)
PLATELET # BLD AUTO: 165 K/UL — SIGNIFICANT CHANGE UP (ref 150–400)
POTASSIUM SERPL-MCNC: 4 MMOL/L — SIGNIFICANT CHANGE UP (ref 3.5–5.3)
POTASSIUM SERPL-SCNC: 4 MMOL/L — SIGNIFICANT CHANGE UP (ref 3.5–5.3)
RBC # BLD: 4.59 M/UL — SIGNIFICANT CHANGE UP (ref 4.2–5.8)
RBC # FLD: 14.2 % — SIGNIFICANT CHANGE UP (ref 10.3–14.5)
SODIUM SERPL-SCNC: 138 MMOL/L — SIGNIFICANT CHANGE UP (ref 135–145)
WBC # BLD: 8.33 K/UL — SIGNIFICANT CHANGE UP (ref 3.8–10.5)
WBC # FLD AUTO: 8.33 K/UL — SIGNIFICANT CHANGE UP (ref 3.8–10.5)

## 2024-04-28 PROCEDURE — 99223 1ST HOSP IP/OBS HIGH 75: CPT

## 2024-04-28 PROCEDURE — 99233 SBSQ HOSP IP/OBS HIGH 50: CPT

## 2024-04-28 PROCEDURE — 93970 EXTREMITY STUDY: CPT | Mod: 26

## 2024-04-28 RX ORDER — AMLODIPINE BESYLATE 2.5 MG/1
7.5 TABLET ORAL DAILY
Refills: 0 | Status: DISCONTINUED | OUTPATIENT
Start: 2024-04-29 | End: 2024-05-01

## 2024-04-28 RX ORDER — AMLODIPINE BESYLATE 2.5 MG/1
2.5 TABLET ORAL ONCE
Refills: 0 | Status: COMPLETED | OUTPATIENT
Start: 2024-04-28 | End: 2024-04-28

## 2024-04-28 RX ADMIN — POLYETHYLENE GLYCOL 3350 17 GRAM(S): 17 POWDER, FOR SOLUTION ORAL at 11:29

## 2024-04-28 RX ADMIN — CHLORHEXIDINE GLUCONATE 1 APPLICATION(S): 213 SOLUTION TOPICAL at 05:57

## 2024-04-28 RX ADMIN — AMLODIPINE BESYLATE 2.5 MILLIGRAM(S): 2.5 TABLET ORAL at 17:01

## 2024-04-28 RX ADMIN — AMLODIPINE BESYLATE 5 MILLIGRAM(S): 2.5 TABLET ORAL at 05:57

## 2024-04-28 RX ADMIN — ENOXAPARIN SODIUM 40 MILLIGRAM(S): 100 INJECTION SUBCUTANEOUS at 22:22

## 2024-04-28 RX ADMIN — SENNA PLUS 2 TABLET(S): 8.6 TABLET ORAL at 22:22

## 2024-04-28 NOTE — PROGRESS NOTE ADULT - ASSESSMENT
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  Per notes, was headed into work and his co-workers noted he suddenly stopped working and had a facial droop.  Initial /137. CTH showed LEFT thalamic IPH. Patient started on Cardene drip and sent to Neuro ICU.  CTA head and neck neg except for stenosis of the R P3 segment, but no vascular malformation or aneurysms were appreciated.     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  Atrial flutter and mitral valve mobile structure being further evaluated- rule out vegetation     NEURO:   -Neurologically without acute change   -Continue close monitoring for neurologic deterioration    - Stroke neuro checks q 2hr    - SBP goal 120-140mMHg for now, gradually titrated avoiding rapid fluctuations and hypotension; over BP< 160/90mmHg    -ANTITHROMBOTIC THERAPY: unless cardiac indication found, no acute neurological indication at this time.  If indicated will need to have ongoing evaluation for timeline as at this time risk of hemorrhage is increased.   -titrate statin based on outpatient ASCVD risk stratification, no cerebral ischemia at this time.  LDL:  96  -MRI Brain w/wo pending , suggest repeat in 4-6 weeks upon resolution of hemorrhage. If valve vegetation present then consideration in selective cerebral angiogram.   -Dysphagia screen: pass, advance diet as tolerated per protocol   -Physical therapy/OT/Speech eval/treatment.       CARDIOVASCULAR:   - TTE  , EF 55-60% with severely dilated left atrium,  LVH, severely dilated LA, mobile mitral valve posterior leaflet possibly ruptured chordae but can not exclude vegetation, cardiac monitoring w/ telemetry for now, further evaluation pending findings of noted workup  -ANTONIO for further differentiation if amenable                               HEMATOLOGY:  -H/H without acute change, Platelets 165, patient should have all age and risk appropriate malignancy screenings with PCP or sooner if clinically suspected   -DVT ppx: Heparin s.c [] LMWH [x]     PULMONARY:   -On room air, protecting airway, saturating well     RENAL:   -BUN/Cr within range, monitor urine output, maintain adequate hydration    -Na Goal:  135-145  -Gallegos: N     ID:   -afebrile, no leukocytosis, monitor for si/sx of infection , blood cx x 2 in setting of r/o vegetation.     OTHER:    -condition and plan of care d/w patient and son at bedside, questions and concerns addressed.     DISPOSITION:   -AR once stable and workup is complete      CORE MEASURES:        Admission NIHSS:      Tenecteplase : [] YES [x] NO      LDL/HDL/A1C: 96/48/5.8     Depression Screen- if depression hx and/or present      Statin Therapy: as noted      Dysphagia Screen: [] PASS [] FAIL     Smoking [] YES [] NO      Afib [] YES [x] NO     Stroke Education [] YES [] NO - p    Obtain screening lower extremity venous ultrasound in patients who meet 1 or more of the following criteria as patient is high risk for DVT/PE on admission:   [] History of DVT/PE  []Hypercoagulable states (Factor V Leiden, Cancer, OCP, etc. )  []Prolonged immobility (hemiplegia/hemiparesis/post operative or any other extended immobilization)  [] Transferred from outside facility (Rehab or Long term care)  [] Age </= to 50

## 2024-04-28 NOTE — PROGRESS NOTE ADULT - SUBJECTIVE AND OBJECTIVE BOX
Preliminary note, official recommendations pending attending review/signature   Seen and examined by Stroke team attending/team, assessment/ plan as discussed with stroke team attending/team as noted.   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  Per notes, was heading into work and his co-workers noted he suddenly stopped working and had a facial droop.  Initial /137. CTH showed LEFT thalamic IPH. Patient started on Cardene drip and sent to Neuro ICU. Now on stroke service for further workup and evaluation.      SUBJECTIVE: No events overnight.  No new neurologic complaints.  ROS reported negative unless otherwise noted.    amLODIPine   Tablet 2.5 milliGRAM(s) Oral once  chlorhexidine 2% Cloths 1 Application(s) Topical daily  dextrose 10% Bolus 125 milliLiter(s) IV Bolus once  dextrose 5%. 1000 milliLiter(s) IV Continuous <Continuous>  dextrose 5%. 1000 milliLiter(s) IV Continuous <Continuous>  dextrose 50% Injectable 25 Gram(s) IV Push once  dextrose 50% Injectable 12.5 Gram(s) IV Push once  dextrose Oral Gel 15 Gram(s) Oral once PRN  enoxaparin Injectable 40 milliGRAM(s) SubCutaneous <User Schedule>  glucagon  Injectable 1 milliGRAM(s) IntraMuscular once  hydrALAZINE Injectable 10 milliGRAM(s) IV Push every 2 hours PRN  insulin lispro (ADMELOG) corrective regimen sliding scale   SubCutaneous three times a day before meals  labetalol Injectable 10 milliGRAM(s) IV Push every 2 hours PRN  polyethylene glycol 3350 17 Gram(s) Oral daily  senna 2 Tablet(s) Oral at bedtime  sodium chloride 0.9%. 1000 milliLiter(s) IV Continuous <Continuous>      PHYSICAL EXAM:   Vital Signs Last 24 Hrs  T(C): 36.7 (28 Apr 2024 16:00), Max: 36.9 (28 Apr 2024 08:00)  T(F): 98.1 (28 Apr 2024 16:00), Max: 98.4 (28 Apr 2024 08:00)  HR: 77 (28 Apr 2024 16:29) (62 - 81)  BP: 140/100 (28 Apr 2024 16:29) (118/80 - 160/99)  BP(mean): 121 (28 Apr 2024 16:05) (86 - 121)  RR: 20 (28 Apr 2024 16:00) (16 - 22)  SpO2: 98% (28 Apr 2024 16:00) (97% - 100%)    Parameters below as of 28 Apr 2024 14:00  Patient On (Oxygen Delivery Method): room air    General: No acute distress.   HEENT: right eye opacified, Head normocephalic, atraumatic. Conjunctivae clear w/o exudates or hemorrhage.  Respiratory: Chest wall symmetric, nontender. no use of accessory muscles, no audible wheezes or rhonchi  Abdominal: Soft, nondistended, nontender. + BDS/4 quad.   Skin: Skin is warm, dry and intact without rashes or lesions.   Extremities: No edema.     Detailed Neurologic Exam:    Mental status: The patient is awake in bed, oriented to self and place, following simple commands.     Cranial nerves: Pupils equal and react symmetrically to light. R eye blind and opacified. Extraocular motion is full with no nystagmus. There is no ptosis. R facial palsy , Palate elevates symmetrically. Tongue is midline.    Motor: There is normal bulk and tone.  There is no tremor.  Strength is +3/5 in the right arm, 5-/5 in right leg    Strength is 5/5 in the left arm and leg.    Sensation: Intact to light touch in 4 extremities, no extinction     Cerebellar: Cannot test on right    Gait : deferred    LABS:                        13.8   8.33  )-----------( 165      ( 28 Apr 2024 05:55 )             40.6    04-28    138  |  102  |  16.3  ----------------------------<  100<H>  4.0   |  25.0  |  0.83    Ca    8.7      28 Apr 2024 05:55  Phos  3.7     04-28  Mg     2.1     04-28 04-25 Chol 156 LDL -96- HDL 48 Trig 59    A1C: 5.6    ECHO:  TTE W or WO Ultrasound Enhancing Agent (04.26.24 @ 12:43)   CONCLUSIONS:      1. Left ventricular cavity is normal in size. Left ventricular systolic function is normal with an ejection fraction visually estimated at 55 to 60 %.   2. Severe asymmetric left ventricular hypertrophy.   3. Normal right ventricular cavity size and normal systolic function.   4. The left atrium is severely dilated.   5. The right atrium is normal in size.   6. Thickened mitral valve leaflets.   7. Mobile structure noted on the posterior mitral valve leaflet may represent ruptured chordae. Cannot rule out vegetation.   8. Severe mitral regurgitation.   9. Trileaflet aortic valve with normal systolic excursion.  10. Mild aortic regurgitation.  11. Mild pulmonic regurgitation.  12. No pericardial effusion seen.  13. Estimated pulmonary artery systolic pressure is 59 mmHg, consistent with moderate-to-severe pulmonary hypertension.    IMAGING: Reviewed by me.   Neuro-Imaging     CT Head No Cont (04.26.24 @ 06:16)   IMPRESSION:  Left thalamocapsular hemorrhage is stable    CT Head No Cont (04.25.24 @ 10:31)   1.  Grossly stable left thalamic hemorrhage.  2.  Midline shift to the right of approximately 0.4 cm.  3.  Chronic ischemic changes.  4.  Redemonstrated partially calcified masslike density in the right   nasal passages/ethmoid air cells, grossly stable.      CT Angio Neck Stroke Protocol w/ IV Cont (04.25.24 @ 04:26)   CTA NECK:  No significant stenosis of the cervical carotid arteries based   on NASCET criteria. Patent cervical vertebral arteries. No evidence of   cervical carotid or vertebral artery dissection.    Polypoid cystic and partially calcified mass in the right nasal passage   with protrudes into the knee nasopharynx and involves the right ethmoid   air cells. Near complete opacification of the right frontal sinus, right   ethmoid air cells, and right sphenoid sinus. Direct endoscopic   visualization is recommended. Correlation with outside prior imaging   would be helpful.    Mild pulmonary interstitial lung edema and small bilateral pleural   effusions.    CTA HEAD:  No high-grade stenosis or occlusion of the major proximal   arterial branches, however, short segment severe stenosis/occlusion of   the right posterior cerebral artery proximal P3 branch. No evidence of an   intracranial arterial aneurysm or arteriovenous malformation on CTA,   specifically, no evidence of vascular malformation in the region of the   left thalamic hemorrhage. No evidence of active extravasation of contrast   within the left thalamic parenchymal hemorrhage.    US Duplex Venous;  US Duplex Venous Lower Ext Complete, Bilateral (04.28.24 @ 15:58)   No acute DVT of the lower extremities.                   Seen and examined by Stroke team attending/team, assessment/ plan as discussed with stroke team attending/team as noted.   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  Per notes, was heading into work and his co-workers noted he suddenly stopped working and had a facial droop.  Initial /137. CTH showed LEFT thalamic IPH. Patient started on Cardene drip and sent to Neuro ICU. Now on stroke service for further workup and evaluation.      SUBJECTIVE: No events overnight.  No new neurologic complaints.  ROS reported negative unless otherwise noted.    amLODIPine   Tablet 2.5 milliGRAM(s) Oral once  chlorhexidine 2% Cloths 1 Application(s) Topical daily  dextrose 10% Bolus 125 milliLiter(s) IV Bolus once  dextrose 5%. 1000 milliLiter(s) IV Continuous <Continuous>  dextrose 5%. 1000 milliLiter(s) IV Continuous <Continuous>  dextrose 50% Injectable 25 Gram(s) IV Push once  dextrose 50% Injectable 12.5 Gram(s) IV Push once  dextrose Oral Gel 15 Gram(s) Oral once PRN  enoxaparin Injectable 40 milliGRAM(s) SubCutaneous <User Schedule>  glucagon  Injectable 1 milliGRAM(s) IntraMuscular once  hydrALAZINE Injectable 10 milliGRAM(s) IV Push every 2 hours PRN  insulin lispro (ADMELOG) corrective regimen sliding scale   SubCutaneous three times a day before meals  labetalol Injectable 10 milliGRAM(s) IV Push every 2 hours PRN  polyethylene glycol 3350 17 Gram(s) Oral daily  senna 2 Tablet(s) Oral at bedtime  sodium chloride 0.9%. 1000 milliLiter(s) IV Continuous <Continuous>      PHYSICAL EXAM:   Vital Signs Last 24 Hrs  T(C): 36.7 (28 Apr 2024 16:00), Max: 36.9 (28 Apr 2024 08:00)  T(F): 98.1 (28 Apr 2024 16:00), Max: 98.4 (28 Apr 2024 08:00)  HR: 77 (28 Apr 2024 16:29) (62 - 81)  BP: 140/100 (28 Apr 2024 16:29) (118/80 - 160/99)  BP(mean): 121 (28 Apr 2024 16:05) (86 - 121)  RR: 20 (28 Apr 2024 16:00) (16 - 22)  SpO2: 98% (28 Apr 2024 16:00) (97% - 100%)    Parameters below as of 28 Apr 2024 14:00  Patient On (Oxygen Delivery Method): room air    General: No acute distress.   HEENT: right eye opacified, Head normocephalic, atraumatic. Conjunctivae clear w/o exudates or hemorrhage.  Respiratory: Chest wall symmetric, nontender. no use of accessory muscles, no audible wheezes or rhonchi  Abdominal: Soft, nondistended, nontender. + BDS/4 quad.   Skin: Skin is warm, dry and intact without rashes or lesions.   Extremities: No edema.     Detailed Neurologic Exam:    Mental status: The patient is awake in bed, oriented to self and place, following simple commands.     Cranial nerves: Pupils equal and react symmetrically to light. R eye blind and opacified. Extraocular motion is full with no nystagmus. There is no ptosis. R facial palsy , Palate elevates symmetrically. Tongue is midline.    Motor: There is normal bulk and tone.  There is no tremor.  Strength is +3/5 in the right arm, 5-/5 in right leg    Strength is 5/5 in the left arm and leg.    Sensation: Intact to light touch in 4 extremities, no extinction     Cerebellar: Cannot test on right    Gait : deferred    LABS:                        13.8   8.33  )-----------( 165      ( 28 Apr 2024 05:55 )             40.6    04-28    138  |  102  |  16.3  ----------------------------<  100<H>  4.0   |  25.0  |  0.83    Ca    8.7      28 Apr 2024 05:55  Phos  3.7     04-28  Mg     2.1     04-28 04-25 Chol 156 LDL -96- HDL 48 Trig 59    A1C: 5.6    ECHO:  TTE W or WO Ultrasound Enhancing Agent (04.26.24 @ 12:43)   CONCLUSIONS:      1. Left ventricular cavity is normal in size. Left ventricular systolic function is normal with an ejection fraction visually estimated at 55 to 60 %.   2. Severe asymmetric left ventricular hypertrophy.   3. Normal right ventricular cavity size and normal systolic function.   4. The left atrium is severely dilated.   5. The right atrium is normal in size.   6. Thickened mitral valve leaflets.   7. Mobile structure noted on the posterior mitral valve leaflet may represent ruptured chordae. Cannot rule out vegetation.   8. Severe mitral regurgitation.   9. Trileaflet aortic valve with normal systolic excursion.  10. Mild aortic regurgitation.  11. Mild pulmonic regurgitation.  12. No pericardial effusion seen.  13. Estimated pulmonary artery systolic pressure is 59 mmHg, consistent with moderate-to-severe pulmonary hypertension.    IMAGING: Reviewed by me.   Neuro-Imaging     CT Head No Cont (04.26.24 @ 06:16)   IMPRESSION:  Left thalamocapsular hemorrhage is stable    CT Head No Cont (04.25.24 @ 10:31)   1.  Grossly stable left thalamic hemorrhage.  2.  Midline shift to the right of approximately 0.4 cm.  3.  Chronic ischemic changes.  4.  Redemonstrated partially calcified masslike density in the right   nasal passages/ethmoid air cells, grossly stable.      CT Angio Neck Stroke Protocol w/ IV Cont (04.25.24 @ 04:26)   CTA NECK:  No significant stenosis of the cervical carotid arteries based   on NASCET criteria. Patent cervical vertebral arteries. No evidence of   cervical carotid or vertebral artery dissection.    Polypoid cystic and partially calcified mass in the right nasal passage   with protrudes into the knee nasopharynx and involves the right ethmoid   air cells. Near complete opacification of the right frontal sinus, right   ethmoid air cells, and right sphenoid sinus. Direct endoscopic   visualization is recommended. Correlation with outside prior imaging   would be helpful.    Mild pulmonary interstitial lung edema and small bilateral pleural   effusions.    CTA HEAD:  No high-grade stenosis or occlusion of the major proximal   arterial branches, however, short segment severe stenosis/occlusion of   the right posterior cerebral artery proximal P3 branch. No evidence of an   intracranial arterial aneurysm or arteriovenous malformation on CTA,   specifically, no evidence of vascular malformation in the region of the   left thalamic hemorrhage. No evidence of active extravasation of contrast   within the left thalamic parenchymal hemorrhage.    US Duplex Venous;  US Duplex Venous Lower Ext Complete, Bilateral (04.28.24 @ 15:58)   No acute DVT of the lower extremities.

## 2024-04-28 NOTE — CONSULT NOTE ADULT - SUBJECTIVE AND OBJECTIVE BOX
[facility]                                              CARDIOLOGY AT [facility]                                                   [address] [address]                                             Telephone: [phone]. 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 Patient LKW was about 0200 when he was headed into work this am. 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 EMS called. Patient came in as code stroke. Per son patient takes asa daily for his heart and is unsure of any other medications or medical history. Son reports patient had been complaining of HA for about 2-3weeks. In the ED /137. CTH showed LEFT thalamic IPH. NIHSS at time of examination was 17. Patient started on Cardene drip. MRs 0. ICH score 2.     1A: Level of consciousness       +1= Arouses to minor stimulation      1B: Ask month and age        +2= Aphasic    1C: "Blink eyes" and "Squeeze Hands"       +2= Performs 0 tasks    2: Horizontal EOMs       0= Normal       3: Visual fields       0= No visual loss    4: Facial palsy (use grimace if obtunded)        +2= Partial paralysis ( lower face)      5A: Left arm motor drift (count out loud and use fingers to show count)       0= No drift x 10 seconds    5B: Right arm motor drift       +2= Some effort against gravity    6A: Left leg motor drift       0= No drift x 10 seconds    6B: Right leg motor drift       +2= Some effort against gravity         7: Limb ataxia (FNF/heel-shin)       0= Does not understand        8: Sensation       +1= mild-moderate loss: less sharp/more dull    9: Language/aphasia- describe the scene (on mechelle); name the items; read the sentences (on mechelle)       +2= Severe aphasia: fragmentary expression, inference needed, cannot identify materials    10: Dysarthria- read the words       +2= Severe dysarthria: unintelligible slurring or out of proportion to dysphasia        11: Extinction/inattention        +1= Extinction to b/l simultaneous stimulation      NIHHS= 17 (25 Apr 2024 05:12)      CARDIAC TESTING   ECHO:    STRESS:    CATH:     ELECTROPHYSIOLOGY:     PAST MEDICAL HISTORY  Traumatic blindness        PAST SURGICAL HISTORY      SOCIAL HISTORY:  Denies smoking/alcohol/drugs  CIGARETTES:     ALCOHOL:  DRUGS:    FAMILY HISTORY:  FH: diabetes mellitus (Mother, Sibling, Grandparent)      Family History of Cardiovascular Disease:  Yes [  ] No [  ]  Coronary Artery Disease in first degree relative: Yes [  ] No [  ]  Sudden Cardiac Death in First degree relative: Yes [  ] No [  ]    HOME MEDICATIONS:  aspirin 81 mg oral tablet: 1 tab(s) orally once a day (25 Apr 2024 06:31)      CURRENT CARDIAC MEDICATIONS:  amLODIPine   Tablet 5 milliGRAM(s) Oral daily  hydrALAZINE Injectable 10 milliGRAM(s) IV Push every 2 hours PRN SBP> 160  labetalol Injectable 10 milliGRAM(s) IV Push every 2 hours PRN SBP> 160      CURRENT OTHER MEDICATIONS:  polyethylene glycol 3350 17 Gram(s) Oral daily  senna 2 Tablet(s) Oral at bedtime  chlorhexidine 2% Cloths 1 Application(s) Topical daily  dextrose 10% Bolus 125 milliLiter(s) IV Bolus once, Stop order after: 1 Doses  dextrose 5%. 1000 milliLiter(s) (100 mL/Hr) IV Continuous <Continuous>  dextrose 5%. 1000 milliLiter(s) (50 mL/Hr) IV Continuous <Continuous>  dextrose 50% Injectable 25 Gram(s) IV Push once, Stop order after: 1 Doses  dextrose 50% Injectable 12.5 Gram(s) IV Push once, Stop order after: 1 Doses  dextrose Oral Gel 15 Gram(s) Oral once, Stop order after: 1 Doses PRN Blood Glucose LESS THAN 70 milliGRAM(s)/deciliter  enoxaparin Injectable 40 milliGRAM(s) SubCutaneous <User Schedule>  glucagon  Injectable 1 milliGRAM(s) IntraMuscular once, Stop order after: 1 Doses  insulin lispro (ADMELOG) corrective regimen sliding scale   SubCutaneous three times a day before meals  sodium chloride 0.9%. 1000 milliLiter(s) (60 mL/Hr) IV Continuous <Continuous>      ALLERGIES:   No Known Allergies      REVIEW OF SYMPTOMS:   CONSTITUTIONAL: No fever, no chills, no weight loss, no weight gain, no fatigue   ENMT:  No vertigo; No sinus or throat pain  NECK: No pain or stiffness  CARDIOVASCULAR: No chest pain, no dyspnea, no syncope/presyncope, no palpitations, no dizziness, no Orthopnea, no Paroxsymal nocturnal dyspnea  RESPIRATORY: no Shortness of breath, no cough, no wheezing  : No dysuria, no hematuria   GI: No dark color stool, no nausea, no diarrhea, no constipation, no abdominal pain   NEURO: No headache, no slurred speech   MUSCULOSKELETAL: No joint pain or swelling; No muscle, back, or extremity pain  PSYCH: No agitation, no anxiety.    ALL OTHER REVIEW OF SYSTEMS ARE NEGATIVE.    VITAL SIGNS:  T(C): 36.9 (04-28-24 @ 08:00), Max: 36.9 (04-28-24 @ 08:00)  T(F): 98.4 (04-28-24 @ 08:00), Max: 98.4 (04-28-24 @ 08:00)  HR: 79 (04-28-24 @ 14:00) (62 - 81)  BP: 123/93 (04-28-24 @ 14:00) (118/80 - 160/99)  RR: 18 (04-28-24 @ 14:00) (15 - 22)  SpO2: 99% (04-28-24 @ 14:00) (97% - 100%)    INTAKE AND OUTPUT:     04-27 @ 07:01  -  04-28 @ 07:00  --------------------------------------------------------  IN: 480 mL / OUT: 300 mL / NET: 180 mL        PHYSICAL EXAM:  Constitutional: Comfortable . No acute distress.   HEENT: Atraumatic and normocephalic , neck is supple . no JVD. No carotid bruit.  CNS: A&Ox3. No focal deficits.   Respiratory: CTAB, unlabored   Cardiovascular: RRR normal s1 s2. No murmur. No rubs or gallop.  Gastrointestinal: Soft, non-tender. +Bowel sounds.   Extremities: 2+ Peripheral Pulses, No clubbing, cyanosis, or edema  Psychiatric: Calm . no agitation.   Skin: Warm and dry, no ulcers on extremities     LABS:                            13.8   8.33  )-----------( 165      ( 28 Apr 2024 05:55 )             40.6     04-28    138  |  102  |  16.3  ----------------------------<  100<H>  4.0   |  25.0  |  0.83    Ca    8.7      28 Apr 2024 05:55  Phos  3.7     04-28  Mg     2.1     04-28        Urinalysis Basic - ( 28 Apr 2024 05:55 )    Color: x / Appearance: x / SG: x / pH: x  Gluc: 100 mg/dL / Ketone: x  / Bili: x / Urobili: x   Blood: x / Protein: x / Nitrite: x   Leuk Esterase: x / RBC: x / WBC x   Sq Epi: x / Non Sq Epi: x / Bacteria: x        INTERPRETATION OF TELEMETRY: SR, PACs, artifact (resembles flutter waves 2 of     ECG:   Prior ECG: Yes [  ] No [  ]    RADIOLOGY & ADDITIONAL STUDIES:    X-ray:    CT scan:   MRI:   US:                                                [facility]                                              CARDIOLOGY AT [address], [place] [address]                                             Telephone: [phone]. 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 code stroke. In the ED /137. CTH showed LEFT thalamic IPH. Patient started on Cardene drip.  Cardiology called for evaluation ich, possible aflutter on telemetry, and abnormal Echo- possible ruptured cordae. Pt confused, alter to self. Son at bedside, states he is improving daily.        CARDIAC TESTING   ECHO:  < from: TTE W or WO Ultrasound Enhancing Agent (04.26.24 @ 12:43) >  _______________________________________________________________________________________     CONCLUSIONS:      1. Left ventricular cavity is normal in size. Left ventricular systolic function is normal with an ejection fraction visually estimated at 55 to 60 %.   2. Severe asymmetric left ventricular hypertrophy.   3. Normal right ventricular cavity size and normal systolic function.   4. The left atrium is severely dilated.   5. The right atrium is normal in size.   6. Thickened mitral valve leaflets.   7. Mobile structure noted on the posterior mitral valve leaflet may represent ruptured chordae. Cannot rule out vegetation.   8. Severe mitral regurgitation.   9. Trileaflet aortic valve with normal systolic excursion.  10. Mild aortic regurgitation.  11. Mild pulmonic regurgitation.  12. No pericardial effusion seen.  13. Estimated pulmonary artery systolic pressure is 59 mmHg, consistent with moderate-to-severe pulmonary hypertension.      Mitral Valve:  Structurally normal mitral valve with normal leaflet excursion. Mobile structure noted on the posterior mitral  valve leaflet may represent ruptured chordae. Cannot rule out vegetation. Thickened mitral valve leaflets.  The mitral valve peak gradient is 7.7 mmHg and a mean gradient is 2.08 mmHg at a heart rate of 75 bpm.  The calculated mitral valve area is 1.7 cm². The mitral valve DT is 170 msec. The calculated mitral valve  area is 4.4 cm². There is severe mitral regurgitation. The mitral regurgitant jet is centrally directed.  PAST MEDICAL HISTORY  Traumatic blindness      ________________________________________________________________________________________    < end of copied text >      PAST SURGICAL HISTORY      SOCIAL HISTORY:  Denies smoking/alcohol/drugs        FAMILY HISTORY:  FH: diabetes mellitus (Mother, Sibling, Grandparent)      Family History of Cardiovascular Disease:  Yes [  ] No [  ]  Coronary Artery Disease in first degree relative: Yes [  ] No [  ]  Sudden Cardiac Death in First degree relative: Yes [  ] No [  ]    HOME MEDICATIONS:  aspirin 81 mg oral tablet: 1 tab(s) orally once a day (25 Apr 2024 06:31)      CURRENT CARDIAC MEDICATIONS:  amLODIPine   Tablet 5 milliGRAM(s) Oral daily  hydrALAZINE Injectable 10 milliGRAM(s) IV Push every 2 hours PRN SBP> 160  labetalol Injectable 10 milliGRAM(s) IV Push every 2 hours PRN SBP> 160      CURRENT OTHER MEDICATIONS:  polyethylene glycol 3350 17 Gram(s) Oral daily  senna 2 Tablet(s) Oral at bedtime  chlorhexidine 2% Cloths 1 Application(s) Topical daily  dextrose 10% Bolus 125 milliLiter(s) IV Bolus once, Stop order after: 1 Doses  dextrose 5%. 1000 milliLiter(s) (100 mL/Hr) IV Continuous <Continuous>  dextrose 5%. 1000 milliLiter(s) (50 mL/Hr) IV Continuous <Continuous>  dextrose 50% Injectable 25 Gram(s) IV Push once, Stop order after: 1 Doses  dextrose 50% Injectable 12.5 Gram(s) IV Push once, Stop order after: 1 Doses  dextrose Oral Gel 15 Gram(s) Oral once, Stop order after: 1 Doses PRN Blood Glucose LESS THAN 70 milliGRAM(s)/deciliter  enoxaparin Injectable 40 milliGRAM(s) SubCutaneous <User Schedule>  glucagon  Injectable 1 milliGRAM(s) IntraMuscular once, Stop order after: 1 Doses  insulin lispro (ADMELOG) corrective regimen sliding scale   SubCutaneous three times a day before meals  sodium chloride 0.9%. 1000 milliLiter(s) (60 mL/Hr) IV Continuous <Continuous>      ALLERGIES:   No Known Allergies      REVIEW OF SYMPTOMS:   CONSTITUTIONAL: No fever, no chills, no weight loss, no weight gain, no fatigue   ENMT:  No vertigo; No sinus or throat pain  NECK: No pain or stiffness  CARDIOVASCULAR: No chest pain, no dyspnea, no syncope/presyncope, no palpitations, no dizziness, no Orthopnea, no Paroxsymal nocturnal dyspnea  RESPIRATORY: no Shortness of breath, no cough, no wheezing  : No dysuria, no hematuria   GI: No dark color stool, no nausea, no diarrhea, no constipation, no abdominal pain   NEURO: No headache, no slurred speech   MUSCULOSKELETAL: No joint pain or swelling; No muscle, back, or extremity pain  PSYCH: No agitation, no anxiety.    ALL OTHER REVIEW OF SYSTEMS ARE NEGATIVE.    VITAL SIGNS:  T(C): 36.9 (04-28-24 @ 08:00), Max: 36.9 (04-28-24 @ 08:00)  T(F): 98.4 (04-28-24 @ 08:00), Max: 98.4 (04-28-24 @ 08:00)  HR: 79 (04-28-24 @ 14:00) (62 - 81)  BP: 123/93 (04-28-24 @ 14:00) (118/80 - 160/99)  RR: 18 (04-28-24 @ 14:00) (15 - 22)  SpO2: 99% (04-28-24 @ 14:00) (97% - 100%)    INTAKE AND OUTPUT:     04-27 @ 07:01  -  04-28 @ 07:00  --------------------------------------------------------  IN: 480 mL / OUT: 300 mL / NET: 180 mL        PHYSICAL EXAM:  Constitutional: Comfortable . No acute distress.   HEENT: Atraumatic and normocephalic , neck is supple . no JVD. No carotid bruit.  CNS: A&Ox3. No focal deficits.   Respiratory: CTAB, unlabored   Cardiovascular: RRR normal s1 s2. No murmur. No rubs or gallop.  Gastrointestinal: Soft, non-tender. +Bowel sounds.   Extremities: 2+ Peripheral Pulses, No clubbing, cyanosis, or edema  Psychiatric: Calm . no agitation.   Skin: Warm and dry, no ulcers on extremities     LABS:                            13.8   8.33  )-----------( 165      ( 28 Apr 2024 05:55 )             40.6     04-28    138  |  102  |  16.3  ----------------------------<  100<H>  4.0   |  25.0  |  0.83    Ca    8.7      28 Apr 2024 05:55  Phos  3.7     04-28  Mg     2.1     04-28        Urinalysis Basic - ( 28 Apr 2024 05:55 )    Color: x / Appearance: x / SG: x / pH: x  Gluc: 100 mg/dL / Ketone: x  / Bili: x / Urobili: x   Blood: x / Protein: x / Nitrite: x   Leuk Esterase: x / RBC: x / WBC x   Sq Epi: x / Non Sq Epi: x / Bacteria: x        INTERPRETATION OF TELEMETRY: SR, PACs, artifact (resembles flutter waves 2 of     ECG:   Prior ECG: Yes [  ] No [  ]    RADIOLOGY & ADDITIONAL STUDIES:    X-ray:    CT scan:   < from: CT Head No Cont (04.26.24 @ 06:16) >  COMPARISON:  Compared to study dated 4/25/2024    FINDINGS:  HEMORRHAGE/RAIN PARENCHYMA: Stable acute parenchymal hemorrhage involving   the left thalamus and adjacent internal capsule measuring 3.3 x 2.4 x 3.6   cm. Mild facet stable edema. Regional mass effect. Stable   intraventricular extension. Mild underlying brain atrophy. Moderate   periventricular and subcortical white matter ischemic changes. Chronic   lacunar infarcts bilateral basal ganglia and right thalamus.  VENTRICLES / SHIFT:  Mild effacement of the third ventricle. No   hydrocephalus.  EXTRA-AXIAL / BASAL CISTERNS:  No extra-axial mass. Basal cisterns   preserved.  CALVARIUM AND EXTRACRANIAL SOFT TISSUES:  No depressed calvarial fracture.  SINUSES, ORBITS, MASTOIDS:  Right-sided nasal polyposis and associated   chronic sinusitis. Intraocular silicone within a small right globe.    IMPRESSION:  Left thalamocapsular hemorrhage is stable    < end of copied text >    MRI:   US:

## 2024-04-28 NOTE — CONSULT NOTE ADULT - NS ATTEND AMEND GEN_ALL_CORE FT
NSGY Attg:    see above    patient seen and examined    agree with above    imaging reviewed    plan of care determined for left ICH  no acute neurosurgical intervention  supportive care per ICU  MRI brain w and wo contrast pending
57 y/o M admitted with L thalamic IPH. Underwent TTE with LVEF 55-60%, severely dilated LA, mobile structure on posterior MV leaflet with severe MR. Check blood cultures. ANTONIO if family is agreeable.

## 2024-04-28 NOTE — CHART NOTE - NSCHARTNOTEFT_GEN_A_CORE
Called by RN for change/discrepancy in NIH score.   RN reports Ataxia and inability to correctly answer month or age.  Patient seen and examined, in NAD Called by RN for change/discrepancy in documented NIH score.   RN reports Ataxia and inability to correctly answer month or age.  Patient seen and examined, in NAD Called by RN for change/discrepancy in documented NIH score.   RN reports Ataxia and inability to correctly answer month or age which not reflected in last documented NIH score.  Patient seen and examined, in NAD. Patient is alert to self only (name, ), states he is 26 years old, the year is , and the month is March or April.   As per RN and chart, patient is confused at baseline but to varying degrees.   FROM x4, R sided facial droop, follows commands, some dysarthria as per RN who provided [place] interpretation.   CT head ordered.  Continue to monitor patient, notify PA of any changes in patient status.

## 2024-04-28 NOTE — CONSULT NOTE ADULT - ASSESSMENT
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 code stroke. In the ED /137. CTH showed LEFT thalamic IPH. Patient started on Cardene drip.  Cardiology called for evaluation ich, possible aflutter on telemetry, and abnormal Echo- possible ruptured cordae. Pt confused, alter to self. Son at bedside, states he is improving daily.

## 2024-04-28 NOTE — CONSULT NOTE ADULT - PROBLEM SELECTOR RECOMMENDATION 9
.  -  Mobile structure noted on the posterior mitral valve leaflet may represent ruptured chordae. Cannot rule out vegetation. Severe mitral regurgitation.  - plan for ANTONIO tomorrow  - discussed plan with son, Michel Elliott {[phone]}, via .   - will need consent from son, risks benefits explained, son hesitant but agreeable, will ultimately decide tomorrow.   - keep patient NPO after MN, will add to schedule tomorrow  - telemetry reviewed, does not appear to be aflutter, likely artifact, cont to monitor. flutter waves not present in all three leads.

## 2024-04-29 ENCOUNTER — RESULT REVIEW (OUTPATIENT)
Age: 59
End: 2024-04-29

## 2024-04-29 LAB
ANION GAP SERPL CALC-SCNC: 9 MMOL/L — SIGNIFICANT CHANGE UP (ref 5–17)
BUN SERPL-MCNC: 11.6 MG/DL — SIGNIFICANT CHANGE UP (ref 8–20)
CALCIUM SERPL-MCNC: 9 MG/DL — SIGNIFICANT CHANGE UP (ref 8.4–10.5)
CHLORIDE SERPL-SCNC: 103 MMOL/L — SIGNIFICANT CHANGE UP (ref 96–108)
CO2 SERPL-SCNC: 26 MMOL/L — SIGNIFICANT CHANGE UP (ref 22–29)
CREAT SERPL-MCNC: 0.69 MG/DL — SIGNIFICANT CHANGE UP (ref 0.5–1.3)
EGFR: 109 ML/MIN/1.73M2 — SIGNIFICANT CHANGE UP
GLUCOSE BLDC GLUCOMTR-MCNC: 103 MG/DL — HIGH (ref 70–99)
GLUCOSE BLDC GLUCOMTR-MCNC: 132 MG/DL — HIGH (ref 70–99)
GLUCOSE SERPL-MCNC: 96 MG/DL — SIGNIFICANT CHANGE UP (ref 70–99)
HCT VFR BLD CALC: 42.2 % — SIGNIFICANT CHANGE UP (ref 39–50)
HGB BLD-MCNC: 14.7 G/DL — SIGNIFICANT CHANGE UP (ref 13–17)
MCHC RBC-ENTMCNC: 29.9 PG — SIGNIFICANT CHANGE UP (ref 27–34)
MCHC RBC-ENTMCNC: 34.8 GM/DL — SIGNIFICANT CHANGE UP (ref 32–36)
MCV RBC AUTO: 85.8 FL — SIGNIFICANT CHANGE UP (ref 80–100)
PLATELET # BLD AUTO: 179 K/UL — SIGNIFICANT CHANGE UP (ref 150–400)
POTASSIUM SERPL-MCNC: 3.5 MMOL/L — SIGNIFICANT CHANGE UP (ref 3.5–5.3)
POTASSIUM SERPL-SCNC: 3.5 MMOL/L — SIGNIFICANT CHANGE UP (ref 3.5–5.3)
RBC # BLD: 4.92 M/UL — SIGNIFICANT CHANGE UP (ref 4.2–5.8)
RBC # FLD: 13.8 % — SIGNIFICANT CHANGE UP (ref 10.3–14.5)
SODIUM SERPL-SCNC: 138 MMOL/L — SIGNIFICANT CHANGE UP (ref 135–145)
TSH SERPL-MCNC: 4.43 UIU/ML — HIGH (ref 0.27–4.2)
WBC # BLD: 7.9 K/UL — SIGNIFICANT CHANGE UP (ref 3.8–10.5)
WBC # FLD AUTO: 7.9 K/UL — SIGNIFICANT CHANGE UP (ref 3.8–10.5)

## 2024-04-29 PROCEDURE — 99233 SBSQ HOSP IP/OBS HIGH 50: CPT

## 2024-04-29 PROCEDURE — 93325 DOPPLER ECHO COLOR FLOW MAPG: CPT | Mod: 26

## 2024-04-29 PROCEDURE — 76376 3D RENDER W/INTRP POSTPROCES: CPT | Mod: 26

## 2024-04-29 PROCEDURE — 93320 DOPPLER ECHO COMPLETE: CPT | Mod: 26

## 2024-04-29 PROCEDURE — 93312 ECHO TRANSESOPHAGEAL: CPT | Mod: 26

## 2024-04-29 PROCEDURE — 99223 1ST HOSP IP/OBS HIGH 75: CPT | Mod: GC

## 2024-04-29 RX ADMIN — ENOXAPARIN SODIUM 40 MILLIGRAM(S): 100 INJECTION SUBCUTANEOUS at 21:08

## 2024-04-29 RX ADMIN — CHLORHEXIDINE GLUCONATE 1 APPLICATION(S): 213 SOLUTION TOPICAL at 06:00

## 2024-04-29 RX ADMIN — AMLODIPINE BESYLATE 7.5 MILLIGRAM(S): 2.5 TABLET ORAL at 05:50

## 2024-04-29 RX ADMIN — SODIUM CHLORIDE 60 MILLILITER(S): 9 INJECTION INTRAMUSCULAR; INTRAVENOUS; SUBCUTANEOUS at 05:56

## 2024-04-29 RX ADMIN — POLYETHYLENE GLYCOL 3350 17 GRAM(S): 17 POWDER, FOR SOLUTION ORAL at 17:18

## 2024-04-29 RX ADMIN — SENNA PLUS 2 TABLET(S): 8.6 TABLET ORAL at 21:08

## 2024-04-29 RX ADMIN — Medication 10 MILLIGRAM(S): at 10:46

## 2024-04-29 NOTE — PROGRESS NOTE ADULT - PROBLEM SELECTOR PLAN 2
.  - ICH  - Left thoracoscapular hemorrhage  - treatment as per primary team. .  - ICH  - Left thoracoscapular hemorrhage  - treatment as per primary team.        Thank you for allowing me to participate in care of your patient.   Please call as needed.

## 2024-04-29 NOTE — PROGRESS NOTE ADULT - SUBJECTIVE AND OBJECTIVE BOX
[facility]                                                         CARDIOLOGY AT [facility]3]                                                                  [address] [facility], [place]-[id number] [place] [address]                                                         Telephone: [phone]. Fax:[phone]                                                                             PROGRESS NOTE      Reason for follow up: ICH, MR  Last 24h Telemetry: SR, PVCs, PACs  Overall Plan: ANTONIO today to evaluate Mitral valve      Review of symptoms:   Cardiac:  No chest pain. No dyspnea. No palpitations.  Respiratory: no cough. No dyspnea  Gastrointestinal: No diarrhea. No abdominal pain. No bleeding.   Neuro: No focal neuro complaints.      Vitals:  T(C): 36.5 (04-29-24 @ 07:59), Max: 36.7 (04-28-24 @ 16:00)  HR: 64 (04-29-24 @ 08:00) (64 - 81)  BP: 125/95 (04-29-24 @ 08:00) (123/93 - 157/94)  RR: 20 (04-29-24 @ 08:00) (16 - 21)  SpO2: 90% (04-29-24 @ 08:00) (90% - 100%)        I&O's Summary    28 Apr 2024 07:01  -  29 Apr 2024 07:00  --------------------------------------------------------  IN: 1440 mL / OUT: 3700 mL / NET: -2260 mL      Weight (kg): 92.3 (04-25 @ 05:55)      PHYSICAL EXAM:  Appearance: Comfortable. No acute distress  HEENT:  Atraumatic. Normocephalic.  Normal oral mucosa  Neurologic: A & O x 3, no gross focal deficits.  Cardiovascular: RRR S1 S2, No murmur, no rubs/gallops. No JVD  Respiratory: Lungs clear to auscultation, unlabored   Gastrointestinal:  Soft, Non-tender, + BS  Lower Extremities: 2+ Peripheral Pulses, No clubbing, cyanosis, or edema  Psychiatry: Patient is calm. No agitation.   Skin: warm and dry.      CURRENT CARDIAC MEDICATIONS:  amLODIPine   Tablet 7.5 milliGRAM(s) Oral daily  hydrALAZINE Injectable 10 milliGRAM(s) IV Push every 2 hours PRN  labetalol Injectable 10 milliGRAM(s) IV Push every 2 hours PRN        CURRENT OTHER MEDICATIONS:  polyethylene glycol 3350 17 Gram(s) Oral daily  senna 2 Tablet(s) Oral at bedtime  glucagon  Injectable 1 milliGRAM(s) IntraMuscular once, Stop order after: 1 Doses  insulin lispro (ADMELOG) corrective regimen sliding scale   SubCutaneous three times a day before meals  chlorhexidine 2% Cloths 1 Application(s) Topical daily  enoxaparin Injectable 40 milliGRAM(s) SubCutaneous <User Schedule>  sodium chloride 0.9%. 1000 milliLiter(s) (60 mL/Hr) IV Continuous <Continuous>        LABS:	 	                        14.7   7.90  )-----------( 179      ( 29 Apr 2024 06:39 )             42.2     04-29    138  |  103  |  11.6  ----------------------------<  96  3.5   |  26.0  |  0.69    Ca    9.0      29 Apr 2024 06:39  Phos  3.7     04-28  Mg     2.1     04-28      PT/INR/PTT ( 25 Apr 2024 04:10 )                       :                       :      11.4         :       29.4                  .        .                   .              .           .       1.03        .                                       Lipid Profile: Date: 04-25 @ 07:49  Total cholesterol 156; Direct LDL: --; HDL: 48; Triglycerides:59      TSH: Thyroid Stimulating Hormone, Serum: 4.43 uIU/mL  Thyroid Stimulating Hormone, Serum: 1.39 uIU/mL      TELEMETRY: SR, PACs, PVCs      DIAGNOSTIC TESTING:  [ ] Echocardiogram:   < from: TTE W or WO Ultrasound Enhancing Agent (04.26.24 @ 12:43) >  _______________________________________________________________________________________     CONCLUSIONS:      1. Left ventricular cavity is normal in size. Left ventricular systolic function is normal with an ejection fraction visually estimated at 55 to 60 %.   2. Severe asymmetric left ventricular hypertrophy.   3. Normal right ventricular cavity size and normal systolic function.   4. The left atrium is severely dilated.   5. The right atrium is normal in size.   6. Thickened mitral valve leaflets.   7. Mobile structure noted on the posterior mitral valve leaflet may represent ruptured chordae. Cannot rule out vegetation.   8. Severe mitral regurgitation.   9. Trileaflet aortic valve with normal systolic excursion.  10. Mild aortic regurgitation.  11. Mild pulmonic regurgitation.  12. No pericardial effusion seen.  13. Estimated pulmonary artery systolic pressure is 59 mmHg, consistent with moderate-to-severe pulmonary hypertension.    ________________________________________________________________________________________  FINDINGS:    < end of copied text >

## 2024-04-29 NOTE — CONSULT NOTE ADULT - SUBJECTIVE AND OBJECTIVE BOX
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 Patient LKW was about 0200 when he was headed into work this am. 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 EMS called. Patient came in as code stroke. Per fer review, Son said patient takes ASA daily for his heart and is unsure of any other medications or medical history. Son reports patient had been complaining of HA for about 2-3weeks. In the ED /137. CTH showed LEFT thalamic IPH. NSGY was consulted recommended admission to the ICU with repeat CTH. rCTH was stable on 4/26/24. PM&R was consulted for disposition recommendations:     BP at goal, afebrile   No hydralazine    Pertinent events:   4/25: Admitted to NSICU. CTA head/neck with severe stenosis/occlusion of right PCA proxmial P3 branch, no evidence of aneurysm or AVM. Cardene gtt started. Stroke consulted. SLP evaluated- recommended pureed diet with NO liquids. Weaned off cardene. Repeat CT head stable  4/26: Repeat CT head stable. Stable for downgrade to telemetry under stroke neurology        Imaging Reviewed Today:  CT Head No Cont (04.26.24 @ 06:16)   IMPRESSION:  Left thalamocapsular hemorrhage is stable       CT Head No Cont (04.25.24 @ 10:31)   1.  Grossly stable left thalamic hemorrhage.  2.  Midline shift to the right of approximately 0.4 cm.  3.  Chronic ischemic changes.  4.  Redemonstrated partially calcified masslike density in the right   nasal passages/ethmoid air cells, grossly stable.      CT Angio Neck Stroke Protocol w/ IV Cont (04.25.24 @ 04:26)   CTA NECK:  No significant stenosis of the cervical carotid arteries based   on NASCET criteria. Patent cervical vertebral arteries. No evidence of   cervical carotid or vertebral artery dissection.    Polypoid cystic and partially calcified mass in the right nasal passage   with protrudes into the knee nasopharynx and involves the right ethmoid   air cells. Near complete opacification of the right frontal sinus, right   ethmoid air cells, and right sphenoid sinus. Direct endoscopic   visualization is recommended. Correlation with outside prior imaging   would be helpful.    Mild pulmonary interstitial lung edema and small bilateral pleural   effusions.    CTA HEAD:  No high-grade stenosis or occlusion of the major proximal   arterial branches, however, short segment severe stenosis/occlusion of   the right posterior cerebral artery proximal P3 branch. No evidence of an   intracranial arterial aneurysm or arteriovenous malformation on CTA,   specifically, no evidence of vascular malformation in the region of the   left thalamic hemorrhage. No evidence of active extravasation of contrast   within the left thalamic parenchymal hemorrhage.        TTE W or WO Ultrasound Enhancing Agent (04.26.24 @ 12:43)   CONCLUSIONS:      1. Left ventricular cavity is normal in size. Left ventricular systolic function is normal with an ejection fraction visually estimated at 55 to 60 %.   2. Severe asymmetric left ventricular hypertrophy.   3. Normal right ventricular cavity size and normal systolic function.   4. The left atrium is severely dilated.   5. The right atrium is normal in size.   6. Thickened mitral valve leaflets.   7. Mobile structure noted on the posterior mitral valve leaflet may represent ruptured chordae. Cannot rule out vegetation.   8. Severe mitral regurgitation.   9. Trileaflet aortic valve with normal systolic excursion.  10. Mild aortic regurgitation.  11. Mild pulmonic regurgitation.  12. No pericardial effusion seen.  13. Estimated pulmonary artery systolic pressure is 59 mmHg, consistent with moderate-to-severe pulmonary hypertension.                ----------------------------    VITALS  T(C): 36.5 (04-29-24 @ 07:59), Max: 36.7 (04-28-24 @ 16:00)  HR: 64 (04-29-24 @ 08:00) (64 - 81)  BP: 125/95 (04-29-24 @ 08:00) (123/93 - 157/94)  RR: 20 (04-29-24 @ 08:00) (16 - 21)  SpO2: 90% (04-29-24 @ 08:00) (90% - 100%)  Wt(kg): --    PAST MEDICAL & SURGICAL HISTORY  Traumatic blindness    SOCIAL HISTORY - as per documentation/history  Smoking - None  EtOH - None  Drugs - None    FUNCTIONAL HISTORY  Lives   Independent    CURRENT FUNCTIONAL STATUS  4/27 PT   Bed Mobility  Bed Mobility Training Supine-to-Sit: minimum assist (75% patient effort);  moderate assist (50% patient effort);  1 person assist  Bed Mobility Training Limitations: impaired postural control;  decreased strength    Sit-Stand Transfer Training  Transfer Training Sit-to-Stand Transfer: minimum assist (75% patient effort);  1 person assist;  rolling walker  Transfer Training Stand-to-Sit Transfer: minimum assist (75% patient effort);  1 person assist;  rolling walker  Sit-to-Stand Transfer Training Transfer Safety Analysis: decreased strength;  impaired balance    Gait Training  Gait Training: moderate assist (50% patient effort);  1 person assist;  rolling walker;  8 feet  Gait Analysis: 3-point gait   right hip flexion limited in ambulation, dragging RLE;  decreased strength;  impaired balance;  impaired motor control    4/26 OT   Bathing Training:     · Level of [place]	maximum assist (25% patients effort)  · Physical Assist/Nonphysical Assist	1 person assist    Upper Body Dressing Training:     · Level of [place]	moderate assist (50% patients effort); to don gown  · Physical Assist/Nonphysical Assist	1 person assist    Lower Body Dressing Training:     · Level of [place]	maximum assist (25% patients effort); to don socks  · Physical Assist/Nonphysical Assist	1 person assist    Toilet Hygiene Training:     · Level of [place]	dependent (less than 25% patients effort); secondary to [place] catheter    Grooming Training:     · Level of [place]	moderate assist (50% patients effort)  · Physical Assist/Nonphysical Assist	1 person assist    Eating/Self-Feeding Training:     · Level of [place]	minimum assist (75% patients effort)  · Physical Assist/Nonphysical Assist	1 person assist      RECENT LABS/IMAGING  REVIEWED    CBC Full  -  ( 29 Apr 2024 06:39 )  WBC Count : 7.90 K/uL  RBC Count : 4.92 M/uL  Hemoglobin : 14.7 g/dL  Hematocrit : 42.2 %  Platelet Count - Automated : 179 K/uL  Mean Cell Volume : 85.8 fl  Mean Cell Hemoglobin : 29.9 pg  Mean Cell Hemoglobin Concentration : 34.8 gm/dL  Auto Neutrophil # : x  Auto Lymphocyte # : x  Auto Monocyte # : x  Auto Eosinophil # : x  Auto Basophil # : x  Auto Neutrophil % : x  Auto Lymphocyte % : x  Auto Monocyte % : x  Auto Eosinophil % : x  Auto Basophil % : x    04-29    138  |  103  |  11.6  ----------------------------<  96  3.5   |  26.0  |  0.69    Ca    9.0      29 Apr 2024 06:39  Phos  3.7     04-28  Mg     2.1     04-28      Urinalysis Basic - ( 29 Apr 2024 06:39 )    Color: x / Appearance: x / SG: x / pH: x  Gluc: 96 mg/dL / Ketone: x  / Bili: x / Urobili: x   Blood: x / Protein: x / Nitrite: x   Leuk Esterase: x / RBC: x / WBC x   Sq Epi: x / Non Sq Epi: x / Bacteria: x        ALLERGIES  No Known Allergies      MEDICATIONS   amLODIPine   Tablet 7.5 milliGRAM(s) Oral daily  chlorhexidine 2% Cloths 1 Application(s) Topical daily  dextrose 10% Bolus 125 milliLiter(s) IV Bolus once  dextrose 5%. 1000 milliLiter(s) IV Continuous <Continuous>  dextrose 5%. 1000 milliLiter(s) IV Continuous <Continuous>  dextrose 50% Injectable 25 Gram(s) IV Push once  dextrose 50% Injectable 12.5 Gram(s) IV Push once  dextrose Oral Gel 15 Gram(s) Oral once PRN  enoxaparin Injectable 40 milliGRAM(s) SubCutaneous <User Schedule>  glucagon  Injectable 1 milliGRAM(s) IntraMuscular once  hydrALAZINE Injectable 10 milliGRAM(s) IV Push every 2 hours PRN  insulin lispro (ADMELOG) corrective regimen sliding scale   SubCutaneous three times a day before meals  labetalol Injectable 10 milliGRAM(s) IV Push every 2 hours PRN  polyethylene glycol 3350 17 Gram(s) Oral daily  senna 2 Tablet(s) Oral at bedtime  sodium chloride 0.9%. 1000 milliLiter(s) IV Continuous <Continuous>          ----------------------------------------------------------------------------------------  PHYSICAL EXAM  Constitutional - NAD, Comfortable  HEENT - NCAT, EOMI  Neck - Supple, No limited ROM  Chest - Breathing comfortably, No wheezing  Cardiovascular - S1S2   Abdomen - Soft   Extremities - No C/C/E, No calf tenderness   Neurologic Exam -                    Cognitive - AAO to self, place, date, year, situation     Communication - Fluent, No dysarthria     Cranial Nerves - CN 2-12 intact     FUNCTIONAL MOTOR EXAM - No focal deficits                    LEFT    UE - ShAB 5/5, EF 5/5, EE 5/5, WE 5/5,  5/5                    RIGHT UE - ShAB 5/5, EF 5/5, EE 5/5, WE 5/5,  5/5                    LEFT    LE - HF 5/5, KE 5/5, DF 5/5, PF 5/5                    RIGHT LE - HF 5/5, KE 5/5, DF 5/5, PF 5/5        Sensory - Intact to LT     Reflexes - DTR Intact, No primitive reflexive     Coordination - FTN intact     OculoVestibular - No saccades, No nystagmus, VOR         Balance - WNL Static  Psychiatric - Mood stable, Affect WNL  ----------------------------------------------------------------------------------------  ASSESSMENT/PLAN  58yMale with functional deficits after IPH.   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 Patient LKW was about 0200 when he was headed into work this am. 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 EMS called. Patient came in as code stroke. Per fer review, Son said patient takes ASA daily for his heart and is unsure of any other medications or medical history. Son reports patient had been complaining of HA for about 2-3weeks. In the ED /137. CTH showed LEFT thalamic IPH. NSGY was consulted recommended admission to the ICU with repeat CTH. rCTH was stable on 4/26/24. PM&R was consulted for disposition recommendations.     Pertinent events:   4/25: Admitted to NSICU. CTA head/neck with severe stenosis/occlusion of right PCA proxmial P3 branch, no evidence of aneurysm or AVM. Cardene gtt started. Stroke consulted. SLP evaluated- recommended pureed diet with NO liquids. Weaned off cardene. Repeat CT head stable  4/26: Repeat CT head stable. Stable for downgrade to telemetry under stroke neurology    Today, the patient was seen and examined in the AM.  ([id number]) was used throughout the conversation. Patient presents with confusion likely secondary to delirium and intermittently answered questions appropriately. He knows his name and that he is in the hospital. He does not know the month or year (says its 1995). He denies any CP/SOB/NV. He is following commands, but has left right confusion. Questionable whether or not he can perform simple math. He is sleeping well. During examination SBP ranged from 170-160. HR nml. Patient is planned for ANTONIO today.      Imaging Reviewed Today:  CT Head No Cont (04.26.24 @ 06:16)   IMPRESSION:  Left thalamocapsular hemorrhage is stable       CT Head No Cont (04.25.24 @ 10:31)   1.  Grossly stable left thalamic hemorrhage.  2.  Midline shift to the right of approximately 0.4 cm.  3.  Chronic ischemic changes.  4.  Redemonstrated partially calcified masslike density in the right   nasal passages/ethmoid air cells, grossly stable.      CT Angio Neck Stroke Protocol w/ IV Cont (04.25.24 @ 04:26)   CTA NECK:  No significant stenosis of the cervical carotid arteries based   on NASCET criteria. Patent cervical vertebral arteries. No evidence of   cervical carotid or vertebral artery dissection.    Polypoid cystic and partially calcified mass in the right nasal passage   with protrudes into the knee nasopharynx and involves the right ethmoid   air cells. Near complete opacification of the right frontal sinus, right   ethmoid air cells, and right sphenoid sinus. Direct endoscopic   visualization is recommended. Correlation with outside prior imaging   would be helpful.    Mild pulmonary interstitial lung edema and small bilateral pleural   effusions.    CTA HEAD:  No high-grade stenosis or occlusion of the major proximal   arterial branches, however, short segment severe stenosis/occlusion of   the right posterior cerebral artery proximal P3 branch. No evidence of an   intracranial arterial aneurysm or arteriovenous malformation on CTA,   specifically, no evidence of vascular malformation in the region of the   left thalamic hemorrhage. No evidence of active extravasation of contrast   within the left thalamic parenchymal hemorrhage.        TTE W or WO Ultrasound Enhancing Agent (04.26.24 @ 12:43)   CONCLUSIONS:      1. Left ventricular cavity is normal in size. Left ventricular systolic function is normal with an ejection fraction visually estimated at 55 to 60 %.   2. Severe asymmetric left ventricular hypertrophy.   3. Normal right ventricular cavity size and normal systolic function.   4. The left atrium is severely dilated.   5. The right atrium is normal in size.   6. Thickened mitral valve leaflets.   7. Mobile structure noted on the posterior mitral valve leaflet may represent ruptured chordae. Cannot rule out vegetation.   8. Severe mitral regurgitation.   9. Trileaflet aortic valve with normal systolic excursion.  10. Mild aortic regurgitation.  11. Mild pulmonic regurgitation.  12. No pericardial effusion seen.  13. Estimated pulmonary artery systolic pressure is 59 mmHg, consistent with moderate-to-severe pulmonary hypertension.  ----------------------------    VITALS  T(C): 36.5 (04-29-24 @ 07:59), Max: 36.7 (04-28-24 @ 16:00)  HR: 64 (04-29-24 @ 08:00) (64 - 81)  BP: 125/95 (04-29-24 @ 08:00) (123/93 - 157/94)  RR: 20 (04-29-24 @ 08:00) (16 - 21)  SpO2: 90% (04-29-24 @ 08:00) (90% - 100%)  Wt(kg): --    PAST MEDICAL & SURGICAL HISTORY  Traumatic blindness    FUNCTIONAL/SOCIAL HISTORY - as per documentation/history  Unclear where he lives. Working. Unable to clearly express which family members he lives with. From [place]. Questionable insurance status.     CURRENT FUNCTIONAL STATUS  4/27 PT   Bed Mobility  Bed Mobility Training Supine-to-Sit: minimum assist (75% patient effort);  moderate assist (50% patient effort);  1 person assist  Bed Mobility Training Limitations: impaired postural control;  decreased strength    Sit-Stand Transfer Training  Transfer Training Sit-to-Stand Transfer: minimum assist (75% patient effort);  1 person assist;  rolling walker  Transfer Training Stand-to-Sit Transfer: minimum assist (75% patient effort);  1 person assist;  rolling walker  Sit-to-Stand Transfer Training Transfer Safety Analysis: decreased strength;  impaired balance    Gait Training  Gait Training: moderate assist (50% patient effort);  1 person assist;  rolling walker;  8 feet  Gait Analysis: 3-point gait   right hip flexion limited in ambulation, dragging RLE;  decreased strength;  impaired balance;  impaired motor control    4/26 OT   Bathing Training:     · Level of [place]	maximum assist (25% patients effort)  · Physical Assist/Nonphysical Assist	1 person assist    Upper Body Dressing Training:     · Level of [place]	moderate assist (50% patients effort); to don gown  · Physical Assist/Nonphysical Assist	1 person assist    Lower Body Dressing Training:     · Level of [place]	maximum assist (25% patients effort); to don socks  · Physical Assist/Nonphysical Assist	1 person assist    Toilet Hygiene Training:     · Level of [place]	dependent (less than 25% patients effort); secondary to [place] catheter    Grooming Training:     · Level of [place]	moderate assist (50% patients effort)  · Physical Assist/Nonphysical Assist	1 person assist    Eating/Self-Feeding Training:     · Level of [place]	minimum assist (75% patients effort)  · Physical Assist/Nonphysical Assist	1 person assist      RECENT LABS/IMAGING  REVIEWED    CBC Full  -  ( 29 Apr 2024 06:39 )  WBC Count : 7.90 K/uL  RBC Count : 4.92 M/uL  Hemoglobin : 14.7 g/dL  Hematocrit : 42.2 %  Platelet Count - Automated : 179 K/uL  Mean Cell Volume : 85.8 fl  Mean Cell Hemoglobin : 29.9 pg  Mean Cell Hemoglobin Concentration : 34.8 gm/dL  Auto Neutrophil # : x  Auto Lymphocyte # : x  Auto Monocyte # : x  Auto Eosinophil # : x  Auto Basophil # : x  Auto Neutrophil % : x  Auto Lymphocyte % : x  Auto Monocyte % : x  Auto Eosinophil % : x  Auto Basophil % : x    04-29    138  |  103  |  11.6  ----------------------------<  96  3.5   |  26.0  |  0.69    Ca    9.0      29 Apr 2024 06:39  Phos  3.7     04-28  Mg     2.1     04-28    Urinalysis Basic - ( 29 Apr 2024 06:39 )    Color: x / Appearance: x / SG: x / pH: x  Gluc: 96 mg/dL / Ketone: x  / Bili: x / Urobili: x   Blood: x / Protein: x / Nitrite: x   Leuk Esterase: x / RBC: x / WBC x   Sq Epi: x / Non Sq Epi: x / Bacteria: x    ALLERGIES  No Known Allergies    MEDICATIONS   amLODIPine   Tablet 7.5 milliGRAM(s) Oral daily  chlorhexidine 2% Cloths 1 Application(s) Topical daily  dextrose 10% Bolus 125 milliLiter(s) IV Bolus once  dextrose 5%. 1000 milliLiter(s) IV Continuous <Continuous>  dextrose 5%. 1000 milliLiter(s) IV Continuous <Continuous>  dextrose 50% Injectable 25 Gram(s) IV Push once  dextrose 50% Injectable 12.5 Gram(s) IV Push once  dextrose Oral Gel 15 Gram(s) Oral once PRN  enoxaparin Injectable 40 milliGRAM(s) SubCutaneous <User Schedule>  glucagon  Injectable 1 milliGRAM(s) IntraMuscular once  hydrALAZINE Injectable 10 milliGRAM(s) IV Push every 2 hours PRN  insulin lispro (ADMELOG) corrective regimen sliding scale   SubCutaneous three times a day before meals  labetalol Injectable 10 milliGRAM(s) IV Push every 2 hours PRN  polyethylene glycol 3350 17 Gram(s) Oral daily  senna 2 Tablet(s) Oral at bedtime  sodium chloride 0.9%. 1000 milliLiter(s) IV Continuous <Continuous>    ----------------------------------------------------------------------------------------  PHYSICAL EXAM  Constitutional - Anxious   HEENT - R eye blindness   Neck - Supple, No limited ROM  Chest - Breathing comfortably on RA   Cardiovascular - RRR    Abdomen - Soft   Extremities - No peripheral edema   Neurologic Exam -  Right/Left confusion                  Cognitive - AAO to self, (-) location, (-) situation, (-) year, (-) month, (-) naming      Communication - (+) fluency, (-) comprehension      Cranial Nerves - Right sided facial droop, PERRL, EOMI, Intact facial sensation, No uvula deviation, 5/5 shoulder shrug      FUNCTIONAL MOTOR EXAM - No focal deficits                    LEFT    UE - ShAB 5/5, EF 5/5, EE 5/5, WE 5/5,  5/5                    RIGHT UE - ShAB 5/5, EF 5/5, EE 5/5, WE 5/5,  5/5                    LEFT    LE - HF 5/5, KE 5/5, DF 5/5, PF 5/5                    RIGHT LE - HF 5/5, KE 5/5, DF 5/5, PF 5/5        Sensory - Intact to LT, but L/R confusion      Reflexes - DTR Intact, No primitive reflexive  Psychiatric - Anxious   ----------------------------------------------------------------------------------------  ASSESSMENT/PLAN  58yMale with functional deficits after L IPH.   L Thalamic IPH - Stable   R PCA Severe Stenosis - Neurology following   HTN - Amlodipine, Hydralazine, Labetalol   Delirium - Delirium precautions, CONSIDER Seroquel 12.5mg qhs   Sleep - RECOMMEND Melatonin 5mg qhs   Pain - RECOMMEND Tylenol 975mg q8h PRN   DVT PPX - SCDs, lovenox   Rehab/Impaired mobility and function - Continuous hospitalization is crucial for managing the patient's acute medical issues (stoke, HTN, vegetations) which have significantly impacted their mobility, quality of life, and function. Rehabilitation recommendations will be based on the patient's functional progress and their ability to participate in and tolerate therapeutic interventions, which may change over time. Maintaining ongoing mobilization by the staff is imperative to prevent secondary medical complications and associated health issues related to debility.    PENDING:   MRI Brain w/o   BP control, Goal <160   ANTONIO on 4/29 (Severe mitral regurgitation, ruptured chordae?)     RECOMMENDATIONS:  1)Tylenol 650mg q6h PRN severe pain   2)Tylenol 325mg q6h PRN min/mod pain   3)Melatonin 5mg qhs   4)CONSIDER Seroquel 12.5mg qhs   5)Delirium precautions     DISPOSITION:   PM&R will continue to monitor functional progress and medical stability during  hospitalization. Due to complex psychosocial factors limiting rehabilitation resources, the patient will likely be discharge HOME. SW/CM to work on safe disposition to the community and provide patient with necessary resources for follow up. Disposition recommendations are subject to change.

## 2024-04-29 NOTE — CONSULT NOTE ADULT - ATTENDING COMMENTS
Patient seen and examined, discussed patient with Dr. Lacey and agree with recommendations.     Rehab/Impaired mobility and function - Patient continues to require hospitalization for the above diagnoses and ongoing active management of comorbid complications (IV meds) that are substantially impairing functional ability and impairing quality of life.     RECOMMEND - OOB daily      Patient with cognitive deficits complicated by expressive deficits with no focal neurological deficits that are incongruent with functional assessment. Will continue to follow. Rehab recommendations are dependent on how functional progress changes as well as how patient continues to participate and tolerate therapeutic interventions, which may change.  However, patient with complex psychosocial factors and limited resources for rehab. Therefore, CM and therapy services will need to work on support in community for disposition of HOME.     Recommend ongoing mobilization by staff to maintain cardiopulmonary function and prevention of secondary complications related to debility. Discussed the specific management and recommendations above with rehab clinical care team/rehab liaison.      Total Time Spent on Encounter (reviewing clinical notes, labs, radiology, medications, patient history/exam, assessment and plan) - 75 minutes

## 2024-04-29 NOTE — PROGRESS NOTE ADULT - PROBLEM SELECTOR PLAN 1
.  -  Mobile structure noted on the posterior mitral valve leaflet may represent ruptured chordae. Cannot rule out vegetation. Severe mitral regurgitation.  - plan for ANTONIO today  - discussed plan yesterady with son, Michel Elliott {[phone]}, via .   - will need consent from son, risks benefits explained, son hesitant but agreeable yesterday  - keep patient NPO  - telemetry reviewed, does not appear to be aflutter, likely artifact, cont to monitor. flutter waves not present in all three leads. .  -  Mobile structure noted on the posterior mitral valve leaflet may represent ruptured chordae. Cannot rule out vegetation. Severe mitral regurgitation.  - plan for ANTONIO today to evaluate futehr MV intervention   - discussed plan yesterady with son, Michel Elliott {[phone]}, via .   - will need consent from son, risks benefits explained, son hesitant but agreeable yesterday  - keep patient NPO  - telemetry reviewed, does not appear to be aflutter, likely artifact, cont to monitor. flutter waves not present in all three leads.  - decrease amlodipine to 5mg  - add losartan 25mg PO daily   - add lasix 20mg PO daily

## 2024-04-29 NOTE — PROGRESS NOTE ADULT - ASSESSMENT
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 Patient LKW was about 0200 when he was headed into work this am. 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 EMS called. Patient came in as code stroke. Per son patient takes asa daily for his heart and is unsure of any other medications or medical history. Son reports patient had been complaining of HA for about 2-3weeks. In the ED /137. CTH showed LEFT thalamic IPH. NIHSS at time of examination was 17. Patient started on Cardene drip. MRs 0. ICH score 2.     -ANTONIO as ordered  -Procedure discussed with patient; risks and benefits explained; questions answered  -Labs and ECG reviewed    Post ANTONIO   -Post ANTONIO orders  -preliminary verbal report; centricity pending - PFO, and Sev MR with flail post leaflet   -discharge home once criteria met with mello score>8  -follow up with Dr. 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 Patient LKW was about 0200 when he was headed into work this am. 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 EMS called. Patient came in as code stroke. Per son patient takes asa daily for his heart and is unsure of any other medications or medical history. Son reports patient had been complaining of HA for about 2-3weeks. In the ED /137. CTH showed LEFT thalamic IPH. NIHSS at time of examination was 17. Patient started on Cardene drip. MRs 0. ICH score 2.     -ANTONIO as ordered  -Procedure discussed with patient; risks and benefits explained; questions answered  -Labs and ECG reviewed    Post ANTONIO   -Post ANTONIO orders  -preliminary verbal report; centricity pending - PFO, and Sev MR with flail post leaflet   -discharge BTB once criteria met with mello score>8  -follow up with Dr. Deleon in 1-2 weeks   -follow a soft, bland diet for the next 4 hours, avoiding hot, spicy, crunchy foods

## 2024-04-29 NOTE — PROGRESS NOTE ADULT - SUBJECTIVE AND OBJECTIVE BOX
Preliminary note, official recommendations pending attending review/signature   Seen and examined by Stroke team attending/team, assessment/ plan as discussed with stroke team attending/team as noted.     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  Per notes, was heading into work and his co-workers noted he suddenly stopped working and had a facial droop.  Initial /137. CTH showed LEFT thalamic IPH. Patient started on Cardene drip and sent to Neuro ICU. Now on stroke service for further workup and evaluation.      SUBJECTIVE: No events overnight.  No new neurologic complaints.  ROS reported negative unless otherwise noted.    amLODIPine   Tablet 7.5 milliGRAM(s) Oral daily  chlorhexidine 2% Cloths 1 Application(s) Topical daily  dextrose 10% Bolus 125 milliLiter(s) IV Bolus once  dextrose 5%. 1000 milliLiter(s) IV Continuous <Continuous>  dextrose 5%. 1000 milliLiter(s) IV Continuous <Continuous>  dextrose 50% Injectable 12.5 Gram(s) IV Push once  dextrose 50% Injectable 25 Gram(s) IV Push once  dextrose Oral Gel 15 Gram(s) Oral once PRN  enoxaparin Injectable 40 milliGRAM(s) SubCutaneous <User Schedule>  glucagon  Injectable 1 milliGRAM(s) IntraMuscular once  hydrALAZINE Injectable 10 milliGRAM(s) IV Push every 2 hours PRN  insulin lispro (ADMELOG) corrective regimen sliding scale   SubCutaneous three times a day before meals  labetalol Injectable 10 milliGRAM(s) IV Push every 2 hours PRN  polyethylene glycol 3350 17 Gram(s) Oral daily  senna 2 Tablet(s) Oral at bedtime  sodium chloride 0.9%. 1000 milliLiter(s) IV Continuous <Continuous>      PHYSICAL EXAM:   Vital Signs Last 24 Hrs  T(C): 36.5 (29 Apr 2024 07:59), Max: 36.7 (28 Apr 2024 16:00)  T(F): 97.7 (29 Apr 2024 07:59), Max: 98.1 (28 Apr 2024 16:00)  HR: 64 (29 Apr 2024 08:00) (64 - 81)  BP: 125/95 (29 Apr 2024 08:00) (123/93 - 157/94)  BP(mean): 114 (29 Apr 2024 08:00) (97 - 121)  RR: 20 (29 Apr 2024 08:00) (16 - 21)  SpO2: 90% (29 Apr 2024 08:00) (90% - 100%)    Parameters below as of 29 Apr 2024 08:00  Patient On (Oxygen Delivery Method): room air    INCOMPLETE EXAM  General: No acute distress.   HEENT: right eye opacified, Head normocephalic, atraumatic. Conjunctivae clear w/o exudates or hemorrhage.  Respiratory: Chest wall symmetric, nontender. no use of accessory muscles, no audible wheezes or rhonchi  Abdominal: Soft, nondistended, nontender. + BDS/4 quad.   Skin: Skin is warm, dry and intact without rashes or lesions.   Extremities: No edema.     Detailed Neurologic Exam:    Mental status: The patient is awake in bed, oriented to self and place, following simple commands.     Cranial nerves: Pupils equal and react symmetrically to light. R eye blind and opacified. Extraocular motion is full with no nystagmus. There is no ptosis. R facial palsy , Palate elevates symmetrically. Tongue is midline.    Motor: There is normal bulk and tone.  There is no tremor.  Strength is +3/5 in the right arm, 5-/5 in right leg    Strength is 5/5 in the left arm and leg.    Sensation: Intact to light touch in 4 extremities, no extinction     Cerebellar: Cannot test on right    Gait : deferred      LABS:                        14.7   7.90  )-----------( 179      ( 29 Apr 2024 06:39 )             42.2    04-29    138  |  103  |  11.6  ----------------------------<  96  3.5   |  26.0  |  0.69    Ca    9.0      29 Apr 2024 06:39  Phos  3.7     04-28  Mg     2.1     04-28          04-25 Chol 156 LDL -96- HDL 48 Trig 59    A1C: 5.6    ECHO:    TTE W or WO Ultrasound Enhancing Agent (04.26.24 @ 12:43)   CONCLUSIONS:    1. Left ventricular cavity is normal in size. Left ventricular systolic function is normal with an ejection fraction visually estimated at 55 to 60 %.   2. Severe asymmetric left ventricular hypertrophy.   3. Normal right ventricular cavity size and normal systolic function.   4. The left atrium is severely dilated.   5. The right atrium is normal in size.   6. Thickened mitral valve leaflets.   7. Mobile structure noted on the posterior mitral valve leaflet may represent ruptured chordae. Cannot rule out vegetation.   8. Severe mitral regurgitation.   9. Trileaflet aortic valve with normal systolic excursion.  10. Mild aortic regurgitation.  11. Mild pulmonic regurgitation.  12. No pericardial effusion seen.  13. Estimated pulmonary artery systolic pressure is 59 mmHg, consistent with moderate-to-severe pulmonary hypertension.    IMAGING: Reviewed by me.     Neuro-Imaging     CT Head No Cont (04.26.24 @ 06:16)   IMPRESSION:  Left thalamocapsular hemorrhage is stable    CT Head No Cont (04.25.24 @ 10:31)   1.  Grossly stable left thalamic hemorrhage.  2.  Midline shift to the right of approximately 0.4 cm.  3.  Chronic ischemic changes.  4.  Redemonstrated partially calcified masslike density in the right   nasal passages/ethmoid air cells, grossly stable.    CT Angio Neck Stroke Protocol w/ IV Cont (04.25.24 @ 04:26)   CTA NECK:  No significant stenosis of the cervical carotid arteries based   on NASCET criteria. Patent cervical vertebral arteries. No evidence of   cervical carotid or vertebral artery dissection.    Polypoid cystic and partially calcified mass in the right nasal passage   with protrudes into the knee nasopharynx and involves the right ethmoid   air cells. Near complete opacification of the right frontal sinus, right   ethmoid air cells, and right sphenoid sinus. Direct endoscopic   visualization is recommended. Correlation with outside prior imaging   would be helpful.    Mild pulmonary interstitial lung edema and small bilateral pleural   effusions.    CTA HEAD:  No high-grade stenosis or occlusion of the major proximal   arterial branches, however, short segment severe stenosis/occlusion of   the right posterior cerebral artery proximal P3 branch. No evidence of an   intracranial arterial aneurysm or arteriovenous malformation on CTA,   specifically, no evidence of vascular malformation in the region of the   left thalamic hemorrhage. No evidence of active extravasation of contrast   within the left thalamic parenchymal hemorrhage.    ULTRASOUND    US Duplex Venous Lower Ext Complete, Bilateral (04.28.24 @ 15:58)   No acute DVT of the lower extremities.       Preliminary note, official recommendations pending attending review/signature   Seen and examined by Stroke team attending/team, assessment/ plan as discussed with stroke team attending/team as noted.   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  Per notes, was heading into work and his co-workers noted he suddenly stopped working and had a facial droop.  Initial /137. CTH showed LEFT thalamic IPH. Patient started on Cardene drip and sent to Neuro ICU. Now on stroke service for further workup and evaluation.      SUBJECTIVE: No events overnight.  No new neurologic complaints.  ROS reported negative unless otherwise noted.    amLODIPine   Tablet 7.5 milliGRAM(s) Oral daily  chlorhexidine 2% Cloths 1 Application(s) Topical daily  dextrose 10% Bolus 125 milliLiter(s) IV Bolus once  dextrose 5%. 1000 milliLiter(s) IV Continuous <Continuous>  dextrose 5%. 1000 milliLiter(s) IV Continuous <Continuous>  dextrose 50% Injectable 12.5 Gram(s) IV Push once  dextrose 50% Injectable 25 Gram(s) IV Push once  dextrose Oral Gel 15 Gram(s) Oral once PRN  enoxaparin Injectable 40 milliGRAM(s) SubCutaneous <User Schedule>  glucagon  Injectable 1 milliGRAM(s) IntraMuscular once  hydrALAZINE Injectable 10 milliGRAM(s) IV Push every 2 hours PRN  insulin lispro (ADMELOG) corrective regimen sliding scale   SubCutaneous three times a day before meals  labetalol Injectable 10 milliGRAM(s) IV Push every 2 hours PRN  polyethylene glycol 3350 17 Gram(s) Oral daily  senna 2 Tablet(s) Oral at bedtime  sodium chloride 0.9%. 1000 milliLiter(s) IV Continuous <Continuous>      PHYSICAL EXAM:   Vital Signs Last 24 Hrs  T(C): 36.5 (29 Apr 2024 07:59), Max: 36.7 (28 Apr 2024 16:00)  T(F): 97.7 (29 Apr 2024 07:59), Max: 98.1 (28 Apr 2024 16:00)  HR: 64 (29 Apr 2024 08:00) (64 - 81)  BP: 125/95 (29 Apr 2024 08:00) (123/93 - 157/94)  BP(mean): 114 (29 Apr 2024 08:00) (97 - 121)  RR: 20 (29 Apr 2024 08:00) (16 - 21)  SpO2: 90% (29 Apr 2024 08:00) (90% - 100%)    Parameters below as of 29 Apr 2024 08:00  Patient On (Oxygen Delivery Method): room air       General: No acute distress.   HEENT: right eye opacified, Head normocephalic, atraumatic. Conjunctivae clear w/o exudates or hemorrhage.  Respiratory: Chest wall symmetric, nontender. no use of accessory muscles, no audible wheezes or rhonchi  Abdominal: Soft, nondistended, nontender. + BDS/4 quad.   Skin: Skin is warm, dry and intact without rashes or lesions.   Extremities: No edema.     Detailed Neurologic Exam:    Mental status: The patient is awake in bed, oriented to self and place, recalls he is having testing, IDs objects, following simple commands and cross midline with slight prompting.     Cranial nerves: Pupils equal and react symmetrically to light. R eye blind and opacified. Extraocular motion is full with no nystagmus. There is no ptosis. R facial palsy , Palate elevates symmetrically. Tongue is midline.    Motor: There is normal bulk and tone.  There is no tremor.  Strength is 4/5 in the right arm, 4+/5 in right leg    Strength is 5/5 in the left arm and leg.    Sensation: Intact to light touch in 4 extremities, no extinction     Cerebellar:  no gross ataxia appreciated, in proportion to weakness     Gait : deferred      LABS:                        14.7   7.90  )-----------( 179      ( 29 Apr 2024 06:39 )             42.2    04-29    138  |  103  |  11.6  ----------------------------<  96  3.5   |  26.0  |  0.69    Ca    9.0      29 Apr 2024 06:39  Phos  3.7     04-28  Mg     2.1     04-28 04-25 Chol 156 LDL -96- HDL 48 Trig 59    A1C: 5.6    ECHO:    TTE W or WO Ultrasound Enhancing Agent (04.26.24 @ 12:43)   CONCLUSIONS:    1. Left ventricular cavity is normal in size. Left ventricular systolic function is normal with an ejection fraction visually estimated at 55 to 60 %.   2. Severe asymmetric left ventricular hypertrophy.   3. Normal right ventricular cavity size and normal systolic function.   4. The left atrium is severely dilated.   5. The right atrium is normal in size.   6. Thickened mitral valve leaflets.   7. Mobile structure noted on the posterior mitral valve leaflet may represent ruptured chordae. Cannot rule out vegetation.   8. Severe mitral regurgitation.   9. Trileaflet aortic valve with normal systolic excursion.  10. Mild aortic regurgitation.  11. Mild pulmonic regurgitation.  12. No pericardial effusion seen.  13. Estimated pulmonary artery systolic pressure is 59 mmHg, consistent with moderate-to-severe pulmonary hypertension.    IMAGING: Reviewed by me.     Neuro-Imaging     CT Head No Cont (04.26.24 @ 06:16)   IMPRESSION:  Left thalamocapsular hemorrhage is stable    CT Head No Cont (04.25.24 @ 10:31)   1.  Grossly stable left thalamic hemorrhage.  2.  Midline shift to the right of approximately 0.4 cm.  3.  Chronic ischemic changes.  4.  Redemonstrated partially calcified masslike density in the right   nasal passages/ethmoid air cells, grossly stable.    CT Angio Neck Stroke Protocol w/ IV Cont (04.25.24 @ 04:26)   CTA NECK:  No significant stenosis of the cervical carotid arteries based   on NASCET criteria. Patent cervical vertebral arteries. No evidence of   cervical carotid or vertebral artery dissection.    Polypoid cystic and partially calcified mass in the right nasal passage   with protrudes into the knee nasopharynx and involves the right ethmoid   air cells. Near complete opacification of the right frontal sinus, right   ethmoid air cells, and right sphenoid sinus. Direct endoscopic   visualization is recommended. Correlation with outside prior imaging   would be helpful.    Mild pulmonary interstitial lung edema and small bilateral pleural   effusions.    CTA HEAD:  No high-grade stenosis or occlusion of the major proximal   arterial branches, however, short segment severe stenosis/occlusion of   the right posterior cerebral artery proximal P3 branch. No evidence of an   intracranial arterial aneurysm or arteriovenous malformation on CTA,   specifically, no evidence of vascular malformation in the region of the   left thalamic hemorrhage. No evidence of active extravasation of contrast   within the left thalamic parenchymal hemorrhage.    ULTRASOUND    US Duplex Venous Lower Ext Complete, Bilateral (04.28.24 @ 15:58)   No acute DVT of the lower extremities.         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  Per notes, was heading into work and his co-workers noted he suddenly stopped working and had a facial droop.  Initial /137. CTH showed LEFT thalamic IPH. Patient started on Cardene drip and sent to Neuro ICU. Now on stroke service for further workup and evaluation.      SUBJECTIVE: No events overnight.  No new neurologic complaints.  ROS reported negative unless otherwise noted.    amLODIPine   Tablet 7.5 milliGRAM(s) Oral daily  chlorhexidine 2% Cloths 1 Application(s) Topical daily  dextrose 10% Bolus 125 milliLiter(s) IV Bolus once  dextrose 5%. 1000 milliLiter(s) IV Continuous <Continuous>  dextrose 5%. 1000 milliLiter(s) IV Continuous <Continuous>  dextrose 50% Injectable 12.5 Gram(s) IV Push once  dextrose 50% Injectable 25 Gram(s) IV Push once  dextrose Oral Gel 15 Gram(s) Oral once PRN  enoxaparin Injectable 40 milliGRAM(s) SubCutaneous <User Schedule>  glucagon  Injectable 1 milliGRAM(s) IntraMuscular once  hydrALAZINE Injectable 10 milliGRAM(s) IV Push every 2 hours PRN  insulin lispro (ADMELOG) corrective regimen sliding scale   SubCutaneous three times a day before meals  labetalol Injectable 10 milliGRAM(s) IV Push every 2 hours PRN  polyethylene glycol 3350 17 Gram(s) Oral daily  senna 2 Tablet(s) Oral at bedtime  sodium chloride 0.9%. 1000 milliLiter(s) IV Continuous <Continuous>      PHYSICAL EXAM:   Vital Signs Last 24 Hrs  T(C): 36.5 (29 Apr 2024 07:59), Max: 36.7 (28 Apr 2024 16:00)  T(F): 97.7 (29 Apr 2024 07:59), Max: 98.1 (28 Apr 2024 16:00)  HR: 64 (29 Apr 2024 08:00) (64 - 81)  BP: 125/95 (29 Apr 2024 08:00) (123/93 - 157/94)  BP(mean): 114 (29 Apr 2024 08:00) (97 - 121)  RR: 20 (29 Apr 2024 08:00) (16 - 21)  SpO2: 90% (29 Apr 2024 08:00) (90% - 100%)    Parameters below as of 29 Apr 2024 08:00  Patient On (Oxygen Delivery Method): room air       General: No acute distress.   HEENT: right eye opacified, Head normocephalic, atraumatic. Conjunctivae clear w/o exudates or hemorrhage.  Respiratory: Chest wall symmetric, nontender. no use of accessory muscles, no audible wheezes or rhonchi  Abdominal: Soft, nondistended, nontender. + BDS/4 quad.   Skin: Skin is warm, dry and intact without rashes or lesions.   Extremities: No edema.     Detailed Neurologic Exam:    Mental status: The patient is awake in bed, oriented to self and place, recalls he is having testing, IDs objects, following simple commands and cross midline with slight prompting.     Cranial nerves: Pupils equal and react symmetrically to light. R eye blind and opacified. Extraocular motion is full with no nystagmus. There is no ptosis. R facial palsy , Palate elevates symmetrically. Tongue is midline.    Motor: There is normal bulk and tone.  There is no tremor.  Strength is 4/5 in the right arm, 4+/5 in right leg    Strength is 5/5 in the left arm and leg. Intermittent shaking of left leg, can stop on command    Sensation: Intact to light touch in 4 extremities, no extinction     Cerebellar:  no gross ataxia appreciated, in proportion to weakness     Gait : deferred      LABS:                        14.7   7.90  )-----------( 179      ( 29 Apr 2024 06:39 )             42.2    04-29    138  |  103  |  11.6  ----------------------------<  96  3.5   |  26.0  |  0.69    Ca    9.0      29 Apr 2024 06:39  Phos  3.7     04-28  Mg     2.1     04-28 04-25 Chol 156 LDL -96- HDL 48 Trig 59    A1C: 5.6    ECHO:    TTE W or WO Ultrasound Enhancing Agent (04.26.24 @ 12:43)   CONCLUSIONS:    1. Left ventricular cavity is normal in size. Left ventricular systolic function is normal with an ejection fraction visually estimated at 55 to 60 %.   2. Severe asymmetric left ventricular hypertrophy.   3. Normal right ventricular cavity size and normal systolic function.   4. The left atrium is severely dilated.   5. The right atrium is normal in size.   6. Thickened mitral valve leaflets.   7. Mobile structure noted on the posterior mitral valve leaflet may represent ruptured chordae. Cannot rule out vegetation.   8. Severe mitral regurgitation.   9. Trileaflet aortic valve with normal systolic excursion.  10. Mild aortic regurgitation.  11. Mild pulmonic regurgitation.  12. No pericardial effusion seen.  13. Estimated pulmonary artery systolic pressure is 59 mmHg, consistent with moderate-to-severe pulmonary hypertension.    IMAGING: Reviewed by me.     Neuro-Imaging     CT Head No Cont (04.26.24 @ 06:16)   IMPRESSION:  Left thalamocapsular hemorrhage is stable    CT Head No Cont (04.25.24 @ 10:31)   1.  Grossly stable left thalamic hemorrhage.  2.  Midline shift to the right of approximately 0.4 cm.  3.  Chronic ischemic changes.  4.  Redemonstrated partially calcified masslike density in the right   nasal passages/ethmoid air cells, grossly stable.    CT Angio Neck Stroke Protocol w/ IV Cont (04.25.24 @ 04:26)   CTA NECK:  No significant stenosis of the cervical carotid arteries based   on NASCET criteria. Patent cervical vertebral arteries. No evidence of   cervical carotid or vertebral artery dissection.    Polypoid cystic and partially calcified mass in the right nasal passage   with protrudes into the knee nasopharynx and involves the right ethmoid   air cells. Near complete opacification of the right frontal sinus, right   ethmoid air cells, and right sphenoid sinus. Direct endoscopic   visualization is recommended. Correlation with outside prior imaging   would be helpful.    Mild pulmonary interstitial lung edema and small bilateral pleural   effusions.    CTA HEAD:  No high-grade stenosis or occlusion of the major proximal   arterial branches, however, short segment severe stenosis/occlusion of   the right posterior cerebral artery proximal P3 branch. No evidence of an   intracranial arterial aneurysm or arteriovenous malformation on CTA,   specifically, no evidence of vascular malformation in the region of the   left thalamic hemorrhage. No evidence of active extravasation of contrast   within the left thalamic parenchymal hemorrhage.    ULTRASOUND    US Duplex Venous Lower Ext Complete, Bilateral (04.28.24 @ 15:58)   No acute DVT of the lower extremities.

## 2024-04-29 NOTE — PROGRESS NOTE ADULT - SUBJECTIVE AND OBJECTIVE BOX
Department of Cardiology                                                                  [facility]/[place] [address] [address]                                                            Telephone: [phone]. 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 code stroke. In the ED /137. CTH showed LEFT thalamic IPH. Patient started on Cardene drip.  Cardiology called for evaluation ich, possible aflutter on telemetry, and abnormal Echo- possible ruptured cordae. Pt confused, alter to self. Son at bedside, states he is improving daily.    ASA and Mallampati: Per Anesthesia    REVIEW OF SYMPTOMS:   CONSTITUTIONAL: No fever, no chills, no weight loss, no weight gain, no fatigue   ENMT:  No vertigo; No sinus or throat pain  NECK: No pain or stiffness  CARDIOVASCULAR: No chest pain, no dyspnea, no syncope/presyncope, no fatigue, no palpitations, no dizziness, no Orthopnea, no Paroxsymal nocturnal dyspnea  RESPIRATORY: No shortness of breath, no cough  : No dysuria, no hematuria   GI: no nausea, no diarrhea, no constipation, no abdominal pain  NEURO: No headache, no slurred speech   MUSCULOSKELETAL: No joint pain or swelling; No muscle, back, or extremity pain  PSYCH: No agitation, no anxiety.    ALL OTHER REVIEW OF SYSTEMS ARE NEGATIVE.	    ALLERGIES:   No Known Allergies    MEDICATIONS:  amLODIPine   Tablet 7.5 milliGRAM(s) Oral daily  hydrALAZINE Injectable 10 milliGRAM(s) IV Push every 2 hours PRN  labetalol Injectable 10 milliGRAM(s) IV Push every 2 hours PRN  polyethylene glycol 3350 17 Gram(s) Oral daily  senna 2 Tablet(s) Oral at bedtime  dextrose 50% Injectable 12.5 Gram(s) IV Push once  dextrose 50% Injectable 25 Gram(s) IV Push once  dextrose Oral Gel 15 Gram(s) Oral once PRN  glucagon  Injectable 1 milliGRAM(s) IntraMuscular once  insulin lispro (ADMELOG) corrective regimen sliding scale   SubCutaneous three times a day before meals  chlorhexidine 2% Cloths 1 Application(s) Topical daily  dextrose 10% Bolus 125 milliLiter(s) IV Bolus once  dextrose 5%. 1000 milliLiter(s) IV Continuous <Continuous>  dextrose 5%. 1000 milliLiter(s) IV Continuous <Continuous>  enoxaparin Injectable 40 milliGRAM(s) SubCutaneous <User Schedule>  sodium chloride 0.9%. 1000 milliLiter(s) IV Continuous <Continuous>    PHYSICAL EXAM:    Vitals  T(C): 36.8 (04-29-24 @ 11:25), Max: 36.8 (04-29-24 @ 11:25)  HR: 89 (04-29-24 @ 11:25) (64 - 89)  BP: 162/102 (04-29-24 @ 11:25) (123/93 - 163/101)  RR: 17 (04-29-24 @ 11:25) (17 - 21)  SpO2: 98% (04-29-24 @ 11:25) (90% - 100%)  Wt(kg): --    I&O's Summary    28 Apr 2024 07:01  -  29 Apr 2024 07:00  --------------------------------------------------------  IN: 1440 mL / OUT: 3700 mL / NET: -2260 mL      PE  Constitutional: A & O x 3  HEENT:   Normal oral mucosa, PERRL, EOMI	  Cardiovascular: Normal S1 S2, No JVD, + murmurs 2 ICS R sternum 4/6 , No edema  Respiratory: Lungs clear to auscultation	  Gastrointestinal:  Soft, Non-tender, + BS	  Skin: No rashes, No ecchymoses, No cyanosis  Neurologic: Non-focal  Extremities: Normal range of motion, No clubbing, cyanosis or edema  Vascular: Peripheral pulses palpable 2+ bilaterally    LABS:	 	    CARDIAC MARKERS:                          14.7   7.90  )-----------( 179      ( 29 Apr 2024 06:39 )             42.2     04-29    138  |  103  |  11.6  ----------------------------<  96  3.5   |  26.0  |  0.69    Ca    9.0      29 Apr 2024 06:39  Phos  3.7     04-28  Mg     2.1     04-28      proBNP:   Lipid Profile:   HgA1c:   TSH: Thyroid Stimulating Hormone, Serum: 4.43 uIU/mL (04-29 @ 06:39)      ASSESSMENT:  Patient is a 58y old  Male who presents with a chief complaint of Left Thalamic IPH     57 y/o male unknown pmhx aside from 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 Patient LKW was about 0200 when he was headed into work this am. 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 EMS called. Patient came in as code stroke. Per son patient takes asa daily for his heart and is unsure of any other medications or medical history. Son reports patient had been complaining of HA for about 2-3weeks. In the ED /137. CTH showed LEFT thalamic IPH. NIHSS at time of examination was 17. Patient started on Cardene drip. MRs 0. ICH score 2.     -ANTONIO as ordered  -Procedure discussed with patient; risks and benefits explained; questions answered  -Labs and ECG reviewed                                                                               Department of Cardiology                                                                  [facility]/[place] [address] [address]                                                            Telephone: [phone]. 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 code stroke. In the ED /137. CTH showed LEFT thalamic IPH. Patient started on Cardene drip.  Cardiology called for evaluation ich, possible aflutter on telemetry, and abnormal Echo- possible ruptured cordae. Pt confused, alter to self. Son at bedside, states he is improving daily.    ASA and Mallampati: Per Anesthesia    REVIEW OF SYMPTOMS:   CONSTITUTIONAL: No fever, no chills, no weight loss, no weight gain, no fatigue   ENMT:  No vertigo; No sinus or throat pain  NECK: No pain or stiffness  CARDIOVASCULAR: No chest pain, no dyspnea, no syncope/presyncope, no fatigue, no palpitations, no dizziness, no Orthopnea, no Paroxsymal nocturnal dyspnea  RESPIRATORY: No shortness of breath, no cough  : No dysuria, no hematuria   GI: no nausea, no diarrhea, no constipation, no abdominal pain  NEURO: No headache, no slurred speech   MUSCULOSKELETAL: No joint pain or swelling; No muscle, back, or extremity pain  PSYCH: No agitation, no anxiety.    ALL OTHER REVIEW OF SYSTEMS ARE NEGATIVE.	    ALLERGIES:   No Known Allergies    MEDICATIONS:  amLODIPine   Tablet 7.5 milliGRAM(s) Oral daily  hydrALAZINE Injectable 10 milliGRAM(s) IV Push every 2 hours PRN  labetalol Injectable 10 milliGRAM(s) IV Push every 2 hours PRN  polyethylene glycol 3350 17 Gram(s) Oral daily  senna 2 Tablet(s) Oral at bedtime  dextrose 50% Injectable 12.5 Gram(s) IV Push once  dextrose 50% Injectable 25 Gram(s) IV Push once  dextrose Oral Gel 15 Gram(s) Oral once PRN  glucagon  Injectable 1 milliGRAM(s) IntraMuscular once  insulin lispro (ADMELOG) corrective regimen sliding scale   SubCutaneous three times a day before meals  chlorhexidine 2% Cloths 1 Application(s) Topical daily  dextrose 10% Bolus 125 milliLiter(s) IV Bolus once  dextrose 5%. 1000 milliLiter(s) IV Continuous <Continuous>  dextrose 5%. 1000 milliLiter(s) IV Continuous <Continuous>  enoxaparin Injectable 40 milliGRAM(s) SubCutaneous <User Schedule>  sodium chloride 0.9%. 1000 milliLiter(s) IV Continuous <Continuous>    PHYSICAL EXAM:    Vitals  T(C): 36.8 (04-29-24 @ 11:25), Max: 36.8 (04-29-24 @ 11:25)  HR: 89 (04-29-24 @ 11:25) (64 - 89)  BP: 162/102 (04-29-24 @ 11:25) (123/93 - 163/101)  RR: 17 (04-29-24 @ 11:25) (17 - 21)  SpO2: 98% (04-29-24 @ 11:25) (90% - 100%)  Wt(kg): --    I&O's Summary    28 Apr 2024 07:01  -  29 Apr 2024 07:00  --------------------------------------------------------  IN: 1440 mL / OUT: 3700 mL / NET: -2260 mL      PE  Constitutional: A & O x 3  HEENT:   Normal oral mucosa, PERRL, EOMI	  Cardiovascular: Normal S1 S2, No JVD, + murmurs 2 ICS R sternum 4/6 , No edema  Respiratory: Lungs clear to auscultation	  Gastrointestinal:  Soft, Non-tender, + BS	  Skin: No rashes, No ecchymoses, No cyanosis  Neurologic: Non-focal  Extremities: Normal range of motion, No clubbing, cyanosis or edema  Vascular: Peripheral pulses palpable 2+ bilaterally    LABS:	 	    CARDIAC MARKERS:                          14.7   7.90  )-----------( 179      ( 29 Apr 2024 06:39 )             42.2     04-29    138  |  103  |  11.6  ----------------------------<  96  3.5   |  26.0  |  0.69    Ca    9.0      29 Apr 2024 06:39  Phos  3.7     04-28  Mg     2.1     04-28      proBNP:   Lipid Profile:   HgA1c:   TSH: Thyroid Stimulating Hormone, Serum: 4.43 uIU/mL (04-29 @ 06:39)

## 2024-04-29 NOTE — PROGRESS NOTE ADULT - ASSESSMENT
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  Per notes, was headed into work and his co-workers noted he suddenly stopped working and had a facial droop.  Initial /137. CTH showed LEFT thalamic IPH. Patient started on Cardene drip and sent to Neuro ICU.  CTA head and neck neg except for stenosis of the R P3 segment, but no vascular malformation or aneurysms were appreciated.     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  Atrial flutter and mitral valve mobile structure being further evaluated- rule out vegetation     NEURO:   -Neurologically without acute change   -Continue close monitoring for neurologic deterioration    - Stroke neuro checks q 2hr    - SBP goal 120-140mMHg for now, gradually titrated avoiding rapid fluctuations and hypotension; over BP< 160/90mmHg    -ANTITHROMBOTIC THERAPY: unless cardiac indication found, no acute neurological indication at this time.  If indicated will need to have ongoing evaluation for timeline as at this time risk of hemorrhage is increased.   -titrate statin based on outpatient ASCVD risk stratification, no cerebral ischemia at this time.  LDL:  96  -MRI Brain w/wo pending , suggest repeat in 4-6 weeks upon resolution of hemorrhage. If valve vegetation present then consideration in selective cerebral angiogram.   -Dysphagia screen: pass, advance diet as tolerated per protocol   -Physical therapy/OT/Speech eval/treatment.       CARDIOVASCULAR:   - TTE  , EF 55-60% with severely dilated left atrium,  LVH, severely dilated LA, mobile mitral valve posterior leaflet possibly ruptured chordae but can not exclude vegetation, cardiac monitoring w/ telemetry for now, further evaluation pending findings of noted workup  -ANTONIO for further differentiation if amenable                               HEMATOLOGY:  -H/H without acute change, Platelets 165, patient should have all age and risk appropriate malignancy screenings with PCP or sooner if clinically suspected   -DVT ppx: Heparin s.c [] LMWH [x]     PULMONARY:   -On room air, protecting airway, saturating well     RENAL:   -BUN/Cr within range, monitor urine output, maintain adequate hydration    -Na Goal:  135-145  -Gallegos: N     ID:   -afebrile, no leukocytosis, monitor for si/sx of infection , blood cx x 2 in setting of r/o vegetation.     OTHER:    -condition and plan of care d/w patient and son at bedside, questions and concerns addressed.     DISPOSITION:   -AR once stable and workup is complete      CORE MEASURES:        Admission NIHSS:      Tenecteplase : [] YES [x] NO      LDL/HDL/A1C: 96/48/5.8     Depression Screen- if depression hx and/or present      Statin Therapy: as noted      Dysphagia Screen: [] PASS [] FAIL     Smoking [] YES [] NO      Afib [] YES [x] NO     Stroke Education [] YES [] NO - p    Obtain screening lower extremity venous ultrasound in patients who meet 1 or more of the following criteria as patient is high risk for DVT/PE on admission:   [] History of DVT/PE  []Hypercoagulable states (Factor V Leiden, Cancer, OCP, etc. 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  Per notes, was headed into work and his co-workers noted he suddenly stopped working and had a facial droop.  Initial /137. CTH showed LEFT thalamic IPH. Patient started on Cardene drip and sent to Neuro ICU.  CTA head and neck neg except for stenosis of the R P3 segment, but no vascular malformation or aneurysms were appreciated.     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  Now  mitral valve mobile structure being further evaluated- rule out vegetation.  Atrial flutter was thought to be detected; per cardiology- likely artifact and does not appear to be atrial flutter.    NEURO:   -Neurologically without acute change , overall some improvement in right hemiparesis.   -Continue close monitoring for neurologic deterioration    - Stroke neuro checks q 2hr    - SBP goal 120-140mMHg for now, gradually titrated avoiding rapid fluctuations and hypotension; over BP< 160/90mmHg    -ANTITHROMBOTIC THERAPY: unless cardiac indication found, no acute neurological indication at this time.  If indicated will need to have ongoing evaluation for timeline as at this time risk of hemorrhage is increased.   -titrate statin based on outpatient ASCVD risk stratification, no cerebral ischemia at this time.  LDL:  96  -MRI Brain w/wo pending , suggest repeat in 4-6 weeks upon resolution of hemorrhage. If valve vegetation present then consideration in selective cerebral angiogram.   -Dysphagia screen: pass, advance diet as tolerated per protocol   -Physical therapy/OT/Speech eval/treatment.       CARDIOVASCULAR:   - TTE  , EF 55-60% with severely dilated left atrium,  LVH, severely dilated LA, mobile mitral valve posterior leaflet possibly ruptured chordae but can not exclude vegetation, cardiac monitoring w/ telemetry for now, further evaluation pending findings of noted workup, consideration in elective cardiac monitoring   -ANTONIO for further differentiation if amenable                               HEMATOLOGY:  -H/H without acute change, Platelets 179, patient should have all age and risk appropriate malignancy screenings with PCP or sooner if clinically suspected   -DVT ppx: Heparin s.c [] LMWH [x]     PULMONARY:   -On room air, protecting airway, saturating well     RENAL:   -BUN/Cr within range, monitor urine output, maintain adequate hydration    -Na Goal:  135-145  -Gallegos: N     ID:   -afebrile, no leukocytosis, monitor for si/sx of infection , blood cx x 2 in setting of r/o vegetation.     OTHER:    -condition and plan of care d/w patient and son at bedside, questions and concerns addressed.   -TSH slightly elevated, suggest close follow up serial monitoring out of acute phase. In interim will obtain baseline T3/4.     DISPOSITION:   -AR once stable and workup is complete      CORE MEASURES:        Admission NIHSS:      Tenecteplase : [] YES [x] NO      LDL/HDL/A1C: 96/48/5.8     Depression Screen- if depression hx and/or present      Statin Therapy: as noted      Dysphagia Screen: [x] PASS [] FAIL     Smoking [] YES [x] NO      Afib [] YES [x] NO     Stroke Education [x] YES [] NO 4/25/24    Obtain screening lower extremity venous ultrasound in patients who meet 1 or more of the following criteria as patient is high risk for DVT/PE on admission:   [] History of DVT/PE  []Hypercoagulable states (Factor V Leiden, Cancer, OCP, etc. 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  Per notes, was headed into work and his co-workers noted he suddenly stopped working and had a facial droop.  Initial /137. CTH showed LEFT thalamic IPH. Patient started on Cardene drip and sent to Neuro ICU.  CTA head and neck neg except for stenosis of the R P3 segment, but no vascular malformation or aneurysms were appreciated.     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  Now  mitral valve mobile structure being further evaluated- rule out vegetation.  Atrial flutter was thought to be detected; per cardiology- likely artifact and does not appear to be atrial flutter.    NEURO:   -Neurologically without acute change , overall some improvement in right hemiparesis.   -Continue close monitoring for neurologic deterioration    - Stroke neuro checks q 2hr    - SBP goal 120-140mMHg for now, gradually titrated avoiding rapid fluctuations and hypotension; over BP< 160/90mmHg    -ANTITHROMBOTIC THERAPY: unless cardiac indication found, no acute neurological indication at this time.  If indicated will need to have ongoing evaluation for timeline as at this time risk of hemorrhage is increased.   -titrate statin based on outpatient ASCVD risk stratification, no cerebral ischemia at this time.  LDL:  96  -MRI Brain w/wo pending , suggest repeat in 4-6 weeks upon resolution of hemorrhage. If valve vegetation present then consideration in selective cerebral angiogram.   -Dysphagia screen: pass, advance diet as tolerated per protocol   -Physical therapy/OT/Speech eval/treatment.     LLE shaking, likely secondary to Anxiety--low suspicion of seizure      CARDIOVASCULAR:   - TTE  , EF 55-60% with severely dilated left atrium,  LVH, severely dilated LA, mobile mitral valve posterior leaflet possibly ruptured chordae but can not exclude vegetation, cardiac monitoring w/ telemetry for now, further evaluation pending findings of noted workup, consideration in elective cardiac monitoring   -ANTONIO for further differentiation if amenable                               HEMATOLOGY:  -H/H without acute change, Platelets 179, patient should have all age and risk appropriate malignancy screenings with PCP or sooner if clinically suspected   -DVT ppx: Heparin s.c [] LMWH [x]     PULMONARY:   -On room air, protecting airway, saturating well     RENAL:   -BUN/Cr within range, monitor urine output, maintain adequate hydration    -Na Goal:  135-145  -Gallegos: N     ID:   -afebrile, no leukocytosis, monitor for si/sx of infection , blood cx x 2 in setting of r/o vegetation.     OTHER:    -condition and plan of care d/w patient and son at bedside, questions and concerns addressed.   -TSH slightly elevated, suggest close follow up serial monitoring out of acute phase. In interim will obtain baseline T3/4.     DISPOSITION:   -AR once stable and workup is complete      CORE MEASURES:        Admission NIHSS:      Tenecteplase : [] YES [x] NO      LDL/HDL/A1C: 96/48/5.8     Depression Screen- if depression hx and/or present      Statin Therapy: as noted      Dysphagia Screen: [x] PASS [] FAIL     Smoking [] YES [x] NO      Afib [] YES [x] NO     Stroke Education [x] YES [] NO 4/25/24    Obtain screening lower extremity venous ultrasound in patients who meet 1 or more of the following criteria as patient is high risk for DVT/PE on admission:   [] History of DVT/PE  []Hypercoagulable states (Factor V Leiden, Cancer, OCP, etc. )  []Prolonged immobility (hemiplegia/hemiparesis/post operative or any other extended immobilization)  [] Transferred from outside facility (Rehab or Long term care)  [] Age </= to 50

## 2024-04-30 LAB
ALBUMIN SERPL ELPH-MCNC: 4 G/DL — SIGNIFICANT CHANGE UP (ref 3.3–5.2)
ALP SERPL-CCNC: 69 U/L — SIGNIFICANT CHANGE UP (ref 40–120)
ALT FLD-CCNC: 10 U/L — SIGNIFICANT CHANGE UP
AMMONIA BLD-MCNC: 33 UMOL/L — SIGNIFICANT CHANGE UP (ref 11–55)
ANION GAP SERPL CALC-SCNC: 12 MMOL/L — SIGNIFICANT CHANGE UP (ref 5–17)
APPEARANCE UR: CLEAR — SIGNIFICANT CHANGE UP
AST SERPL-CCNC: 14 U/L — SIGNIFICANT CHANGE UP
BACTERIA # UR AUTO: NEGATIVE /HPF — SIGNIFICANT CHANGE UP
BASOPHILS # BLD AUTO: 0.07 K/UL — SIGNIFICANT CHANGE UP (ref 0–0.2)
BASOPHILS NFR BLD AUTO: 0.7 % — SIGNIFICANT CHANGE UP (ref 0–2)
BILIRUB SERPL-MCNC: 0.7 MG/DL — SIGNIFICANT CHANGE UP (ref 0.4–2)
BILIRUB UR-MCNC: NEGATIVE — SIGNIFICANT CHANGE UP
BUN SERPL-MCNC: 14.1 MG/DL — SIGNIFICANT CHANGE UP (ref 8–20)
CALCIUM SERPL-MCNC: 9.1 MG/DL — SIGNIFICANT CHANGE UP (ref 8.4–10.5)
CAST: 0 /LPF — SIGNIFICANT CHANGE UP (ref 0–4)
CHLORIDE SERPL-SCNC: 102 MMOL/L — SIGNIFICANT CHANGE UP (ref 96–108)
CO2 SERPL-SCNC: 23 MMOL/L — SIGNIFICANT CHANGE UP (ref 22–29)
COLOR SPEC: YELLOW — SIGNIFICANT CHANGE UP
CREAT SERPL-MCNC: 0.71 MG/DL — SIGNIFICANT CHANGE UP (ref 0.5–1.3)
DIFF PNL FLD: ABNORMAL
EGFR: 108 ML/MIN/1.73M2 — SIGNIFICANT CHANGE UP
EOSINOPHIL # BLD AUTO: 0.45 K/UL — SIGNIFICANT CHANGE UP (ref 0–0.5)
EOSINOPHIL NFR BLD AUTO: 4.7 % — SIGNIFICANT CHANGE UP (ref 0–6)
GLUCOSE BLDC GLUCOMTR-MCNC: 104 MG/DL — HIGH (ref 70–99)
GLUCOSE BLDC GLUCOMTR-MCNC: 108 MG/DL — HIGH (ref 70–99)
GLUCOSE BLDC GLUCOMTR-MCNC: 123 MG/DL — HIGH (ref 70–99)
GLUCOSE SERPL-MCNC: 100 MG/DL — HIGH (ref 70–99)
GLUCOSE UR QL: NEGATIVE MG/DL — SIGNIFICANT CHANGE UP
HCT VFR BLD CALC: 44.5 % — SIGNIFICANT CHANGE UP (ref 39–50)
HGB BLD-MCNC: 15.5 G/DL — SIGNIFICANT CHANGE UP (ref 13–17)
IMM GRANULOCYTES NFR BLD AUTO: 0.3 % — SIGNIFICANT CHANGE UP (ref 0–0.9)
KETONES UR-MCNC: 15 MG/DL
LEUKOCYTE ESTERASE UR-ACNC: NEGATIVE — SIGNIFICANT CHANGE UP
LYMPHOCYTES # BLD AUTO: 1.92 K/UL — SIGNIFICANT CHANGE UP (ref 1–3.3)
LYMPHOCYTES # BLD AUTO: 20 % — SIGNIFICANT CHANGE UP (ref 13–44)
MAGNESIUM SERPL-MCNC: 2.1 MG/DL — SIGNIFICANT CHANGE UP (ref 1.6–2.6)
MCHC RBC-ENTMCNC: 30.1 PG — SIGNIFICANT CHANGE UP (ref 27–34)
MCHC RBC-ENTMCNC: 34.8 GM/DL — SIGNIFICANT CHANGE UP (ref 32–36)
MCV RBC AUTO: 86.4 FL — SIGNIFICANT CHANGE UP (ref 80–100)
MONOCYTES # BLD AUTO: 1.1 K/UL — HIGH (ref 0–0.9)
MONOCYTES NFR BLD AUTO: 11.5 % — SIGNIFICANT CHANGE UP (ref 2–14)
NEUTROPHILS # BLD AUTO: 6.02 K/UL — SIGNIFICANT CHANGE UP (ref 1.8–7.4)
NEUTROPHILS NFR BLD AUTO: 62.8 % — SIGNIFICANT CHANGE UP (ref 43–77)
NITRITE UR-MCNC: NEGATIVE — SIGNIFICANT CHANGE UP
PH UR: 6 — SIGNIFICANT CHANGE UP (ref 5–8)
PHOSPHATE SERPL-MCNC: 3.6 MG/DL — SIGNIFICANT CHANGE UP (ref 2.4–4.7)
PLATELET # BLD AUTO: 203 K/UL — SIGNIFICANT CHANGE UP (ref 150–400)
POTASSIUM SERPL-MCNC: 3.6 MMOL/L — SIGNIFICANT CHANGE UP (ref 3.5–5.3)
POTASSIUM SERPL-SCNC: 3.6 MMOL/L — SIGNIFICANT CHANGE UP (ref 3.5–5.3)
PROT SERPL-MCNC: 7 G/DL — SIGNIFICANT CHANGE UP (ref 6.6–8.7)
PROT UR-MCNC: SIGNIFICANT CHANGE UP MG/DL
RBC # BLD: 5.15 M/UL — SIGNIFICANT CHANGE UP (ref 4.2–5.8)
RBC # FLD: 14 % — SIGNIFICANT CHANGE UP (ref 10.3–14.5)
RBC CASTS # UR COMP ASSIST: 4 /HPF — SIGNIFICANT CHANGE UP (ref 0–4)
SODIUM SERPL-SCNC: 137 MMOL/L — SIGNIFICANT CHANGE UP (ref 135–145)
SP GR SPEC: 1.02 — SIGNIFICANT CHANGE UP (ref 1–1.03)
SQUAMOUS # UR AUTO: 0 /HPF — SIGNIFICANT CHANGE UP (ref 0–5)
T3 SERPL-MCNC: 118 NG/DL — SIGNIFICANT CHANGE UP (ref 80–200)
T4 AB SER-ACNC: 7.6 UG/DL — SIGNIFICANT CHANGE UP (ref 4.5–12)
TSH SERPL-MCNC: 5.24 UIU/ML — HIGH (ref 0.27–4.2)
UROBILINOGEN FLD QL: 1 MG/DL — SIGNIFICANT CHANGE UP (ref 0.2–1)
WBC # BLD: 9.59 K/UL — SIGNIFICANT CHANGE UP (ref 3.8–10.5)
WBC # FLD AUTO: 9.59 K/UL — SIGNIFICANT CHANGE UP (ref 3.8–10.5)
WBC UR QL: 0 /HPF — SIGNIFICANT CHANGE UP (ref 0–5)

## 2024-04-30 PROCEDURE — 70553 MRI BRAIN STEM W/O & W/DYE: CPT | Mod: 26

## 2024-04-30 PROCEDURE — 99233 SBSQ HOSP IP/OBS HIGH 50: CPT

## 2024-04-30 PROCEDURE — 70450 CT HEAD/BRAIN W/O DYE: CPT | Mod: 26

## 2024-04-30 PROCEDURE — 95819 EEG AWAKE AND ASLEEP: CPT | Mod: 26

## 2024-04-30 PROCEDURE — 71045 X-RAY EXAM CHEST 1 VIEW: CPT | Mod: 26

## 2024-04-30 PROCEDURE — 99232 SBSQ HOSP IP/OBS MODERATE 35: CPT

## 2024-04-30 RX ORDER — ACETAMINOPHEN 500 MG
650 TABLET ORAL ONCE
Refills: 0 | Status: COMPLETED | OUTPATIENT
Start: 2024-04-30 | End: 2024-04-30

## 2024-04-30 RX ADMIN — SODIUM CHLORIDE 60 MILLILITER(S): 9 INJECTION INTRAMUSCULAR; INTRAVENOUS; SUBCUTANEOUS at 01:37

## 2024-04-30 RX ADMIN — CHLORHEXIDINE GLUCONATE 1 APPLICATION(S): 213 SOLUTION TOPICAL at 05:18

## 2024-04-30 RX ADMIN — ENOXAPARIN SODIUM 40 MILLIGRAM(S): 100 INJECTION SUBCUTANEOUS at 21:07

## 2024-04-30 RX ADMIN — AMLODIPINE BESYLATE 7.5 MILLIGRAM(S): 2.5 TABLET ORAL at 05:18

## 2024-04-30 RX ADMIN — Medication 650 MILLIGRAM(S): at 23:15

## 2024-04-30 RX ADMIN — SENNA PLUS 2 TABLET(S): 8.6 TABLET ORAL at 21:08

## 2024-04-30 NOTE — PROGRESS NOTE ADULT - SUBJECTIVE AND OBJECTIVE BOX
Patient doing a bit better, less anxious.    FUNCTIONAL PROGRESS  4/27 PT  Bed Mobility  Bed Mobility Training Supine-to-Sit: minimum assist (75% patient effort);  moderate assist (50% patient effort);  1 person assist  Bed Mobility Training Limitations: impaired postural control;  decreased strength    Sit-Stand Transfer Training  Transfer Training Sit-to-Stand Transfer: minimum assist (75% patient effort);  1 person assist;  rolling walker  Transfer Training Stand-to-Sit Transfer: minimum assist (75% patient effort);  1 person assist;  rolling walker  Sit-to-Stand Transfer Training Transfer Safety Analysis: decreased strength;  impaired balance    Gait Training  Gait Training: moderate assist (50% patient effort);  1 person assist;  rolling walker;  8 feet  Gait Analysis: 3-point gait   right hip flexion limited in ambulation, dragging RLE;  decreased strength;  impaired balance;  impaired motor control    4/26 OTBathing Training:     · Level of [place]	maximum assist (25% patients effort)  · Physical Assist/Nonphysical Assist	1 person assist    Upper Body Dressing Training:     · Level of [place]	moderate assist (50% patients effort); to don gown  · Physical Assist/Nonphysical Assist	1 person assist    Lower Body Dressing Training:     · Level of [place]	maximum assist (25% patients effort); to don socks  · Physical Assist/Nonphysical Assist	1 person assist    Toilet Hygiene Training:     · Level of [place]	dependent (less than 25% patients effort); secondary to [place] catheter    Grooming Training:     · Level of [place]	moderate assist (50% patients effort)  · Physical Assist/Nonphysical Assist	1 person assist    Eating/Self-Feeding Training:     · Level of [place]	minimum assist (75% patients effort)  · Physical Assist/Nonphysical Assist	1 person assist    4/26 OT  Bathing Training:     · Level of [place]	maximum assist (25% patients effort)  · Physical Assist/Nonphysical Assist	1 person assist    Upper Body Dressing Training:     · Level of [place]	moderate assist (50% patients effort); to don gown  · Physical Assist/Nonphysical Assist	1 person assist    Lower Body Dressing Training:     · Level of [place]	maximum assist (25% patients effort); to don socks  · Physical Assist/Nonphysical Assist	1 person assist    Toilet Hygiene Training:     · Level of [place]	dependent (less than 25% patients effort); secondary to [place] catheter    Grooming Training:     · Level of [place]	moderate assist (50% patients effort)  · Physical Assist/Nonphysical Assist	1 person assist    Eating/Self-Feeding Training:     · Level of [place]	minimum assist (75% patients effort)  · Physical Assist/Nonphysical Assist	1 person assist      4/26 SLP  Speech Language Pathology Recommendations: 1. Initiate easy to chew solids w/ thin liquids 2. 1:1 assist 3. Aspiration precautions 4. Upright for PO, slow rate, small bites/sips, alternate solids/liquids, check oral cavity for right sided buccal pocketing 5. Oral care6. SLP to follow         VITALS  T(C): 36.5 (04-30-24 @ 04:41), Max: 37.2 (04-30-24 @ 00:18)  HR: 69 (04-30-24 @ 06:00) (64 - 89)  BP: 128/87 (04-30-24 @ 06:00) (104/65 - 163/101)  RR: 20 (04-30-24 @ 06:00) (17 - 22)  SpO2: 95% (04-30-24 @ 06:00) (95% - 100%)  Wt(kg): --    MEDICATIONS   amLODIPine   Tablet 7.5 milliGRAM(s) daily  chlorhexidine 2% Cloths 1 Application(s) daily  dextrose 10% Bolus 125 milliLiter(s) once  dextrose 5%. 1000 milliLiter(s) <Continuous>  dextrose 5%. 1000 milliLiter(s) <Continuous>  dextrose 50% Injectable 25 Gram(s) once  dextrose 50% Injectable 12.5 Gram(s) once  dextrose Oral Gel 15 Gram(s) once PRN  enoxaparin Injectable 40 milliGRAM(s) <User Schedule>  glucagon  Injectable 1 milliGRAM(s) once  hydrALAZINE Injectable 10 milliGRAM(s) every 2 hours PRN  insulin lispro (ADMELOG) corrective regimen sliding scale   three times a day before meals  labetalol Injectable 10 milliGRAM(s) every 2 hours PRN  polyethylene glycol 3350 17 Gram(s) daily  senna 2 Tablet(s) at bedtime  sodium chloride 0.9%. 1000 milliLiter(s) <Continuous>      RECENT LABS/IMAGING  - Reviewed Today                        15.5   9.59  )-----------( 203      ( 30 Apr 2024 05:12 )             44.5     04-30    137  |  102  |  14.1  ----------------------------<  100<H>  3.6   |  23.0  |  0.71    Ca    9.1      30 Apr 2024 05:12  Phos  3.6     04-30  Mg     2.1     04-30    TPro  7.0  /  Alb  4.0  /  TBili  0.7  /  DBili  x   /  AST  14  /  ALT  10  /  AlkPhos  69  04-30      Urinalysis Basic - ( 30 Apr 2024 05:12 )    Color: x / Appearance: x / SG: x / pH: x  Gluc: 100 mg/dL / Ketone: x  / Bili: x / Urobili: x   Blood: x / Protein: x / Nitrite: x   Leuk Esterase: x / RBC: x / WBC x   Sq Epi: x / Non Sq Epi: x / Bacteria: x            CT Head 4/26 - Left thalamocapsular hemorrhage is stable      CT Head 4/25 - 1.  Grossly stable left thalamic hemorrhage. 2.  Midline shift to the right of approximately 0.4 cm. 3.  Chronic ischemic changes. 4.  Redemonstrated partially calcified masslike density in the right  nasal passages/ethmoid air cells, grossly stable.      CTA NECK & HEAD 4/25 -   No significant stenosis of the cervical carotid arteries based on NASCET criteria. Patent cervical vertebral arteries. No evidence of cervical carotid or vertebral artery dissection. Polypoid cystic and partially calcified mass in the right nasal passage with protrudes into the knee nasopharynx and involves the right ethmoid  air cells. Near complete opacification of the right frontal sinus, right ethmoid air cells, and right sphenoid sinus. Direct endoscopic visualization is recommended. Correlation with outside prior imaging  would be helpful. Mild pulmonary interstitial lung edema and small bilateral pleural effusions. CTA HEAD:  No high-grade stenosis or occlusion of the major proximal  arterial branches, however, short segment severe stenosis/occlusion of the right posterior cerebral artery proximal P3 branch. No evidence of an intracranial arterial aneurysm or arteriovenous malformation on CTA, specifically, no evidence of vascular malformation in the region of the left thalamic hemorrhage. No evidence of active extravasation of contrast within the left thalamic parenchymal hemorrhage.        TTE 4/26 -  1. Left ventricular cavity is normal in size. Left ventricular systolic function is normal with an ejection fraction visually estimated at 55 to 60 %.   2. Severe asymmetric left ventricular hypertrophy.   3. Normal right ventricular cavity size and normal systolic function.   4. The left atrium is severely dilated.   5. The right atrium is normal in size.   6. Thickened mitral valve leaflets.   7. Mobile structure noted on the posterior mitral valve leaflet may represent ruptured chordae. Cannot rule out vegetation.   8. Severe mitral regurgitation.   9. Trileaflet aortic valve with normal systolic excursion.  10. Mild aortic regurgitation.  11. Mild pulmonic regurgitation.  12. No pericardial effusion seen.  13. Estimated pulmonary artery systolic pressure is 59 mmHg, consistent with moderate-to-severe pulmonary hypertension.    HEAD CT 4/30 - Stable acute parenchymal hemorrhage with slightly decreased intraventricular hemorrhage.  ----------------------------------------------------------------------------------------  PHYSICAL EXAM  Constitutional - NAD. Comfortable  Extremities - No edema   Neurologic Exam -        Cognitive - AAOx self, being in hospital, PART situation      Communication - Expressive/Receptive deficits     Cranial Nerves - Right lip droop      FUNCTIONAL MOTOR EXAM - No focal deficits, Right/Left discrimination impairements     Sensory - ?Left decrease to LT  Psychiatric - Restless  ----------------------------------------------------------------------------------------  ASSESSMENT/PLAN  58yMale with functional deficits after developing a intraparenchymal hemorrhage.   Left Thalamic IPH - Stable   Right PCA Stenosis - Monitoring  HTN - Amlodipine, Hydralazine, Labetalol   Sleep - RECOMMEND Melatonin 5mg qhs   Pain - RECOMMEND Tylenol 975mg PRN   DVT PPX - SCDs, Lovenox  Rehab/Impaired mobility and function - Patient continues to require hospitalization for the above diagnoses and ongoing active management of comorbid complications (MRI pending, IV meds) that are substantially impairing functional ability and impairing quality of life.     RECOMMEND - OOB daily      Patient with cognitive deficits complicated by expressive deficits with no focal neurological deficits that are incongruent with functional assessment. Possibly related to patient's anxiety.     Patient with complex psychosocial factors and limited resources for rehab. Therefore, CM and therapy services will need to work on support in community for disposition of HOME.     Will continue to follow. Recommend ongoing mobilization by staff to maintain cardiopulmonary function and prevention of secondary complications related to debility. Discussed the specific management and recommendations above with rehab clinical care team/rehab liaison.   Patient doing a bit better, less anxious.    FUNCTIONAL PROGRESS  4/27 PT  Bed Mobility  Bed Mobility Training Supine-to-Sit: minimum assist (75% patient effort);  moderate assist (50% patient effort);  1 person assist  Bed Mobility Training Limitations: impaired postural control;  decreased strength    Sit-Stand Transfer Training  Transfer Training Sit-to-Stand Transfer: minimum assist (75% patient effort);  1 person assist;  rolling walker  Transfer Training Stand-to-Sit Transfer: minimum assist (75% patient effort);  1 person assist;  rolling walker  Sit-to-Stand Transfer Training Transfer Safety Analysis: decreased strength;  impaired balance    Gait Training  Gait Training: moderate assist (50% patient effort);  1 person assist;  rolling walker;  8 feet  Gait Analysis: 3-point gait   right hip flexion limited in ambulation, dragging RLE;  decreased strength;  impaired balance;  impaired motor control    4/26 OTBathing Training:     · Level of [place]	maximum assist (25% patients effort)  · Physical Assist/Nonphysical Assist	1 person assist    Upper Body Dressing Training:     · Level of [place]	moderate assist (50% patients effort); to don gown  · Physical Assist/Nonphysical Assist	1 person assist    Lower Body Dressing Training:     · Level of [place]	maximum assist (25% patients effort); to don socks  · Physical Assist/Nonphysical Assist	1 person assist    Toilet Hygiene Training:     · Level of [place]	dependent (less than 25% patients effort); secondary to [place] catheter    Grooming Training:     · Level of [place]	moderate assist (50% patients effort)  · Physical Assist/Nonphysical Assist	1 person assist    Eating/Self-Feeding Training:     · Level of [place]	minimum assist (75% patients effort)  · Physical Assist/Nonphysical Assist	1 person assist    4/26 OT  Bathing Training:     · Level of [place]	maximum assist (25% patients effort)  · Physical Assist/Nonphysical Assist	1 person assist    Upper Body Dressing Training:     · Level of [place]	moderate assist (50% patients effort); to don gown  · Physical Assist/Nonphysical Assist	1 person assist    Lower Body Dressing Training:     · Level of [place]	maximum assist (25% patients effort); to don socks  · Physical Assist/Nonphysical Assist	1 person assist    Toilet Hygiene Training:     · Level of [place]	dependent (less than 25% patients effort); secondary to [place] catheter    Grooming Training:     · Level of [place]	moderate assist (50% patients effort)  · Physical Assist/Nonphysical Assist	1 person assist    Eating/Self-Feeding Training:     · Level of [place]	minimum assist (75% patients effort)  · Physical Assist/Nonphysical Assist	1 person assist      4/26 SLP  Speech Language Pathology Recommendations: 1. Initiate easy to chew solids w/ thin liquids 2. 1:1 assist 3. Aspiration precautions 4. Upright for PO, slow rate, small bites/sips, alternate solids/liquids, check oral cavity for right sided buccal pocketing 5. Oral care6. SLP to follow         VITALS  T(C): 36.5 (04-30-24 @ 04:41), Max: 37.2 (04-30-24 @ 00:18)  HR: 69 (04-30-24 @ 06:00) (64 - 89)  BP: 128/87 (04-30-24 @ 06:00) (104/65 - 163/101)  RR: 20 (04-30-24 @ 06:00) (17 - 22)  SpO2: 95% (04-30-24 @ 06:00) (95% - 100%)  Wt(kg): --    MEDICATIONS   amLODIPine   Tablet 7.5 milliGRAM(s) daily  chlorhexidine 2% Cloths 1 Application(s) daily  dextrose 10% Bolus 125 milliLiter(s) once  dextrose 5%. 1000 milliLiter(s) <Continuous>  dextrose 5%. 1000 milliLiter(s) <Continuous>  dextrose 50% Injectable 25 Gram(s) once  dextrose 50% Injectable 12.5 Gram(s) once  dextrose Oral Gel 15 Gram(s) once PRN  enoxaparin Injectable 40 milliGRAM(s) <User Schedule>  glucagon  Injectable 1 milliGRAM(s) once  hydrALAZINE Injectable 10 milliGRAM(s) every 2 hours PRN  insulin lispro (ADMELOG) corrective regimen sliding scale   three times a day before meals  labetalol Injectable 10 milliGRAM(s) every 2 hours PRN  polyethylene glycol 3350 17 Gram(s) daily  senna 2 Tablet(s) at bedtime  sodium chloride 0.9%. 1000 milliLiter(s) <Continuous>      RECENT LABS/IMAGING  - Reviewed Today                        15.5   9.59  )-----------( 203      ( 30 Apr 2024 05:12 )             44.5     04-30    137  |  102  |  14.1  ----------------------------<  100<H>  3.6   |  23.0  |  0.71    Ca    9.1      30 Apr 2024 05:12  Phos  3.6     04-30  Mg     2.1     04-30    TPro  7.0  /  Alb  4.0  /  TBili  0.7  /  DBili  x   /  AST  14  /  ALT  10  /  AlkPhos  69  04-30      Urinalysis Basic - ( 30 Apr 2024 05:12 )    Color: x / Appearance: x / SG: x / pH: x  Gluc: 100 mg/dL / Ketone: x  / Bili: x / Urobili: x   Blood: x / Protein: x / Nitrite: x   Leuk Esterase: x / RBC: x / WBC x   Sq Epi: x / Non Sq Epi: x / Bacteria: x            CT Head 4/26 - Left thalamocapsular hemorrhage is stable      CT Head 4/25 - 1.  Grossly stable left thalamic hemorrhage. 2.  Midline shift to the right of approximately 0.4 cm. 3.  Chronic ischemic changes. 4.  Redemonstrated partially calcified masslike density in the right  nasal passages/ethmoid air cells, grossly stable.      CTA NECK & HEAD 4/25 -   No significant stenosis of the cervical carotid arteries based on NASCET criteria. Patent cervical vertebral arteries. No evidence of cervical carotid or vertebral artery dissection. Polypoid cystic and partially calcified mass in the right nasal passage with protrudes into the knee nasopharynx and involves the right ethmoid  air cells. Near complete opacification of the right frontal sinus, right ethmoid air cells, and right sphenoid sinus. Direct endoscopic visualization is recommended. Correlation with outside prior imaging  would be helpful. Mild pulmonary interstitial lung edema and small bilateral pleural effusions. CTA HEAD:  No high-grade stenosis or occlusion of the major proximal  arterial branches, however, short segment severe stenosis/occlusion of the right posterior cerebral artery proximal P3 branch. No evidence of an intracranial arterial aneurysm or arteriovenous malformation on CTA, specifically, no evidence of vascular malformation in the region of the left thalamic hemorrhage. No evidence of active extravasation of contrast within the left thalamic parenchymal hemorrhage.        TTE 4/26 -  1. Left ventricular cavity is normal in size. Left ventricular systolic function is normal with an ejection fraction visually estimated at 55 to 60 %.   2. Severe asymmetric left ventricular hypertrophy.   3. Normal right ventricular cavity size and normal systolic function.   4. The left atrium is severely dilated.   5. The right atrium is normal in size.   6. Thickened mitral valve leaflets.   7. Mobile structure noted on the posterior mitral valve leaflet may represent ruptured chordae. Cannot rule out vegetation.   8. Severe mitral regurgitation.   9. Trileaflet aortic valve with normal systolic excursion.  10. Mild aortic regurgitation.  11. Mild pulmonic regurgitation.  12. No pericardial effusion seen.  13. Estimated pulmonary artery systolic pressure is 59 mmHg, consistent with moderate-to-severe pulmonary hypertension.    HEAD CT 4/30 - Stable acute parenchymal hemorrhage with slightly decreased intraventricular hemorrhage.  ----------------------------------------------------------------------------------------  PHYSICAL EXAM  Constitutional - NAD. Comfortable  Extremities - No edema   Neurologic Exam -        Cognitive - AAOx self, being in hospital, PART situation      Communication - Expressive/Receptive deficits     Cranial Nerves - Right lip droop      FUNCTIONAL MOTOR EXAM - Noted right SH FLEXION and KE weakness with slowed movement, Right/Left discrimination impairments     Sensory - ?Left decrease to LT  Psychiatric - Restless  ----------------------------------------------------------------------------------------  ASSESSMENT/PLAN  58yMale with functional deficits after developing a intraparenchymal hemorrhage.   Left Thalamic IPH - Stable   Right PCA Stenosis - Monitoring  HTN - Amlodipine, Hydralazine, Labetalol   Sleep - RECOMMEND Melatonin 5mg qhs   Pain - RECOMMEND Tylenol 975mg PRN   DVT PPX - SCDs, Lovenox  Rehab/Impaired mobility and function - Patient continues to require hospitalization for the above diagnoses and ongoing active management of comorbid complications (MRI pending, IV meds) that are substantially impairing functional ability and impairing quality of life.     RECOMMEND - OOB daily      Patient with cognitive deficits complicated by expressive deficits with no focal neurological deficits that are incongruent with functional assessment. Possibly related to patient's anxiety.     Patient with complex psychosocial factors and limited resources for rehab. Therefore, CM and therapy services will need to work on support in community for disposition of HOME.     Will continue to follow. Recommend ongoing mobilization by staff to maintain cardiopulmonary function and prevention of secondary complications related to debility. Discussed the specific management and recommendations above with rehab clinical care team/rehab liaison.

## 2024-04-30 NOTE — PROGRESS NOTE ADULT - ASSESSMENT
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  Per notes, was headed into work and his co-workers noted he suddenly stopped working and had a facial droop.  Initial /137. CTH showed LEFT thalamic IPH. Patient started on Cardene drip and sent to Neuro ICU.  CTA head and neck neg except for stenosis of the R P3 segment, but no vascular malformation or aneurysms were appreciated.     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  Now  mitral valve mobile structure being further evaluated- rule out vegetation.  Atrial flutter was thought to be detected; per cardiology- likely artifact and does not appear to be atrial flutter.    NEURO:   -Neurologically without acute change , overall some improvement in right hemiparesis.   -Continue close monitoring for neurologic deterioration    - Stroke neuro checks q 2hr    - SBP goal 120-140mMHg for now, gradually titrated avoiding rapid fluctuations and hypotension; over BP< 160/90mmHg    -ANTITHROMBOTIC THERAPY: unless cardiac indication found, no acute neurological indication at this time.  If indicated will need to have ongoing evaluation for timeline as at this time risk of hemorrhage is increased.   -titrate statin based on outpatient ASCVD risk stratification, no cerebral ischemia at this time.  LDL:  96  -MRI Brain w/wo pending , suggest repeat in 4-6 weeks upon resolution of hemorrhage. If valve vegetation present then consideration in selective cerebral angiogram.   -Dysphagia screen: pass, advance diet as tolerated per protocol   -Physical therapy/OT/Speech eval/treatment.     LLE shaking, likely secondary to Anxiety--low suspicion of seizure      CARDIOVASCULAR:   - TTE  , EF 55-60% with severely dilated left atrium,  LVH, severely dilated LA, mobile mitral valve posterior leaflet possibly ruptured chordae but can not exclude vegetation, cardiac monitoring w/ telemetry for now, further evaluation pending findings of noted workup, consideration in elective cardiac monitoring   -ANTONIO for further differentiation if amenable                               HEMATOLOGY:  -H/H without acute change, Platelets 179, patient should have all age and risk appropriate malignancy screenings with PCP or sooner if clinically suspected   -DVT ppx: Heparin s.c [] LMWH [x]     PULMONARY:   -On room air, protecting airway, saturating well     RENAL:   -BUN/Cr within range, monitor urine output, maintain adequate hydration    -Na Goal:  135-145  -Gallegos: N     ID:   -afebrile, no leukocytosis, monitor for si/sx of infection , blood cx x 2 in setting of r/o vegetation.     OTHER:    -condition and plan of care d/w patient and son at bedside, questions and concerns addressed.   -TSH slightly elevated, suggest close follow up serial monitoring out of acute phase. In interim will obtain baseline T3/4.     DISPOSITION:   -AR once stable and workup is complete      CORE MEASURES:        Admission NIHSS:      Tenecteplase : [] YES [x] NO      LDL/HDL/A1C: 96/48/5.8     Depression Screen- if depression hx and/or present      Statin Therapy: as noted      Dysphagia Screen: [x] PASS [] FAIL     Smoking [] YES [x] NO      Afib [] YES [x] NO     Stroke Education [x] YES [] NO 4/25/24    Obtain screening lower extremity venous ultrasound in patients who meet 1 or more of the following criteria as patient is high risk for DVT/PE on admission:   [] History of DVT/PE  []Hypercoagulable states (Factor V Leiden, Cancer, OCP, etc. 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  Per notes, was headed into work and his co-workers noted he suddenly stopped working and had a facial droop.  Initial /137. CTH showed LEFT thalamic IPH. Patient started on Cardene drip and sent to Neuro ICU.  CTA head and neck neg except for stenosis of the R P3 segment, but no vascular malformation or aneurysms were appreciated. MRI demonstrated chronic small vessel disease, again noted left thalamic/basal ganglia/corona radiata acute parenchymal hemorrhage with cerebral edema, mass effect, and brain compression.  There was no noted areas of abnormal enhancement.  Additional regions of susceptibility seen throughout consistent with regions of prior hemorrhage.      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  severe MR w/ flailed posterior mitral leaflet.  Atrial flutter was thought to be detected; per cardiology- likely artifact and does not appear to be atrial flutter.    NEURO:   -Neurologically without acute change , overall some improvement in right hemiparesis. Fluctuations in orientation possibly related to hospital delirium none the less infectious process being screened for, will obtain routine spot EEG, ammonia wnl.   -CT head was repeated with no acute change and slight improved IV extension.   -Continue close monitoring for neurologic deterioration    - Stroke neuro checks q 2hr    - SBP goal 120-140mMHg for now, gradually titrated avoiding rapid fluctuations and hypotension; over BP< 160/90mmHg    -ANTITHROMBOTIC THERAPY: unless cardiac indication found, no acute neurological indication at this time.  If indicated will need to have ongoing evaluation for timeline as at this time risk of hemorrhage is increased.   -titrate statin based on outpatient ASCVD risk stratification, no cerebral ischemia at this time.  LDL:  96  -MRI Brain w/wo pending , suggest repeat in 4-6 weeks upon resolution of hemorrhage. If valve vegetation present then consideration in selective cerebral angiogram.   -Dysphagia screen: pass, advance diet as tolerated per protocol   -Physical therapy/OT/Speech eval/treatment.     LLE shaking, likely secondary to Anxiety--low suspicion of seizure      CARDIOVASCULAR:   - TTE  , EF 55-60% with severely dilated left atrium,  LVH, severely dilated LA, mobile mitral valve posterior leaflet possibly ruptured chordae but can not exclude vegetation, cardiac monitoring w/ telemetry for now, further evaluation pending findings of noted workup, consideration in elective cardiac monitoring   -ANTONIO for further differentiation if amenable                               HEMATOLOGY:  -H/H without acute change, Platelets 179, patient should have all age and risk appropriate malignancy screenings with PCP or sooner if clinically suspected   -DVT ppx: Heparin s.c [] LMWH [x]     PULMONARY:   -On room air, protecting airway, saturating well     RENAL:   -BUN/Cr within range, monitor urine output, maintain adequate hydration    -Na Goal:  135-145  -Gallegos: N     ID:   -afebrile, no leukocytosis, monitor for si/sx of infection , blood cx x 2 in setting of r/o vegetation.     OTHER:    -condition and plan of care d/w patient and son at bedside, questions and concerns addressed.   -TSH slightly elevated, suggest close follow up serial monitoring out of acute phase. In interim will obtain baseline T3/4.     DISPOSITION:   -AR once stable and workup is complete      CORE MEASURES:        Admission NIHSS:      Tenecteplase : [] YES [x] NO      LDL/HDL/A1C: 96/48/5.8     Depression Screen- if depression hx and/or present      Statin Therapy: as noted      Dysphagia Screen: [x] PASS [] FAIL     Smoking [] YES [x] NO      Afib [] YES [x] NO     Stroke Education [x] YES [] NO 4/25/24    Obtain screening lower extremity venous ultrasound in patients who meet 1 or more of the following criteria as patient is high risk for DVT/PE on admission:   [] History of DVT/PE  []Hypercoagulable states (Factor V Leiden, Cancer, OCP, etc. 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  Per notes, was headed into work and his co-workers noted he suddenly stopped working and had a facial droop.  Initial /137. CTH showed LEFT thalamic IPH. Patient started on Cardene drip and sent to Neuro ICU.  CTA head and neck neg except for stenosis of the R P3 segment, but no vascular malformation or aneurysms were appreciated. MRI demonstrated chronic small vessel disease, again noted left thalamic/basal ganglia/corona radiata acute parenchymal hemorrhage with cerebral edema, mass effect, and brain compression.  There was no noted areas of abnormal enhancement.  Additional regions of susceptibility seen throughout consistent with regions of prior hemorrhage.      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  severe MR w/ flailed posterior mitral leaflet.  Atrial flutter was thought to be detected; per cardiology- likely artifact and does not appear to be atrial flutter.    NEURO:   -Neurologically without acute change , overall some improvement in right hemiparesis. Fluctuations in orientation possibly related to hospital delirium none the less infectious process being screened for, LLE shaking, likely secondary to Anxiety--low suspicion of seizure, will obtain routine spot EEG, ammonia wnl.   -CT head was repeated with no acute change and slight improved IV extension.   -Continue close monitoring for neurologic deterioration    - Stroke neuro checks q 2hr    - SBP goal 120-140mMHg for now, gradually titrated avoiding rapid fluctuations and hypotension; over BP< 160/90mmHg    -ANTITHROMBOTIC THERAPY: unless cardiac indication found, no acute neurological indication at this time.  If indicated will need to have ongoing evaluation for timeline as at this time risk of hemorrhage is increased.   -titrate statin based on outpatient ASCVD risk stratification, no cerebral ischemia at this time.  LDL:  96  -MRI Brain w/wo pending , suggest repeat in 4-6 weeks upon resolution of hemorrhage. If valve vegetation present then consideration in selective cerebral angiogram.   -Dysphagia screen: pass, advance diet as tolerated per protocol   -Physical therapy/OT/Speech eval/treatment.     CARDIOVASCULAR:   - TTE  , EF 55-60% with severely dilated left atrium,  LVH, severely dilated LA, mobile mitral valve posterior leaflet possibly ruptured chordae but can not exclude vegetation, cardiac monitoring w/ telemetry for now, further evaluation pending findings of noted workup, consideration in elective cardiac monitoring   -ANTONIO  6. Flailed posterior mitral leaflet (P3 scallop) with severe eccentric mitral regurgitation, jet is eccentric anteriorly directed, flailed gap of 0.46 cm.   7. Patent foramen ovale with left to right shunting by color flow Doppler.  ejection fraction visually estimated at 60 to 65 %.  -cardiology follow up for long term plan re: MV.    -No acute neurological indication for PFO closure, non ischemic stroke and underlying hemorrhage/possible cerebral amyloid angiopathy.                             HEMATOLOGY:  -H/H without acute change, Platelets 203, patient should have all age and risk appropriate malignancy screenings with PCP or sooner if clinically suspected   -DVT ppx: Heparin s.c [] LMWH [x]     PULMONARY:   -On room air, protecting airway, saturating well     RENAL:   -BUN/Cr within range, monitor urine output, maintain adequate hydration    -Na Goal:  135-145  -Gallegos: N     ID:   -afebrile, no leukocytosis, monitor for si/sx of infection , blood cx x 2 in setting of r/o vegetation NGTD, UA neg nitrite/LE/bacteria.     OTHER:    -condition and plan of care d/w patient and son at bedside, questions and concerns addressed.   -TSH slightly elevated, suggest close follow up serial monitoring out of acute phase.  T3/4 within limits.     DISPOSITION:   -AR once stable and workup is complete      CORE MEASURES:        Admission NIHSS:      Tenecteplase : [] YES [x] NO      LDL/HDL/A1C: 96/48/5.8     Depression Screen- if depression hx and/or present      Statin Therapy: as noted      Dysphagia Screen: [x] PASS [] FAIL     Smoking [] YES [x] NO      Afib [] YES [x] NO     Stroke Education [x] YES [] NO 4/25/24    Obtain screening lower extremity venous ultrasound in patients who meet 1 or more of the following criteria as patient is high risk for DVT/PE on admission:   [] History of DVT/PE  []Hypercoagulable states (Factor V Leiden, Cancer, OCP, etc. 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  Per notes, was headed into work and his co-workers noted he suddenly stopped working and had a facial droop.  Initial /137. CTH showed LEFT thalamic IPH. Patient started on Cardene drip and sent to Neuro ICU.  CTA head and neck neg except for stenosis of the R P3 segment, but no vascular malformation or aneurysms were appreciated. MRI demonstrated chronic small vessel disease, again noted left thalamic/basal ganglia/corona radiata acute parenchymal hemorrhage with cerebral edema, mass effect, and brain compression.  There was no noted areas of abnormal enhancement.  Additional regions of susceptibility seen throughout consistent with regions of prior hemorrhage.      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  severe MR w/ flailed posterior mitral leaflet.  Atrial flutter was thought to be detected; per cardiology- likely artifact and does not appear to be atrial flutter.    NEURO:   -Neurologically without acute change , overall some improvement in right hemiparesis. Fluctuations in orientation possibly related to hospital delirium none the less infectious process being screened for, LLE shaking, likely secondary to Anxiety--low suspicion of seizure, will obtain routine spot EEG, ammonia wnl.   -CT head was repeated with no acute change and slight improved IV extension.   -Continue close monitoring for neurologic deterioration    - Stroke neuro checks q 2hr    - SBP goal 120-140mMHg for now, gradually titrated avoiding rapid fluctuations and hypotension; over BP< 160/90mmHg    -ANTITHROMBOTIC THERAPY: unless cardiac indication found, no acute neurological indication at this time.  If indicated will need to have ongoing evaluation for timeline as at this time risk of hemorrhage is increased.   -titrate statin based on outpatient ASCVD risk stratification, no cerebral ischemia at this time.  LDL:  96  -MRI Brain w/wo pending , suggest repeat in 4-6 weeks upon resolution of hemorrhage. If valve vegetation present then consideration in selective cerebral angiogram.   -Dysphagia screen: pass, advance diet as tolerated per protocol   -Physical therapy/OT/Speech eval/treatment.     CARDIOVASCULAR:   - TTE  , EF 55-60% with severely dilated left atrium,  LVH, severely dilated LA, mobile mitral valve posterior leaflet possibly ruptured chordae but can not exclude vegetation, cardiac monitoring w/ telemetry for now, further evaluation pending findings of noted workup, consideration in elective cardiac monitoring   -ANTONIO  6. Flailed posterior mitral leaflet (P3 scallop) with severe eccentric mitral regurgitation, jet is eccentric anteriorly directed, flailed gap of 0.46 cm.   7. Patent foramen ovale with left to right shunting by color flow Doppler.  ejection fraction visually estimated at 60 to 65 %.  -cardiology follow up for long term plan re: MV.    -No acute neurological indication for PFO closure, non ischemic stroke and underlying hemorrhage/possible cerebral amyloid angiopathy.                             HEMATOLOGY:  -H/H without acute change, Platelets 203, patient should have all age and risk appropriate malignancy screenings with PCP or sooner if clinically suspected   -DVT ppx: Heparin s.c [] LMWH [x]     PULMONARY:   -On room air, protecting airway, saturating well     RENAL:   -BUN/Cr within range, monitor urine output, maintain adequate hydration    -Na Goal:  135-145  -Gallegos: N     ID:   -afebrile, no leukocytosis, monitor for si/sx of infection , blood cx x 2 in setting of r/o vegetation NGTD, UA neg nitrite/LE/bacteria.     OTHER:    -condition and plan of care d/w patient and son at bedside, questions and concerns addressed.   -TSH slightly elevated, suggest close follow up serial monitoring out of acute phase.  T3/4 within limits.     DISPOSITION:   Home once able.  Will d/w son.  Cannot go to Acute Rehab.        CORE MEASURES:        Admission NIHSS:      Tenecteplase : [] YES [x] NO      LDL/HDL/A1C: 96/48/5.8     Depression Screen- if depression hx and/or present      Statin Therapy: as noted      Dysphagia Screen: [x] PASS [] FAIL     Smoking [] YES [x] NO      Afib [] YES [x] NO     Stroke Education [x] YES [] NO 4/25/24    Obtain screening lower extremity venous ultrasound in patients who meet 1 or more of the following criteria as patient is high risk for DVT/PE on admission:   [] History of DVT/PE  []Hypercoagulable states (Factor V Leiden, Cancer, OCP, etc. 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  Per notes, was headed into work and his co-workers noted he suddenly stopped working and had a facial droop.  Initial /137. CTH showed LEFT thalamic IPH. Patient started on Cardene drip and sent to Neuro ICU.  CTA head and neck neg except for stenosis of the R P3 segment, but no vascular malformation or aneurysms were appreciated. MRI demonstrated chronic small vessel disease, again noted left thalamic/basal ganglia/corona radiata acute parenchymal hemorrhage with cerebral edema, mass effect, and brain compression.  There was no noted areas of abnormal enhancement.  Additional regions of susceptibility seen throughout consistent with regions of prior hemorrhage.      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  severe MR w/ flailed posterior mitral leaflet.  Atrial flutter was thought to be detected; per cardiology- likely artifact and does not appear to be atrial flutter.    NEURO:   -Neurologically without acute change , overall some improvement in right hemiparesis. Fluctuations in orientation possibly related to hospital delirium none the less infectious process being screened for, LLE shaking, likely secondary to Anxiety--low suspicion of seizure, will obtain routine spot EEG, ammonia wnl.   -CT head was repeated with no acute change and slight improved IV extension.   -Continue close monitoring for neurologic deterioration    - Stroke neuro checks q 2hr    - SBP goal 120-140mMHg for now, gradually titrated avoiding rapid fluctuations and hypotension; over BP< 160/90mmHg    -ANTITHROMBOTIC THERAPY: unless cardiac indication found, no acute neurological indication at this time.  If indicated will need to have ongoing evaluation for timeline as at this time risk of hemorrhage is increased.   -titrate statin based on outpatient ASCVD risk stratification, no cerebral ischemia at this time.  LDL:  96  -MRI Brain w/wo pending , suggest repeat in 4-6 weeks upon resolution of hemorrhage. If valve vegetation present then consideration in selective cerebral angiogram.   -Dysphagia screen: pass, advance diet as tolerated per protocol   -Physical therapy/OT/Speech eval/treatment.     CARDIOVASCULAR: Severe MR  - TTE  , EF 55-60% with severely dilated left atrium,  LVH, severely dilated LA, mobile mitral valve posterior leaflet possibly ruptured chordae but can not exclude vegetation, cardiac monitoring w/ telemetry for now, further evaluation pending findings of noted workup, consideration in elective cardiac monitoring   -ANTONIO  6. Flailed posterior mitral leaflet (P3 scallop) with severe eccentric mitral regurgitation, jet is eccentric anteriorly directed, flailed gap of 0.46 cm.   7. Patent foramen ovale with left to right shunting by color flow Doppler.  ejection fraction visually estimated at 60 to 65 %.  -cardiology follow up for long term plan re: MV.    -No acute neurological indication for PFO closure, non ischemic stroke and underlying hemorrhage/possible cerebral amyloid angiopathy.                             HEMATOLOGY:  -H/H without acute change, Platelets 203, patient should have all age and risk appropriate malignancy screenings with PCP or sooner if clinically suspected   -DVT ppx: Heparin s.c [] LMWH [x]     PULMONARY:   -On room air, protecting airway, saturating well     RENAL:   -BUN/Cr within range, monitor urine output, maintain adequate hydration    -Na Goal:  135-145  -Gallegos: N     ID:   -afebrile, no leukocytosis, monitor for si/sx of infection , blood cx x 2 in setting of r/o vegetation NGTD, UA neg nitrite/LE/bacteria.     OTHER:    -condition and plan of care d/w patient and son at bedside, questions and concerns addressed.   -TSH slightly elevated, suggest close follow up serial monitoring out of acute phase.  T3/4 within limits.     DISPOSITION:   Home once able.  Will d/w son.  Cannot go to Acute Rehab.        CORE MEASURES:        Admission NIHSS:      Tenecteplase : [] YES [x] NO      LDL/HDL/A1C: 96/48/5.8     Depression Screen- if depression hx and/or present      Statin Therapy: as noted      Dysphagia Screen: [x] PASS [] FAIL     Smoking [] YES [x] NO      Afib [] YES [x] NO     Stroke Education [x] YES [] NO 4/25/24    Obtain screening lower extremity venous ultrasound in patients who meet 1 or more of the following criteria as patient is high risk for DVT/PE on admission:   [] History of DVT/PE  []Hypercoagulable states (Factor V Leiden, Cancer, OCP, etc. )  []Prolonged immobility (hemiplegia/hemiparesis/post operative or any other extended immobilization)  [] Transferred from outside facility (Rehab or Long term care)  [] Age </= to 50

## 2024-04-30 NOTE — EEG REPORT - NS EEG TEXT BOX
SHASTA GALLEGO [id number]     Study Date: 		04-30-24  Duration:  x22 mins    --------------------------------------------------------------------------------------------------  History:  CC/ HPI Patient is a 58y old  Male who presents with a chief complaint of Left Thalamic IPH (30 Apr 2024 11:56)    MEDICATIONS  (STANDING):  amLODIPine   Tablet 7.5 milliGRAM(s) Oral daily  chlorhexidine 2% Cloths 1 Application(s) Topical daily  dextrose 10% Bolus 125 milliLiter(s) IV Bolus once  dextrose 5%. 1000 milliLiter(s) (100 mL/Hr) IV Continuous <Continuous>  dextrose 5%. 1000 milliLiter(s) (50 mL/Hr) IV Continuous <Continuous>  dextrose 50% Injectable 25 Gram(s) IV Push once  dextrose 50% Injectable 12.5 Gram(s) IV Push once  enoxaparin Injectable 40 milliGRAM(s) SubCutaneous <User Schedule>  glucagon  Injectable 1 milliGRAM(s) IntraMuscular once  insulin lispro (ADMELOG) corrective regimen sliding scale   SubCutaneous three times a day before meals  polyethylene glycol 3350 17 Gram(s) Oral daily  senna 2 Tablet(s) Oral at bedtime  sodium chloride 0.9%. 1000 milliLiter(s) (60 mL/Hr) IV Continuous <Continuous>    --------------------------------------------------------------------------------------------------  Study Interpretation:    [Abbreviation Key:  PDR=alpha rhythm/posterior dominant rhythm. A-P=anterior posterior.  Amplitude: ‘very low’:<20; ‘low’:20-49; ‘medium’:; ‘high’:>150uV.  Persistence for periodic/rhythmic patterns (% of epoch) ‘rare’:<1%; ‘occasional’:1-10%; ‘frequent’:10-50%; ‘abundant’:50-90%; ‘continuous’:>90%.  Persistence for sporadic discharges: ‘rare’:<1/hr; ‘occasional’:1/min-1/hr; ‘frequent’:>1/min; ‘abundant’:>1/10 sec.  RPP=rhythmic and periodic patterns; GRDA=generalized rhythmic delta activity; FIRDA=frontal intermittent GRDA; LRDA=lateralized rhythmic delta activity; TIRDA=temporal intermittent rhythmic delta activity;  LPD=PLED=lateralized periodic discharges; GPD=generalized periodic discharges; BIPDs =bilateral independent periodic discharges; Mf=multifocal; SIRPDs=stimulus induced rhythmic, periodic, or ictal appearing discharges; BIRDs=brief potentially ictal rhythmic discharges >4 Hz, lasting .5-10s; PFA (paroxysmal bursts >13 Hz or =8 Hz <10s).  Modifiers: +F=with fast component; +S=with spike component; +R=with rhythmic component.  S-B=burst suppression pattern.  Max=maximal. N1-drowsy; N2-stage II sleep; N3-slow wave sleep. SSS/BETS=small sharp spikes/benign epileptiform transients of sleep. HV=hyperventilation; PS=photic stimulation]    FINDINGS:      Background:  Continuity: continuous  Symmetry: symmetric  PDR: 8 Hz activity, with amplitude to 40 uV, that attenuated to eye opening.    Reactivity: present  Voltage: normal (between 20-150uV)  Anterior Posterior Gradient: present  Other background findings: none  Breach: absent    Background Slowing:  Generalized slowing: intermittent irregular delta and theta activity.  Focal slowing: intermittent left frontotemporal irregular delta was present.    State Changes:   -Drowsiness noted with increased slowing, attenuation of fast activity, vertex transients.  -Present with N2 sleep transients with symmetric spindles and K-complexes.    Sporadic Epileptiform Discharges:    None    Rhythmic and Periodic Patterns (RPPs):  None     Electrographic and Electroclinical seizures:  None    Other Clinical Events:  On and off periods of lower extremity shaking noted at times on right side, at times on left, without any correlating abnormality on EEG recording.     Activation Procedures:   -Hyperventilation was not performed.     -Photic stimulation was performed and did not elicit any abnormalities.       Artifacts:  Intermittent myogenic and movement artifacts were noted.    ECG:  The heart rate on single channel ECG was predominantly between 70-80 BPM.    EEG Classification / Summary:  Abnormal EEG study  Focal intermittent left frontotemporal slowing.  Mild generalized background slowing.  Periods of lower extremity shaking noted, without any correlating abnormality on EEG recording.    -----------------------------------------------------------------------------------------------------    Clinical Impression:  Focal cerebral dysfunction in left frontotemporal   Mild diffuse/multi-focal cerebral dysfunction, not specific as to etiology.  There were no epileptiform abnormalities recorded.    Periods of lower extremity shaking, likely non epileptic, clinical correlation recommended.  This is a preliminary report pending attending review and attestation.    Harpreet Sierra MD  Fellow, [facility]      -------------------------------------------------------------------------------------------------------  [facility] EEG Reading Room Ph#: [phone]  Epilepsy Answering Service after 5PM and before 8:30AM: Ph#: [phone]   SHASTA GALLEGO [id number]     Study Date: 		04-30-24  Duration:  x22 mins    --------------------------------------------------------------------------------------------------  History:  CC/ HPI Patient is a 58y old  Male who presents with a chief complaint of Left Thalamic IPH (30 Apr 2024 11:56)    MEDICATIONS  (STANDING):  amLODIPine   Tablet 7.5 milliGRAM(s) Oral daily  chlorhexidine 2% Cloths 1 Application(s) Topical daily  dextrose 10% Bolus 125 milliLiter(s) IV Bolus once  dextrose 5%. 1000 milliLiter(s) (100 mL/Hr) IV Continuous <Continuous>  dextrose 5%. 1000 milliLiter(s) (50 mL/Hr) IV Continuous <Continuous>  dextrose 50% Injectable 25 Gram(s) IV Push once  dextrose 50% Injectable 12.5 Gram(s) IV Push once  enoxaparin Injectable 40 milliGRAM(s) SubCutaneous <User Schedule>  glucagon  Injectable 1 milliGRAM(s) IntraMuscular once  insulin lispro (ADMELOG) corrective regimen sliding scale   SubCutaneous three times a day before meals  polyethylene glycol 3350 17 Gram(s) Oral daily  senna 2 Tablet(s) Oral at bedtime  sodium chloride 0.9%. 1000 milliLiter(s) (60 mL/Hr) IV Continuous <Continuous>    --------------------------------------------------------------------------------------------------  Study Interpretation:    [Abbreviation Key:  PDR=alpha rhythm/posterior dominant rhythm. A-P=anterior posterior.  Amplitude: ‘very low’:<20; ‘low’:20-49; ‘medium’:; ‘high’:>150uV.  Persistence for periodic/rhythmic patterns (% of epoch) ‘rare’:<1%; ‘occasional’:1-10%; ‘frequent’:10-50%; ‘abundant’:50-90%; ‘continuous’:>90%.  Persistence for sporadic discharges: ‘rare’:<1/hr; ‘occasional’:1/min-1/hr; ‘frequent’:>1/min; ‘abundant’:>1/10 sec.  RPP=rhythmic and periodic patterns; GRDA=generalized rhythmic delta activity; FIRDA=frontal intermittent GRDA; LRDA=lateralized rhythmic delta activity; TIRDA=temporal intermittent rhythmic delta activity;  LPD=PLED=lateralized periodic discharges; GPD=generalized periodic discharges; BIPDs =bilateral independent periodic discharges; Mf=multifocal; SIRPDs=stimulus induced rhythmic, periodic, or ictal appearing discharges; BIRDs=brief potentially ictal rhythmic discharges >4 Hz, lasting .5-10s; PFA (paroxysmal bursts >13 Hz or =8 Hz <10s).  Modifiers: +F=with fast component; +S=with spike component; +R=with rhythmic component.  S-B=burst suppression pattern.  Max=maximal. N1-drowsy; N2-stage II sleep; N3-slow wave sleep. SSS/BETS=small sharp spikes/benign epileptiform transients of sleep. HV=hyperventilation; PS=photic stimulation]    FINDINGS:      Background:  Continuity: continuous  Symmetry: symmetric  PDR: 8 Hz activity, with amplitude to 40 uV, that attenuated to eye opening.    Reactivity: present  Voltage: normal (between 20-150uV)  Anterior Posterior Gradient: present  Other background findings: none  Breach: absent    Background Slowing:  Generalized slowing: intermittent irregular delta and theta activity.  Focal slowing: intermittent left frontotemporal irregular delta was present.    State Changes:   -Drowsiness noted with increased slowing, attenuation of fast activity, vertex transients.  -Present with N2 sleep transients with symmetric spindles and K-complexes.    Sporadic Epileptiform Discharges:    None    Rhythmic and Periodic Patterns (RPPs):  None     Electrographic and Electroclinical seizures:  None    Other Clinical Events:  On and off periods of lower extremity shaking noted at times on right side, at times on left, without any correlating abnormality on EEG recording.     Activation Procedures:   -Hyperventilation was not performed.     -Photic stimulation was performed and did not elicit any abnormalities.       Artifacts:  Intermittent myogenic and movement artifacts were noted.    ECG:  The heart rate on single channel ECG was predominantly between 70-80 BPM.    EEG Classification / Summary:  Abnormal EEG study  Focal intermittent left frontotemporal slowing.  Mild generalized background slowing.  Periods of lower extremity shaking noted, without any correlating abnormality on EEG recording.    -----------------------------------------------------------------------------------------------------    Clinical Impression:  Focal cerebral dysfunction in left frontotemporal   Mild diffuse/multi-focal cerebral dysfunction, not specific as to etiology.  There were no epileptiform abnormalities recorded.    Periods of lower extremity shaking, likely non epileptic, clinical correlation recommended.    Harpreet Sierra MD  Fellow, [facility]    Gurmeet Kennedy MD PhD  Director, Epilepsy Division, [facility]     -------------------------------------------------------------------------------------------------------  [facility] EEG Reading Room Ph#: [phone]  Epilepsy Answering Service after 5PM and before 8:30AM: Ph#: [phone]

## 2024-04-30 NOTE — PROGRESS NOTE ADULT - ASSESSMENT
Assessment:  58M with unknown PMH who presented to [facility] for R sided weakness and R facial droop, found to have L thalamic IPH. Patient underwent MRI which showed no underlying mass.     Plan:  - Discussed with Dr. Feldman  - No further neurosurgical needs at this time  - Further care per primary team

## 2024-04-30 NOTE — PROGRESS NOTE ADULT - SUBJECTIVE AND OBJECTIVE BOX
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 Patient LKW was about 0200 when he was headed into work this am. 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 EMS called. Patient came in as code stroke. Per son patient takes asa daily for his heart and is unsure of any other medications or medical history. Son reports patient had been complaining of HA for about 2-3weeks. In the ED /137. CTH showed LEFT thalamic IPH. NIHSS at time of examination was 17. Patient started on Cardene drip. MRs 0. ICH score 2. NIHHS= 17 (2024 05:12)      Interval history:  Patient seen and examined by neurosurgery team.  Patient admits to mild headache but otherwise has no acute complaints. Patient underwent MRI, results below.      Vital Signs Last 24 Hrs  T(C): 36.5 (2024 04:41), Max: 37.2 (2024 00:18)  T(F): 97.7 (2024 04:41), Max: 98.9 (2024 00:18)  HR: 76 (2024 10:00) (64 - 83)  BP: 139/77 (2024 10:00) (104/65 - 158/104)  BP(mean): 92 (2024 10:00) (92 - 118)  RR: 20 (2024 10:00) (17 - 22)  SpO2: 96% (2024 10:00) (95% - 100%)    Parameters below as of 2024 10:00  Patient On (Oxygen Delivery Method): room air      Physical Exam:  Constitutional: NAD, lying in bed  Neuro  * Mental Status:  GCS 15, awake, following commands, A&O x1 (name), mild dysarthria   * Cranial Nerves: R eye blind, L pupil 3mm reactive, mild R facial droop, no tongue deviation  * Motor: RUE 5/5, LUE 5/5, RLE 5/5, LLE 5/5, no drift or dysmetria  * Sensory: Sensation intact to light touch  * Reflexes: not assessed       LABS:                        15.5   9.59  )-----------( 203      ( 2024 05:12 )             44.5         137  |  102  |  14.1  ----------------------------<  100<H>  3.6   |  23.0  |  0.71    Ca    9.1      2024 05:12  Phos  3.6       Mg     2.1         TPro  7.0  /  Alb  4.0  /  TBili  0.7  /  DBili  x   /  AST  14  /  ALT  10  /  AlkPhos  69        Urinalysis Basic - ( 2024 11:20 )    Color: Yellow / Appearance: Clear / S.021 / pH: x  Gluc: x / Ketone: 15 mg/dL  / Bili: Negative / Urobili: 1.0 mg/dL   Blood: x / Protein: Trace mg/dL / Nitrite: Negative   Leuk Esterase: Negative / RBC: 4 /HPF / WBC 0 /HPF   Sq Epi: x / Non Sq Epi: 0 /HPF / Bacteria: Negative /HPF        CULTURES:  Culture Results:   No growth at 24 hours ( @ 11:02)  Culture Results:   No growth at 24 hours ( @ 10:59)        I&O:       Medications:    RADIOLOGY & ADDITIONAL STUDIES: Patient is a 58y old  Male who presents with a chief complaint of Left Thalamic IPH (30 Apr 2024 08:00)    HPI:  59 y/o male unknown pmhx aside from 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 Patient LKW was about 0200 when he was headed into work this am. 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 EMS called. Patient came in as code stroke. Per son patient takes asa daily for his heart and is unsure of any other medications or medical history. Son reports patient had been complaining of HA for about 2-3weeks. In the ED /137. CTH showed LEFT thalamic IPH. NIHSS at time of examination was 17. Patient started on Cardene drip. MRs 0. ICH score 2. NIHHS= 17 (25 Apr 2024 05:12)      Interval history:  Patient seen and examined by neurosurgery team.  Patient admits to mild headache but otherwise has no acute complaints. Patient underwent MRI, results below.      Vital Signs Last 24 Hrs  T(C): 36.5 (30 Apr 2024 04:41), Max: 37.2 (30 Apr 2024 00:18)  T(F): 97.7 (30 Apr 2024 04:41), Max: 98.9 (30 Apr 2024 00:18)  HR: 76 (30 Apr 2024 10:00) (64 - 83)  BP: 139/77 (30 Apr 2024 10:00) (104/65 - 158/104)  BP(mean): 92 (30 Apr 2024 10:00) (92 - 118)  RR: 20 (30 Apr 2024 10:00) (17 - 22)  SpO2: 96% (30 Apr 2024 10:00) (95% - 100%)    Parameters below as of 30 Apr 2024 10:00  Patient On (Oxygen Delivery Method): room air      Physical Exam:  Constitutional: NAD, lying in bed  Neuro  * Mental Status:  GCS 15, awake, following commands, A&O x1 (name), mild dysarthria   * Cranial Nerves: R eye blind, L pupil 3mm reactive, mild R facial droop, no tongue deviation  * Motor: RUE 5/5, LUE 5/5, RLE 5/5, LLE 5/5, no drift or dysmetria  * Sensory: Sensation intact to light touch  * Reflexes: not assessed       LABS:                        15.5   9.59  )-----------( 203      ( 30 Apr 2024 05:12 )             44.5     04-30    137  |  102  |  14.1  ----------------------------<  100<H>  3.6   |  23.0  |  0.71    Ca    9.1      30 Apr 2024 05:12  Phos  3.6     04-30  Mg     2.1     04-30    TPro  7.0  /  Alb  4.0  /  TBili  0.7  /  DBili  x   /  AST  14  /  ALT  10  /  AlkPhos  69  04-30      Medications:  MEDICATIONS  (STANDING):  amLODIPine   Tablet 7.5 milliGRAM(s) Oral daily  chlorhexidine 2% Cloths 1 Application(s) Topical daily  dextrose 10% Bolus 125 milliLiter(s) IV Bolus once  dextrose 5%. 1000 milliLiter(s) (50 mL/Hr) IV Continuous <Continuous>  dextrose 5%. 1000 milliLiter(s) (100 mL/Hr) IV Continuous <Continuous>  dextrose 50% Injectable 12.5 Gram(s) IV Push once  dextrose 50% Injectable 25 Gram(s) IV Push once  enoxaparin Injectable 40 milliGRAM(s) SubCutaneous <User Schedule>  glucagon  Injectable 1 milliGRAM(s) IntraMuscular once  insulin lispro (ADMELOG) corrective regimen sliding scale   SubCutaneous three times a day before meals  polyethylene glycol 3350 17 Gram(s) Oral daily  senna 2 Tablet(s) Oral at bedtime  sodium chloride 0.9%. 1000 milliLiter(s) (60 mL/Hr) IV Continuous <Continuous>    MEDICATIONS  (PRN):  dextrose Oral Gel 15 Gram(s) Oral once PRN Blood Glucose LESS THAN 70 milliGRAM(s)/deciliter  hydrALAZINE Injectable 10 milliGRAM(s) IV Push every 2 hours PRN SBP> 160  labetalol Injectable 10 milliGRAM(s) IV Push every 2 hours PRN SBP> 160      RADIOLOGY & ADDITIONAL STUDIES:  < from: MR Head w/wo IV Cont (04.30.24 @ 08:34) >  IMPRESSION: Parenchymal hemorrhage again seen.    Numerous areas of abnormal susceptibility as described above.    --- End of Report ---  CRISTAL GALLEGOS MD; Attending Radiologist  This document has been electronically signed. Apr 30 2024  9:37AM    < end of copied text >

## 2024-04-30 NOTE — PROGRESS NOTE ADULT - NSPROGADDITIONALINFOA_GEN_ALL_CORE
NSGY Attg:    see above    patient seen and examined by PA staff    MRI reviewed    agree with plan as above

## 2024-04-30 NOTE — PROGRESS NOTE ADULT - SUBJECTIVE AND OBJECTIVE BOX
Preliminary note, official recommendations pending attending review/signature   Seen and examined by Stroke team attending/team, assessment/ plan as discussed with stroke team attending/team as noted.     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  Per notes, was heading into work and his co-workers noted he suddenly stopped working and had a facial droop.  Initial /137. CTH showed LEFT thalamic IPH. Patient started on Cardene drip and sent to Neuro ICU. Now on stroke service for further workup and evaluation.      SUBJECTIVE: No events overnight.  No new neurologic complaints.  ROS reported negative unless otherwise noted.    amLODIPine   Tablet 7.5 milliGRAM(s) Oral daily  chlorhexidine 2% Cloths 1 Application(s) Topical daily  dextrose 10% Bolus 125 milliLiter(s) IV Bolus once  dextrose 5%. 1000 milliLiter(s) IV Continuous <Continuous>  dextrose 5%. 1000 milliLiter(s) IV Continuous <Continuous>  dextrose 50% Injectable 25 Gram(s) IV Push once  dextrose 50% Injectable 12.5 Gram(s) IV Push once  dextrose Oral Gel 15 Gram(s) Oral once PRN  enoxaparin Injectable 40 milliGRAM(s) SubCutaneous <User Schedule>  glucagon  Injectable 1 milliGRAM(s) IntraMuscular once  hydrALAZINE Injectable 10 milliGRAM(s) IV Push every 2 hours PRN  insulin lispro (ADMELOG) corrective regimen sliding scale   SubCutaneous three times a day before meals  labetalol Injectable 10 milliGRAM(s) IV Push every 2 hours PRN  polyethylene glycol 3350 17 Gram(s) Oral daily  senna 2 Tablet(s) Oral at bedtime  sodium chloride 0.9%. 1000 milliLiter(s) IV Continuous <Continuous>      PHYSICAL EXAM:   Vital Signs Last 24 Hrs  T(C): 36.5 (30 Apr 2024 04:41), Max: 37.2 (30 Apr 2024 00:18)  T(F): 97.7 (30 Apr 2024 04:41), Max: 98.9 (30 Apr 2024 00:18)  HR: 69 (30 Apr 2024 06:00) (64 - 89)  BP: 128/87 (30 Apr 2024 06:00) (104/65 - 163/101)  BP(mean): 97 (30 Apr 2024 06:00) (97 - 118)  RR: 20 (30 Apr 2024 06:00) (17 - 22)  SpO2: 95% (30 Apr 2024 06:00) (95% - 100%)    Parameters below as of 30 Apr 2024 06:00  Patient On (Oxygen Delivery Method): room air    EXAM PENDING        General: No acute distress.   HEENT: right eye opacified, Head normocephalic, atraumatic. Conjunctivae clear w/o exudates or hemorrhage.  Respiratory: Chest wall symmetric, nontender. no use of accessory muscles, no audible wheezes or rhonchi  Abdominal: Soft, nondistended, nontender. + BDS/4 quad.   Skin: Skin is warm, dry and intact without rashes or lesions.   Extremities: No edema.     Detailed Neurologic Exam:    Mental status: The patient is awake in bed, oriented to self and place, recalls he is having testing, IDs objects, following simple commands and cross midline with slight prompting.     Cranial nerves: Pupils equal and react symmetrically to light. R eye blind and opacified. Extraocular motion is full with no nystagmus. There is no ptosis. R facial palsy , Palate elevates symmetrically. Tongue is midline.    Motor: There is normal bulk and tone.  There is no tremor.  Strength is 4/5 in the right arm, 4+/5 in right leg    Strength is 5/5 in the left arm and leg. Intermittent shaking of left leg, can stop on command    Sensation: Intact to light touch in 4 extremities, no extinction     Cerebellar:  no gross ataxia appreciated, in proportion to weakness     Gait : deferred    LABS:                        15.5   9.59  )-----------( 203      ( 30 Apr 2024 05:12 )             44.5    04-30    137  |  102  |  14.1  ----------------------------<  100<H>  3.6   |  23.0  |  0.71    Ca    9.1      30 Apr 2024 05:12  Phos  3.6     04-30  Mg     2.1     04-30    TPro  7.0  /  Alb  4.0  /  TBili  0.7  /  DBili  x   /  AST  14  /  ALT  10  /  AlkPhos  69  04-30        04-25 Chol 156 LDL -96- HDL 48 Trig 59    A1C: 5.6    ECHO:  ANTONIO W or WO Ultrasound Enhancing Agent (04.29.24 @ 13:05)    1. Left ventricular systolic function is normal with an ejection fraction visually estimated at 60 to 65 %.   2. Mild left ventricular hypertrophy.   3. Normal right ventricular cavity size and normal systolic function, inadequate estimation of RVSP.   4. The left atrium is moderately dilated.   5. No evidence of left atrial appendage thrombus. The left atrial appendage emptying velocity is normal.   6. Flailed posterior mitral leaflet (P3 scallop) with severe eccentric mitral regurgitation, jet is eccentric anteriorly directed, flailed gap of 0.46 cm.   7. Patent foramen ovale with left to right shunting by color flowDoppler.   8. Mild simple atheromas at the aortic arch.   9. Trace pericardial effusion.  10. Compared to the transthoracic echocardiogram performed on 4/26/2024, no evidence of vegetation or intracardiac abscess, mobile mitral valve structure due to flailed posterior mitral leaflet. .    TTE W or WO Ultrasound Enhancing Agent (04.26.24 @ 12:43)   CONCLUSIONS:    1. Left ventricular cavity is normal in size. Left ventricular systolic function is normal with an ejection fraction visually estimated at 55 to 60 %.   2. Severe asymmetric left ventricular hypertrophy.   3. Normal right ventricular cavity size and normal systolic function.   4. The left atrium is severely dilated.   5. The right atrium is normal in size.   6. Thickened mitral valve leaflets.   7. Mobile structure noted on the posterior mitral valve leaflet may represent ruptured chordae. Cannot rule out vegetation.   8. Severe mitral regurgitation.   9. Trileaflet aortic valve with normal systolic excursion.  10. Mild aortic regurgitation.  11. Mild pulmonic regurgitation.  12. No pericardial effusion seen.  13. Estimated pulmonary artery systolic pressure is 59 mmHg, consistent with moderate-to-severe pulmonary hypertension.    IMAGING: Reviewed by me.     Neuro-Imaging     CT Head No Cont (04.26.24 @ 06:16)   IMPRESSION:  Left thalamocapsular hemorrhage is stable    CT Head No Cont (04.25.24 @ 10:31)   1.  Grossly stable left thalamic hemorrhage.  2.  Midline shift to the right of approximately 0.4 cm.  3.  Chronic ischemic changes.  4.  Redemonstrated partially calcified masslike density in the right   nasal passages/ethmoid air cells, grossly stable.    CT Angio Neck Stroke Protocol w/ IV Cont (04.25.24 @ 04:26)   CTA NECK:  No significant stenosis of the cervical carotid arteries based   on NASCET criteria. Patent cervical vertebral arteries. No evidence of   cervical carotid or vertebral artery dissection.    Polypoid cystic and partially calcified mass in the right nasal passage   with protrudes into the knee nasopharynx and involves the right ethmoid   air cells. Near complete opacification of the right frontal sinus, right   ethmoid air cells, and right sphenoid sinus. Direct endoscopic   visualization is recommended. Correlation with outside prior imaging   would be helpful.    Mild pulmonary interstitial lung edema and small bilateral pleural   effusions.    CTA HEAD:  No high-grade stenosis or occlusion of the major proximal   arterial branches, however, short segment severe stenosis/occlusion of   the right posterior cerebral artery proximal P3 branch. No evidence of an   intracranial arterial aneurysm or arteriovenous malformation on CTA,   specifically, no evidence of vascular malformation in the region of the   left thalamic hemorrhage. No evidence of active extravasation of contrast   within the left thalamic parenchymal hemorrhage.    ULTRASOUND    US Duplex Venous Lower Ext Complete, Bilateral (04.28.24 @ 15:58)   No acute DVT of the lower extremities.       Preliminary note, official recommendations pending attending review/signature   Seen and examined by Stroke team attending/team, assessment/ plan as discussed with stroke team attending/team as noted.    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  Per notes, was heading into work and his co-workers noted he suddenly stopped working and had a facial droop.  Initial /137. CTH showed LEFT thalamic IPH. Patient started on Cardene drip and sent to Neuro ICU. Now on stroke service for further workup and evaluation.      SUBJECTIVE: No events overnight. No headache or dizziness, no double vision. Right sided vision better.  No new neurologic complaints.  ROS reported negative unless otherwise noted.    amLODIPine   Tablet 7.5 milliGRAM(s) Oral daily  chlorhexidine 2% Cloths 1 Application(s) Topical daily  dextrose 10% Bolus 125 milliLiter(s) IV Bolus once  dextrose 5%. 1000 milliLiter(s) IV Continuous <Continuous>  dextrose 5%. 1000 milliLiter(s) IV Continuous <Continuous>  dextrose 50% Injectable 25 Gram(s) IV Push once  dextrose 50% Injectable 12.5 Gram(s) IV Push once  dextrose Oral Gel 15 Gram(s) Oral once PRN  enoxaparin Injectable 40 milliGRAM(s) SubCutaneous <User Schedule>  glucagon  Injectable 1 milliGRAM(s) IntraMuscular once  hydrALAZINE Injectable 10 milliGRAM(s) IV Push every 2 hours PRN  insulin lispro (ADMELOG) corrective regimen sliding scale   SubCutaneous three times a day before meals  labetalol Injectable 10 milliGRAM(s) IV Push every 2 hours PRN  polyethylene glycol 3350 17 Gram(s) Oral daily  senna 2 Tablet(s) Oral at bedtime  sodium chloride 0.9%. 1000 milliLiter(s) IV Continuous <Continuous>      PHYSICAL EXAM:   Vital Signs Last 24 Hrs  T(C): 36.5 (30 Apr 2024 04:41), Max: 37.2 (30 Apr 2024 00:18)  T(F): 97.7 (30 Apr 2024 04:41), Max: 98.9 (30 Apr 2024 00:18)  HR: 69 (30 Apr 2024 06:00) (64 - 89)  BP: 128/87 (30 Apr 2024 06:00) (104/65 - 163/101)  BP(mean): 97 (30 Apr 2024 06:00) (97 - 118)  RR: 20 (30 Apr 2024 06:00) (17 - 22)  SpO2: 95% (30 Apr 2024 06:00) (95% - 100%)    Parameters below as of 30 Apr 2024 06:00  Patient On (Oxygen Delivery Method): room air       General: No acute distress.   HEENT: right eye opacified, Head normocephalic, atraumatic. Conjunctivae clear w/o exudates or hemorrhage.  Respiratory: Chest wall symmetric, nontender. no use of accessory muscles, no audible wheezes or rhonchi  Abdominal: Soft, nondistended, nontender. + BDS/4 quad.   Skin: Skin is warm, dry and intact without rashes or lesions.   Extremities: No edema.     Detailed Neurologic Exam:    Mental status: The patient is awake in bed, oriented to self  only- states January, does recall April when reoriented,  IDs objects, following simple commands and cross midline with some hesitation, conversant relative to conversation.      Cranial nerves: Pupils equal and react symmetrically to light. R eye blind and opacified. Extraocular motion is full with no nystagmus. There is no ptosis. mild R facial palsy , Palate elevates symmetrically. Tongue is midline.    Motor: There is normal bulk and tone.  There is no tremor.  Strength is 4+/5 in the right arm, 4+/5 in right leg    Strength is 5/5 in the left arm and leg. Intermittent shaking of left leg, can stop on command     Sensation: Intact to light touch in 4 extremities, no extinction     Cerebellar:  no gross ataxia appreciated, in proportion to weakness     Gait : deferred    LABS:                        15.5   9.59  )-----------( 203      ( 30 Apr 2024 05:12 )             44.5    04-30    137  |  102  |  14.1  ----------------------------<  100<H>  3.6   |  23.0  |  0.71    Ca    9.1      30 Apr 2024 05:12  Phos  3.6     04-30  Mg     2.1     04-30    TPro  7.0  /  Alb  4.0  /  TBili  0.7  /  DBili  x   /  AST  14  /  ALT  10  /  AlkPhos  69  04-30        04-25 Chol 156 LDL -96- HDL 48 Trig 59    A1C: 5.6    ECHO:  ANTONIO W or WO Ultrasound Enhancing Agent (04.29.24 @ 13:05)    1. Left ventricular systolic function is normal with an ejection fraction visually estimated at 60 to 65 %.   2. Mild left ventricular hypertrophy.   3. Normal right ventricular cavity size and normal systolic function, inadequate estimation of RVSP.   4. The left atrium is moderately dilated.   5. No evidence of left atrial appendage thrombus. The left atrial appendage emptying velocity is normal.   6. Flailed posterior mitral leaflet (P3 scallop) with severe eccentric mitral regurgitation, jet is eccentric anteriorly directed, flailed gap of 0.46 cm.   7. Patent foramen ovale with left to right shunting by color flowDoppler.   8. Mild simple atheromas at the aortic arch.   9. Trace pericardial effusion.  10. Compared to the transthoracic echocardiogram performed on 4/26/2024, no evidence of vegetation or intracardiac abscess, mobile mitral valve structure due to flailed posterior mitral leaflet. .    TTE W or WO Ultrasound Enhancing Agent (04.26.24 @ 12:43)   CONCLUSIONS:    1. Left ventricular cavity is normal in size. Left ventricular systolic function is normal with an ejection fraction visually estimated at 55 to 60 %.   2. Severe asymmetric left ventricular hypertrophy.   3. Normal right ventricular cavity size and normal systolic function.   4. The left atrium is severely dilated.   5. The right atrium is normal in size.   6. Thickened mitral valve leaflets.   7. Mobile structure noted on the posterior mitral valve leaflet may represent ruptured chordae. Cannot rule out vegetation.   8. Severe mitral regurgitation.   9. Trileaflet aortic valve with normal systolic excursion.  10. Mild aortic regurgitation.  11. Mild pulmonic regurgitation.  12. No pericardial effusion seen.  13. Estimated pulmonary artery systolic pressure is 59 mmHg, consistent with moderate-to-severe pulmonary hypertension.    IMAGING: Reviewed by me.     Neuro-Imaging    MR Head w/wo IV Cont (04.30.24 @ 08:34)   Extensive parenchymal volume loss and chronic microvessel ischemic   changes are identified  There is evidence of abnormal signal involving the left thalamus/corona   radiata/basal ganglia region. This corresponds acute parenchymal   hemorrhage seen on the prior head CT. Surrounding edema is identified No   definite areas of abnormal enhancement is seen though continued close   interval is recommended if underlying lesion is still suspected. This   area of hemorrhage measures approximately 3.1 x 1.9 cm  Numerous areas of abnormal susceptibility are seen throughout the   posterior fossa and supratentorial region. This finding could be   compatible with areas of old hemorrhage, possibly secondary to amyloid   angiopathy though the possibility of areas of mineralization or small   cavernous angiomas must be considered.  Evaluation of the diffusion weighted sequence demonstrates no abnormal   areas of restricted diffusion to suggest acute infarct.  The large vessels demonstrate normal flow voids  Opacification of the right sphenoid and right ethmoid sinus is identified   mucosal thickening. Similar finding is seen involving the right frontal   sinus. Air-fluid level seen involving the right maxillary sinus . Minimal   mucosal considered in the left maxillary sinus.  Mild inflammatory change involving right mastoid region.    IMPRESSION: Parenchymal hemorrhage again seen.  Numerous areas of abnormal susceptibility as described above.    CT Head No Cont (04.26.24 @ 06:16)   IMPRESSION:  Left thalamocapsular hemorrhage is stable    CT Head No Cont (04.25.24 @ 10:31)   1.  Grossly stable left thalamic hemorrhage.  2.  Midline shift to the right of approximately 0.4 cm.  3.  Chronic ischemic changes.  4.  Redemonstrated partially calcified masslike density in the right   nasal passages/ethmoid air cells, grossly stable.    CT Angio Neck Stroke Protocol w/ IV Cont (04.25.24 @ 04:26)   CTA NECK:  No significant stenosis of the cervical carotid arteries based   on NASCET criteria. Patent cervical vertebral arteries. No evidence of   cervical carotid or vertebral artery dissection.    Polypoid cystic and partially calcified mass in the right nasal passage   with protrudes into the knee nasopharynx and involves the right ethmoid   air cells. Near complete opacification of the right frontal sinus, right   ethmoid air cells, and right sphenoid sinus. Direct endoscopic   visualization is recommended. Correlation with outside prior imaging   would be helpful.    Mild pulmonary interstitial lung edema and small bilateral pleural   effusions.    CTA HEAD:  No high-grade stenosis or occlusion of the major proximal   arterial branches, however, short segment severe stenosis/occlusion of   the right posterior cerebral artery proximal P3 branch. No evidence of an   intracranial arterial aneurysm or arteriovenous malformation on CTA,   specifically, no evidence of vascular malformation in the region of the   left thalamic hemorrhage. No evidence of active extravasation of contrast   within the left thalamic parenchymal hemorrhage.    ULTRASOUND    US Duplex Venous Lower Ext Complete, Bilateral (04.28.24 @ 15:58)   No acute DVT of the lower extremities.       Preliminary note, official recommendations pending attending review/signature   Seen and examined by Stroke team attending/team, assessment/ plan as discussed with stroke team attending/team as noted.    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  Per notes, was heading into work and his co-workers noted he suddenly stopped working and had a facial droop.  Initial /137. CTH showed LEFT thalamic IPH. Patient started on Cardene drip and sent to Neuro ICU. Now on stroke service for further workup and evaluation.      SUBJECTIVE: Noted again overnight with some disorientation.  No new neurologic complaints.  ROS reported negative unless otherwise noted.    amLODIPine   Tablet 7.5 milliGRAM(s) Oral daily  chlorhexidine 2% Cloths 1 Application(s) Topical daily  dextrose 10% Bolus 125 milliLiter(s) IV Bolus once  dextrose 5%. 1000 milliLiter(s) IV Continuous <Continuous>  dextrose 5%. 1000 milliLiter(s) IV Continuous <Continuous>  dextrose 50% Injectable 25 Gram(s) IV Push once  dextrose 50% Injectable 12.5 Gram(s) IV Push once  dextrose Oral Gel 15 Gram(s) Oral once PRN  enoxaparin Injectable 40 milliGRAM(s) SubCutaneous <User Schedule>  glucagon  Injectable 1 milliGRAM(s) IntraMuscular once  hydrALAZINE Injectable 10 milliGRAM(s) IV Push every 2 hours PRN  insulin lispro (ADMELOG) corrective regimen sliding scale   SubCutaneous three times a day before meals  labetalol Injectable 10 milliGRAM(s) IV Push every 2 hours PRN  polyethylene glycol 3350 17 Gram(s) Oral daily  senna 2 Tablet(s) Oral at bedtime  sodium chloride 0.9%. 1000 milliLiter(s) IV Continuous <Continuous>      PHYSICAL EXAM:   Vital Signs Last 24 Hrs  T(C): 36.5 (30 Apr 2024 04:41), Max: 37.2 (30 Apr 2024 00:18)  T(F): 97.7 (30 Apr 2024 04:41), Max: 98.9 (30 Apr 2024 00:18)  HR: 69 (30 Apr 2024 06:00) (64 - 89)  BP: 128/87 (30 Apr 2024 06:00) (104/65 - 163/101)  BP(mean): 97 (30 Apr 2024 06:00) (97 - 118)  RR: 20 (30 Apr 2024 06:00) (17 - 22)  SpO2: 95% (30 Apr 2024 06:00) (95% - 100%)    Parameters below as of 30 Apr 2024 06:00  Patient On (Oxygen Delivery Method): room air       General: No acute distress.   HEENT: right eye opacified, Head normocephalic, atraumatic. Conjunctivae clear w/o exudates or hemorrhage.  Respiratory: Chest wall symmetric, nontender. no use of accessory muscles, no audible wheezes or rhonchi  Abdominal: Soft, nondistended, nontender. + BDS/4 quad.   Skin: Skin is warm, dry and intact without rashes or lesions.   Extremities: No edema.     Detailed Neurologic Exam:    Mental status: The patient is awake in bed, oriented to self  only- states January, does recall April when reoriented,  IDs objects, following simple commands and cross midline with some hesitation, conversant relative to conversation.      Cranial nerves: Pupils equal and react symmetrically to light. R eye blind and opacified. Extraocular motion is full with no nystagmus. There is no ptosis. mild R facial palsy , Palate elevates symmetrically. Tongue is midline.    Motor: There is normal bulk and tone.  There is no tremor.  Strength is 4+/5 in the right arm, 4+/5 in right leg    Strength is 5/5 in the left arm and leg. Intermittent shaking of left leg, can stop on command     Sensation: Intact to light touch in 4 extremities, no extinction     Cerebellar:  no gross ataxia appreciated, in proportion to weakness     Gait : deferred    LABS:                        15.5   9.59  )-----------( 203      ( 30 Apr 2024 05:12 )             44.5    04-30    137  |  102  |  14.1  ----------------------------<  100<H>  3.6   |  23.0  |  0.71    Ca    9.1      30 Apr 2024 05:12  Phos  3.6     04-30  Mg     2.1     04-30    TPro  7.0  /  Alb  4.0  /  TBili  0.7  /  DBili  x   /  AST  14  /  ALT  10  /  AlkPhos  69  04-30        04-25 Chol 156 LDL -96- HDL 48 Trig 59    A1C: 5.6    ECHO:  ANTONIO W or WO Ultrasound Enhancing Agent (04.29.24 @ 13:05)    1. Left ventricular systolic function is normal with an ejection fraction visually estimated at 60 to 65 %.   2. Mild left ventricular hypertrophy.   3. Normal right ventricular cavity size and normal systolic function, inadequate estimation of RVSP.   4. The left atrium is moderately dilated.   5. No evidence of left atrial appendage thrombus. The left atrial appendage emptying velocity is normal.   6. Flailed posterior mitral leaflet (P3 scallop) with severe eccentric mitral regurgitation, jet is eccentric anteriorly directed, flailed gap of 0.46 cm.   7. Patent foramen ovale with left to right shunting by color flowDoppler.   8. Mild simple atheromas at the aortic arch.   9. Trace pericardial effusion.  10. Compared to the transthoracic echocardiogram performed on 4/26/2024, no evidence of vegetation or intracardiac abscess, mobile mitral valve structure due to flailed posterior mitral leaflet. .    TTE W or WO Ultrasound Enhancing Agent (04.26.24 @ 12:43)   CONCLUSIONS:    1. Left ventricular cavity is normal in size. Left ventricular systolic function is normal with an ejection fraction visually estimated at 55 to 60 %.   2. Severe asymmetric left ventricular hypertrophy.   3. Normal right ventricular cavity size and normal systolic function.   4. The left atrium is severely dilated.   5. The right atrium is normal in size.   6. Thickened mitral valve leaflets.   7. Mobile structure noted on the posterior mitral valve leaflet may represent ruptured chordae. Cannot rule out vegetation.   8. Severe mitral regurgitation.   9. Trileaflet aortic valve with normal systolic excursion.  10. Mild aortic regurgitation.  11. Mild pulmonic regurgitation.  12. No pericardial effusion seen.  13. Estimated pulmonary artery systolic pressure is 59 mmHg, consistent with moderate-to-severe pulmonary hypertension.    IMAGING: Reviewed by me.     Neuro-Imaging    MR Head w/wo IV Cont (04.30.24 @ 08:34)   Extensive parenchymal volume loss and chronic microvessel ischemic   changes are identified  There is evidence of abnormal signal involving the left thalamus/corona   radiata/basal ganglia region. This corresponds acute parenchymal   hemorrhage seen on the prior head CT. Surrounding edema is identified No   definite areas of abnormal enhancement is seen though continued close   interval is recommended if underlying lesion is still suspected. This   area of hemorrhage measures approximately 3.1 x 1.9 cm  Numerous areas of abnormal susceptibility are seen throughout the   posterior fossa and supratentorial region. This finding could be   compatible with areas of old hemorrhage, possibly secondary to amyloid   angiopathy though the possibility of areas of mineralization or small   cavernous angiomas must be considered.  Evaluation of the diffusion weighted sequence demonstrates no abnormal   areas of restricted diffusion to suggest acute infarct.  The large vessels demonstrate normal flow voids  Opacification of the right sphenoid and right ethmoid sinus is identified   mucosal thickening. Similar finding is seen involving the right frontal   sinus. Air-fluid level seen involving the right maxillary sinus . Minimal   mucosal considered in the left maxillary sinus.  Mild inflammatory change involving right mastoid region.    IMPRESSION: Parenchymal hemorrhage again seen.  Numerous areas of abnormal susceptibility as described above.    < from: CT Head No Cont (04.30.24 @ 01:23) >  Impression: Stable acute parenchymal hemorrhage with slightly decreased   intraventricular hemorrhage.    < end of copied text >      CT Head No Cont (04.26.24 @ 06:16)   IMPRESSION:  Left thalamocapsular hemorrhage is stable    CT Head No Cont (04.25.24 @ 10:31)   1.  Grossly stable left thalamic hemorrhage.  2.  Midline shift to the right of approximately 0.4 cm.  3.  Chronic ischemic changes.  4.  Redemonstrated partially calcified masslike density in the right   nasal passages/ethmoid air cells, grossly stable.    CT Angio Neck Stroke Protocol w/ IV Cont (04.25.24 @ 04:26)   CTA NECK:  No significant stenosis of the cervical carotid arteries based   on NASCET criteria. Patent cervical vertebral arteries. No evidence of   cervical carotid or vertebral artery dissection.    Polypoid cystic and partially calcified mass in the right nasal passage   with protrudes into the knee nasopharynx and involves the right ethmoid   air cells. Near complete opacification of the right frontal sinus, right   ethmoid air cells, and right sphenoid sinus. Direct endoscopic   visualization is recommended. Correlation with outside prior imaging   would be helpful.    Mild pulmonary interstitial lung edema and small bilateral pleural   effusions.    CTA HEAD:  No high-grade stenosis or occlusion of the major proximal   arterial branches, however, short segment severe stenosis/occlusion of   the right posterior cerebral artery proximal P3 branch. No evidence of an   intracranial arterial aneurysm or arteriovenous malformation on CTA,   specifically, no evidence of vascular malformation in the region of the   left thalamic hemorrhage. No evidence of active extravasation of contrast   within the left thalamic parenchymal hemorrhage.    ULTRASOUND    US Duplex Venous Lower Ext Complete, Bilateral (04.28.24 @ 15:58)   No acute DVT of the lower extremities.       Preliminary note, official recommendations pending attending review/signature   Seen and examined by Stroke team attending/team, assessment/ plan as discussed with stroke team attending/team as noted.    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  Per notes, was heading into work and his co-workers noted he suddenly stopped working and had a facial droop.  Initial /137. CTH showed LEFT thalamic IPH. Patient started on Cardene drip and sent to Neuro ICU. Now on stroke service for further workup and evaluation.      SUBJECTIVE: Noted again overnight with some disorientation.  No new neurologic complaints.  ROS reported negative unless otherwise noted.    amLODIPine   Tablet 7.5 milliGRAM(s) Oral daily  chlorhexidine 2% Cloths 1 Application(s) Topical daily  dextrose 10% Bolus 125 milliLiter(s) IV Bolus once  dextrose 5%. 1000 milliLiter(s) IV Continuous <Continuous>  dextrose 5%. 1000 milliLiter(s) IV Continuous <Continuous>  dextrose 50% Injectable 25 Gram(s) IV Push once  dextrose 50% Injectable 12.5 Gram(s) IV Push once  dextrose Oral Gel 15 Gram(s) Oral once PRN  enoxaparin Injectable 40 milliGRAM(s) SubCutaneous <User Schedule>  glucagon  Injectable 1 milliGRAM(s) IntraMuscular once  hydrALAZINE Injectable 10 milliGRAM(s) IV Push every 2 hours PRN  insulin lispro (ADMELOG) corrective regimen sliding scale   SubCutaneous three times a day before meals  labetalol Injectable 10 milliGRAM(s) IV Push every 2 hours PRN  polyethylene glycol 3350 17 Gram(s) Oral daily  senna 2 Tablet(s) Oral at bedtime  sodium chloride 0.9%. 1000 milliLiter(s) IV Continuous <Continuous>      PHYSICAL EXAM:   Vital Signs Last 24 Hrs  T(C): 36.5 (30 Apr 2024 04:41), Max: 37.2 (30 Apr 2024 00:18)  T(F): 97.7 (30 Apr 2024 04:41), Max: 98.9 (30 Apr 2024 00:18)  HR: 69 (30 Apr 2024 06:00) (64 - 89)  BP: 128/87 (30 Apr 2024 06:00) (104/65 - 163/101)  BP(mean): 97 (30 Apr 2024 06:00) (97 - 118)  RR: 20 (30 Apr 2024 06:00) (17 - 22)  SpO2: 95% (30 Apr 2024 06:00) (95% - 100%)    Parameters below as of 30 Apr 2024 06:00  Patient On (Oxygen Delivery Method): room air       General: No acute distress.   HEENT: right eye opacified, Head normocephalic, atraumatic. Conjunctivae clear w/o exudates or hemorrhage.  Respiratory: Chest wall symmetric, nontender. no use of accessory muscles, no audible wheezes or rhonchi  Abdominal: Soft, nondistended, nontender. + BDS/4 quad.   Skin: Skin is warm, dry and intact without rashes or lesions.   Extremities: No edema.     Detailed Neurologic Exam:    Mental status: The patient is awake in bed, oriented to self  only- states January, does recall April when reoriented,  IDs objects, following simple commands and cross midline with some hesitation, conversant relative to conversation.      Cranial nerves: Pupils equal and react symmetrically to light. R eye blind and opacified. Extraocular motion is full with no nystagmus. There is no ptosis. mild R facial palsy , Palate elevates symmetrically. Tongue is midline.    Motor: There is normal bulk and tone.  There is no tremor.  Strength is 4+/5 in the right arm, 4+/5 in right leg    Strength is 5/5 in the left arm and leg. Intermittent shaking of left leg, can stop on command     Sensation: Intact to light touch in 4 extremities, no extinction     Cerebellar:  no gross ataxia appreciated, in proportion to weakness     Gait : deferred    LABS:                        15.5   9.59  )-----------( 203      ( 30 Apr 2024 05:12 )             44.5    04-30    137  |  102  |  14.1  ----------------------------<  100<H>  3.6   |  23.0  |  0.71    Ca    9.1      30 Apr 2024 05:12  Phos  3.6     04-30  Mg     2.1     04-30    TPro  7.0  /  Alb  4.0  /  TBili  0.7  /  DBili  x   /  AST  14  /  ALT  10  /  AlkPhos  69  04-30        04-25 Chol 156 LDL -96- HDL 48 Trig 59    A1C: 5.6    ECHO:  ANTONIO W or WO Ultrasound Enhancing Agent (04.29.24 @ 13:05)    1. Left ventricular systolic function is normal with an ejection fraction visually estimated at 60 to 65 %.   2. Mild left ventricular hypertrophy.   3. Normal right ventricular cavity size and normal systolic function, inadequate estimation of RVSP.   4. The left atrium is moderately dilated.   5. No evidence of left atrial appendage thrombus. The left atrial appendage emptying velocity is normal.   6. Flailed posterior mitral leaflet (P3 scallop) with severe eccentric mitral regurgitation, jet is eccentric anteriorly directed, flailed gap of 0.46 cm.   7. Patent foramen ovale with left to right shunting by color flowDoppler.   8. Mild simple atheromas at the aortic arch.   9. Trace pericardial effusion.  10. Compared to the transthoracic echocardiogram performed on 4/26/2024, no evidence of vegetation or intracardiac abscess, mobile mitral valve structure due to flailed posterior mitral leaflet. .    TTE W or WO Ultrasound Enhancing Agent (04.26.24 @ 12:43)   CONCLUSIONS:    1. Left ventricular cavity is normal in size. Left ventricular systolic function is normal with an ejection fraction visually estimated at 55 to 60 %.   2. Severe asymmetric left ventricular hypertrophy.   3. Normal right ventricular cavity size and normal systolic function.   4. The left atrium is severely dilated.   5. The right atrium is normal in size.   6. Thickened mitral valve leaflets.   7. Mobile structure noted on the posterior mitral valve leaflet may represent ruptured chordae. Cannot rule out vegetation.   8. Severe mitral regurgitation.   9. Trileaflet aortic valve with normal systolic excursion.  10. Mild aortic regurgitation.  11. Mild pulmonic regurgitation.  12. No pericardial effusion seen.  13. Estimated pulmonary artery systolic pressure is 59 mmHg, consistent with moderate-to-severe pulmonary hypertension.    IMAGING: Reviewed by me.     Neuro-Imaging    MR Head w/wo IV Cont (04.30.24 @ 08:34)   Extensive parenchymal volume loss and chronic microvessel ischemic   changes are identified  There is evidence of abnormal signal involving the left thalamus/corona   radiata/basal ganglia region. This corresponds acute parenchymal   hemorrhage seen on the prior head CT. Surrounding edema is identified No   definite areas of abnormal enhancement is seen though continued close   interval is recommended if underlying lesion is still suspected. This   area of hemorrhage measures approximately 3.1 x 1.9 cm  Numerous areas of abnormal susceptibility are seen throughout the   posterior fossa and supratentorial region. This finding could be   compatible with areas of old hemorrhage, possibly secondary to amyloid   angiopathy though the possibility of areas of mineralization or small   cavernous angiomas must be considered.  Evaluation of the diffusion weighted sequence demonstrates no abnormal   areas of restricted diffusion to suggest acute infarct.  The large vessels demonstrate normal flow voids  Opacification of the right sphenoid and right ethmoid sinus is identified   mucosal thickening. Similar finding is seen involving the right frontal   sinus. Air-fluid level seen involving the right maxillary sinus . Minimal   mucosal considered in the left maxillary sinus.  Mild inflammatory change involving right mastoid region.    IMPRESSION: Parenchymal hemorrhage again seen.  Numerous areas of abnormal susceptibility as described above.    CT Head No Cont (04.30.24 @ 01:23)   Impression: Stable acute parenchymal hemorrhage with slightly decreased   intraventricular hemorrhage.      CT Head No Cont (04.26.24 @ 06:16)   IMPRESSION:  Left thalamocapsular hemorrhage is stable    CT Head No Cont (04.25.24 @ 10:31)   1.  Grossly stable left thalamic hemorrhage.  2.  Midline shift to the right of approximately 0.4 cm.  3.  Chronic ischemic changes.  4.  Redemonstrated partially calcified masslike density in the right   nasal passages/ethmoid air cells, grossly stable.    CT Angio Neck Stroke Protocol w/ IV Cont (04.25.24 @ 04:26)   CTA NECK:  No significant stenosis of the cervical carotid arteries based   on NASCET criteria. Patent cervical vertebral arteries. No evidence of   cervical carotid or vertebral artery dissection.    Polypoid cystic and partially calcified mass in the right nasal passage   with protrudes into the knee nasopharynx and involves the right ethmoid   air cells. Near complete opacification of the right frontal sinus, right   ethmoid air cells, and right sphenoid sinus. Direct endoscopic   visualization is recommended. Correlation with outside prior imaging   would be helpful.    Mild pulmonary interstitial lung edema and small bilateral pleural   effusions.    CTA HEAD:  No high-grade stenosis or occlusion of the major proximal   arterial branches, however, short segment severe stenosis/occlusion of   the right posterior cerebral artery proximal P3 branch. No evidence of an   intracranial arterial aneurysm or arteriovenous malformation on CTA,   specifically, no evidence of vascular malformation in the region of the   left thalamic hemorrhage. No evidence of active extravasation of contrast   within the left thalamic parenchymal hemorrhage.    ULTRASOUND    US Duplex Venous Lower Ext Complete, Bilateral (04.28.24 @ 15:58)   No acute DVT of the lower extremities.            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  Per notes, was heading into work and his co-workers noted he suddenly stopped working and had a facial droop.  Initial /137. CTH showed LEFT thalamic IPH. Patient started on Cardene drip and sent to Neuro ICU. Now on stroke service for further workup and evaluation.      SUBJECTIVE: Noted again overnight with some disorientation.  No new neurologic complaints.  ROS reported negative unless otherwise noted.    amLODIPine   Tablet 7.5 milliGRAM(s) Oral daily  chlorhexidine 2% Cloths 1 Application(s) Topical daily  dextrose 10% Bolus 125 milliLiter(s) IV Bolus once  dextrose 5%. 1000 milliLiter(s) IV Continuous <Continuous>  dextrose 5%. 1000 milliLiter(s) IV Continuous <Continuous>  dextrose 50% Injectable 25 Gram(s) IV Push once  dextrose 50% Injectable 12.5 Gram(s) IV Push once  dextrose Oral Gel 15 Gram(s) Oral once PRN  enoxaparin Injectable 40 milliGRAM(s) SubCutaneous <User Schedule>  glucagon  Injectable 1 milliGRAM(s) IntraMuscular once  hydrALAZINE Injectable 10 milliGRAM(s) IV Push every 2 hours PRN  insulin lispro (ADMELOG) corrective regimen sliding scale   SubCutaneous three times a day before meals  labetalol Injectable 10 milliGRAM(s) IV Push every 2 hours PRN  polyethylene glycol 3350 17 Gram(s) Oral daily  senna 2 Tablet(s) Oral at bedtime  sodium chloride 0.9%. 1000 milliLiter(s) IV Continuous <Continuous>      PHYSICAL EXAM:   Vital Signs Last 24 Hrs  T(C): 36.5 (30 Apr 2024 04:41), Max: 37.2 (30 Apr 2024 00:18)  T(F): 97.7 (30 Apr 2024 04:41), Max: 98.9 (30 Apr 2024 00:18)  HR: 69 (30 Apr 2024 06:00) (64 - 89)  BP: 128/87 (30 Apr 2024 06:00) (104/65 - 163/101)  BP(mean): 97 (30 Apr 2024 06:00) (97 - 118)  RR: 20 (30 Apr 2024 06:00) (17 - 22)  SpO2: 95% (30 Apr 2024 06:00) (95% - 100%)    Parameters below as of 30 Apr 2024 06:00  Patient On (Oxygen Delivery Method): room air       General: No acute distress.   HEENT: right eye opacified, Head normocephalic, atraumatic. Conjunctivae clear w/o exudates or hemorrhage.  Respiratory: Chest wall symmetric, nontender. no use of accessory muscles, no audible wheezes or rhonchi  Abdominal: Soft, nondistended, nontender. + BDS/4 quad.   Skin: Skin is warm, dry and intact without rashes or lesions.   Extremities: No edema.     Detailed Neurologic Exam:    Mental status: The patient is awake in bed, oriented to self  only- states January, does recall April when reoriented,  IDs objects, following simple commands and cross midline with some hesitation, conversant relative to conversation.      Cranial nerves: Pupils equal and react symmetrically to light. R eye blind and opacified. Extraocular motion is full with no nystagmus. There is no ptosis. mild R facial palsy , Palate elevates symmetrically. Tongue is midline.    Motor: There is normal bulk and tone.  There is no tremor.  Strength is 4+/5 in the right arm, 4+/5 in right leg    Strength is 5/5 in the left arm and leg. Intermittent shaking of left leg, can stop on command     Sensation: Intact to light touch in 4 extremities, no extinction     Cerebellar:  no gross ataxia appreciated, in proportion to weakness     Gait : deferred    LABS:                        15.5   9.59  )-----------( 203      ( 30 Apr 2024 05:12 )             44.5    04-30    137  |  102  |  14.1  ----------------------------<  100<H>  3.6   |  23.0  |  0.71    Ca    9.1      30 Apr 2024 05:12  Phos  3.6     04-30  Mg     2.1     04-30    TPro  7.0  /  Alb  4.0  /  TBili  0.7  /  DBili  x   /  AST  14  /  ALT  10  /  AlkPhos  69  04-30        04-25 Chol 156 LDL -96- HDL 48 Trig 59    A1C: 5.6    ECHO:  ANTONIO W or WO Ultrasound Enhancing Agent (04.29.24 @ 13:05)    1. Left ventricular systolic function is normal with an ejection fraction visually estimated at 60 to 65 %.   2. Mild left ventricular hypertrophy.   3. Normal right ventricular cavity size and normal systolic function, inadequate estimation of RVSP.   4. The left atrium is moderately dilated.   5. No evidence of left atrial appendage thrombus. The left atrial appendage emptying velocity is normal.   6. Flailed posterior mitral leaflet (P3 scallop) with severe eccentric mitral regurgitation, jet is eccentric anteriorly directed, flailed gap of 0.46 cm.   7. Patent foramen ovale with left to right shunting by color flowDoppler.   8. Mild simple atheromas at the aortic arch.   9. Trace pericardial effusion.  10. Compared to the transthoracic echocardiogram performed on 4/26/2024, no evidence of vegetation or intracardiac abscess, mobile mitral valve structure due to flailed posterior mitral leaflet. .    TTE W or WO Ultrasound Enhancing Agent (04.26.24 @ 12:43)   CONCLUSIONS:    1. Left ventricular cavity is normal in size. Left ventricular systolic function is normal with an ejection fraction visually estimated at 55 to 60 %.   2. Severe asymmetric left ventricular hypertrophy.   3. Normal right ventricular cavity size and normal systolic function.   4. The left atrium is severely dilated.   5. The right atrium is normal in size.   6. Thickened mitral valve leaflets.   7. Mobile structure noted on the posterior mitral valve leaflet may represent ruptured chordae. Cannot rule out vegetation.   8. Severe mitral regurgitation.   9. Trileaflet aortic valve with normal systolic excursion.  10. Mild aortic regurgitation.  11. Mild pulmonic regurgitation.  12. No pericardial effusion seen.  13. Estimated pulmonary artery systolic pressure is 59 mmHg, consistent with moderate-to-severe pulmonary hypertension.    IMAGING: Reviewed by me.     Neuro-Imaging    MR Head w/wo IV Cont (04.30.24 @ 08:34)   Extensive parenchymal volume loss and chronic microvessel ischemic   changes are identified  There is evidence of abnormal signal involving the left thalamus/corona   radiata/basal ganglia region. This corresponds acute parenchymal   hemorrhage seen on the prior head CT. Surrounding edema is identified No   definite areas of abnormal enhancement is seen though continued close   interval is recommended if underlying lesion is still suspected. This   area of hemorrhage measures approximately 3.1 x 1.9 cm  Numerous areas of abnormal susceptibility are seen throughout the   posterior fossa and supratentorial region. This finding could be   compatible with areas of old hemorrhage, possibly secondary to amyloid   angiopathy though the possibility of areas of mineralization or small   cavernous angiomas must be considered.  Evaluation of the diffusion weighted sequence demonstrates no abnormal   areas of restricted diffusion to suggest acute infarct.  The large vessels demonstrate normal flow voids  Opacification of the right sphenoid and right ethmoid sinus is identified   mucosal thickening. Similar finding is seen involving the right frontal   sinus. Air-fluid level seen involving the right maxillary sinus . Minimal   mucosal considered in the left maxillary sinus.  Mild inflammatory change involving right mastoid region.    IMPRESSION: Parenchymal hemorrhage again seen.  Numerous areas of abnormal susceptibility as described above.    CT Head No Cont (04.30.24 @ 01:23)   Impression: Stable acute parenchymal hemorrhage with slightly decreased   intraventricular hemorrhage.      CT Head No Cont (04.26.24 @ 06:16)   IMPRESSION:  Left thalamocapsular hemorrhage is stable    CT Head No Cont (04.25.24 @ 10:31)   1.  Grossly stable left thalamic hemorrhage.  2.  Midline shift to the right of approximately 0.4 cm.  3.  Chronic ischemic changes.  4.  Redemonstrated partially calcified masslike density in the right   nasal passages/ethmoid air cells, grossly stable.    CT Angio Neck Stroke Protocol w/ IV Cont (04.25.24 @ 04:26)   CTA NECK:  No significant stenosis of the cervical carotid arteries based   on NASCET criteria. Patent cervical vertebral arteries. No evidence of   cervical carotid or vertebral artery dissection.    Polypoid cystic and partially calcified mass in the right nasal passage   with protrudes into the knee nasopharynx and involves the right ethmoid   air cells. Near complete opacification of the right frontal sinus, right   ethmoid air cells, and right sphenoid sinus. Direct endoscopic   visualization is recommended. Correlation with outside prior imaging   would be helpful.    Mild pulmonary interstitial lung edema and small bilateral pleural   effusions.    CTA HEAD:  No high-grade stenosis or occlusion of the major proximal   arterial branches, however, short segment severe stenosis/occlusion of   the right posterior cerebral artery proximal P3 branch. No evidence of an   intracranial arterial aneurysm or arteriovenous malformation on CTA,   specifically, no evidence of vascular malformation in the region of the   left thalamic hemorrhage. No evidence of active extravasation of contrast   within the left thalamic parenchymal hemorrhage.    ULTRASOUND    US Duplex Venous Lower Ext Complete, Bilateral (04.28.24 @ 15:58)   No acute DVT of the lower extremities.

## 2024-05-01 LAB
GLUCOSE BLDC GLUCOMTR-MCNC: 149 MG/DL — HIGH (ref 70–99)
GLUCOSE BLDC GLUCOMTR-MCNC: 83 MG/DL — SIGNIFICANT CHANGE UP (ref 70–99)
GLUCOSE BLDC GLUCOMTR-MCNC: 93 MG/DL — SIGNIFICANT CHANGE UP (ref 70–99)

## 2024-05-01 PROCEDURE — 99233 SBSQ HOSP IP/OBS HIGH 50: CPT

## 2024-05-01 RX ORDER — AMLODIPINE BESYLATE 2.5 MG/1
10 TABLET ORAL DAILY
Refills: 0 | Status: DISCONTINUED | OUTPATIENT
Start: 2024-05-02 | End: 2024-05-04

## 2024-05-01 RX ORDER — AMLODIPINE BESYLATE 2.5 MG/1
2.5 TABLET ORAL ONCE
Refills: 0 | Status: COMPLETED | OUTPATIENT
Start: 2024-05-01 | End: 2024-05-01

## 2024-05-01 RX ADMIN — ENOXAPARIN SODIUM 40 MILLIGRAM(S): 100 INJECTION SUBCUTANEOUS at 22:20

## 2024-05-01 RX ADMIN — SENNA PLUS 2 TABLET(S): 8.6 TABLET ORAL at 22:20

## 2024-05-01 RX ADMIN — POLYETHYLENE GLYCOL 3350 17 GRAM(S): 17 POWDER, FOR SOLUTION ORAL at 12:25

## 2024-05-01 RX ADMIN — CHLORHEXIDINE GLUCONATE 1 APPLICATION(S): 213 SOLUTION TOPICAL at 06:08

## 2024-05-01 RX ADMIN — Medication 650 MILLIGRAM(S): at 00:15

## 2024-05-01 RX ADMIN — AMLODIPINE BESYLATE 7.5 MILLIGRAM(S): 2.5 TABLET ORAL at 06:08

## 2024-05-01 RX ADMIN — SODIUM CHLORIDE 60 MILLILITER(S): 9 INJECTION INTRAMUSCULAR; INTRAVENOUS; SUBCUTANEOUS at 00:39

## 2024-05-01 RX ADMIN — SODIUM CHLORIDE 60 MILLILITER(S): 9 INJECTION INTRAMUSCULAR; INTRAVENOUS; SUBCUTANEOUS at 22:19

## 2024-05-01 RX ADMIN — AMLODIPINE BESYLATE 2.5 MILLIGRAM(S): 2.5 TABLET ORAL at 10:45

## 2024-05-01 NOTE — PROGRESS NOTE ADULT - ASSESSMENT
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 Upon presentation to [facility] ED initial BP was noted to be 198/137. CT head revealed LEFT thalamic IPH. CTA head and neck was negative except for stenosis of the right P3 segment. MRI demonstrated chronic small vessel disease and again noted left thalamic/basal ganglia/corona radiata acute parenchymal hemorrhage with cerebral edema, mass effect, and brain compression. There was no noted areas of abnormal enhancement. Additional regions of susceptibility seen throughout, consistent with regions of prior hemorrhage. The patient was started on a Cardene drip and sent to the Neuro ICU; the stroke team was called and the pt was then transferred to the service on 4/26/24 for further workup and management.     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     NEURO:   - Neurologically without acute change, overall continued improvement in right hemiparesis. Fluctuations in orientation possibly related to hospital delirium none the less infectious process being screened for, LLE shaking secondary to anxiety and nervousness reported by the pt - 4/30/24 spot EEG negative for seizures and ammonia WNL.   - CT head was repeated with no acute change and slightly improved IVH  - Continue close monitoring for neurologic deterioration    - Stroke neuro checks q 4 hr    - Keep SBP goal 120-140mmHg for now ; pt w/ SBP readings of 130-150mmHg today so received one dose of Norvasc 7.5 mg and added one dose of Norvasc 2.5mg for 10mg total; pt to start Norvasc 10mg daily tomorrow; continue to monitor avoiding rapid fluctuations and hypotension  - ANTITHROMBOTIC THERAPY: no acute neurological indication at this time.  If cardiac indication present will need to have ongoing evaluation for timeline as at this time risk of hemorrhage is increased.   - titrate statin based on outpatient ASCVD risk stratification, no cerebral ischemia at this time.  LDL: 96  - MRI Brain w/w/o as noted above , suggest repeat in 4-6 weeks upon resolution of hemorrhage.   - Dysphagia screen: pass, advance diet as tolerated per protocol   - Physical therapy/OT/Speech eval/treatment.     CARDIOVASCULAR: Severe MR  - TTE : EF 55-60% with severely dilated left atrium, LVH, severely dilated LA, mobile mitral valve posterior leaflet possibly ruptured chordae but can not exclude vegetation, cardiac monitoring w/ telemetry for now, further evaluation pending findings of noted workup, consideration in elective cardiac monitoring   - ANTONIO : Flailed posterior mitral leaflet (P3 scallop) with severe eccentric mitral regurgitation, jet is eccentric anteriorly directed, flailed gap of 0.46 cm, Patent foramen ovale with left to right shunting by color flow Doppler. Ejection fraction visually estimated at 60 to 65 %.  - Cardiology follow up for long term plan re: MV   - No acute neurological indication for PFO closure, non ischemic stroke and underlying hemorrhage/possible cerebral amyloid angiopathy.                             HEMATOLOGY:  - H/H without acute change, Platelets 203, patient should have all age and risk appropriate malignancy screenings with PCP or sooner if clinically suspected   -DVT ppx: Heparin s.c [] LMWH [x]     PULMONARY:   - On room air, protecting airway, saturating well     RENAL:   -BUN/Cr within range, monitor urine output, maintain adequate hydration    -Na Goal:  135-145  -Gallegos: N     ID:   - afebrile, no leukocytosis, monitor for si/sx of infection , blood cx x 2 in setting of r/o vegetation NGTD, UA neg nitrite/LE/bacteria.     OTHER:    - condition and plan of care d/w patient, questions and concerns addressed.   - TSH slightly elevated, suggest close follow up serial monitoring out of acute phase.  T3/4 within limits.     DISPOSITION:   Home once able.  Will d/w son.  Cannot go to Acute Rehab.        CORE MEASURES:        Admission NIHSS: 17     Tenecteplase : [] YES [x] NO      LDL/HDL/A1C: 96/48/5.8     Depression Screen - if depression hx and/or present      Statin Therapy: as noted      Dysphagia Screen: [x] PASS [] FAIL     Smoking [] YES [x] NO      Afib [] YES [x] NO     Stroke Education [x] YES [] NO 4/25/24    Obtain screening lower extremity venous ultrasound in patients who meet 1 or more of the following criteria as patient is high risk for DVT/PE on admission:   [] History of DVT/PE  []Hypercoagulable states (Factor V Leiden, Cancer, OCP, etc. )  []Prolonged immobility (hemiplegia/hemiparesis/post operative or any other extended immobilization)  [] Transferred from outside facility (Rehab or Long term care)  [] Age </= to 50

## 2024-05-01 NOTE — PROGRESS NOTE ADULT - SUBJECTIVE AND OBJECTIVE BOX
no edema,  no murmurs,  regular rate and rhythm Preliminary note, official recommendations pending attending review/signature   Seen and examined by Stroke team attending/team, assessment/ plan as discussed with stroke team attending/team as noted.      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 Patient LKW was about 0200 when he was headed into work that morning. 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 EMS was then called. Patient came in as code stroke. Per son patient takes asa daily for his heart and is unsure of any other medications or medical history. Son reports patient had been complaining of HA for about 2-3 weeks. In the ED /137. CTH showed LEFT thalamic IPH. NIHSS at time of examination was 17. Patient started on Cardene drip. MRs 0. ICH score 2.     SUBJECTIVE: Pt was noted again with fluctuating disorientation overnight. No new neurologic complaints. ROS reported negative unless otherwise noted.    dextrose 10% Bolus 125 milliLiter(s) IV Bolus once  dextrose 5%. 1000 milliLiter(s) IV Continuous <Continuous>  dextrose 5%. 1000 milliLiter(s) IV Continuous <Continuous>  dextrose 50% Injectable 25 Gram(s) IV Push once  dextrose 50% Injectable 12.5 Gram(s) IV Push once  dextrose Oral Gel 15 Gram(s) Oral once PRN  enoxaparin Injectable 40 milliGRAM(s) SubCutaneous <User Schedule>  glucagon  Injectable 1 milliGRAM(s) IntraMuscular once  hydrALAZINE Injectable 10 milliGRAM(s) IV Push every 2 hours PRN  insulin lispro (ADMELOG) corrective regimen sliding scale   SubCutaneous three times a day before meals  labetalol Injectable 10 milliGRAM(s) IV Push every 2 hours PRN  polyethylene glycol 3350 17 Gram(s) Oral daily  senna 2 Tablet(s) Oral at bedtime  sodium chloride 0.9%. 1000 milliLiter(s) IV Continuous <Continuous>      PHYSICAL EXAM:   Vital Signs Last 24 Hrs  T(C): 37.2 (01 May 2024 08:45), Max: 37.2 (01 May 2024 08:45)  T(F): 98.9 (01 May 2024 08:45), Max: 98.9 (01 May 2024 08:45)  HR: 80 (01 May 2024 12:00) (61 - 90)  BP: 147/87 (01 May 2024 12:00) (120/73 - 160/86)  BP(mean): 102 (01 May 2024 12:00) (77 - 107)  RR: 19 (01 May 2024 12:00) (16 - 26)  SpO2: 98% (01 May 2024 12:00) (94% - 99%)    Parameters below as of 01 May 2024 12:00  Patient On (Oxygen Delivery Method): room air    PHYSICAL EXAM:   General: Pt appears anxious as demonstrated by shaking left leg, when asked if he is nervous, responds "yes a little bit"' can stop shaking on command. Patient is sitting up and awake in his chair , appears more alert today.  HEENT: right eye opacified, Head normocephalic, atraumatic. Conjunctivae clear w/o exudates or hemorrhage.  Respiratory: Chest wall symmetric, nontender. No use of accessory muscles, no audible wheezes or rhonchi on auscultation  Abdominal: Soft, nondistended, nontender. Bowel sounds present to auscultation in all 4 quadrants.   Skin: Skin is warm, dry and intact without rashes or lesions.   Extremities: No edema    Detailed Neurologic Exam:    Mental status: The patient is awake in bed, oriented to self only - answers age "69", responds "no" when asked if he knows he is in the hospital, following simple commands and cross midline with some hesitation, more conversant today    Cranial nerves: Pupils equal and react symmetrically to light. R eye blind and opacified. Extraocular motion is full with no nystagmus. There is no ptosis. Mild right facial palsy , palate elevates symmetrically. Tongue is midline.    Motor: There is normal bulk and tone.  There is no tremor.  Strength is 4+/5 right deltoid, 3+/5 right elbow, 4+/5 right hand, 5/5 right leg    Strength is 5/5 in the left arm and leg. Intermittent shaking of left leg, can stop on command     Sensation: Intact to light touch in 4 extremities, no extinction     Cerebellar: no gross ataxia appreciated, is in proportion to weakness     Gait : deferred        LABS:                        15.5   9.59  )-----------( 203      ( 30 Apr 2024 05:12 )             44.5    04-30    137  |  102  |  14.1  ----------------------------<  100<H>  3.6   |  23.0  |  0.71    Ca    9.1      30 Apr 2024 05:12  Phos  3.6     04-30  Mg     2.1     04-30    TPro  7.0  /  Alb  4.0  /  TBili  0.7  /  DBili  x   /  AST  14  /  ALT  10  /  AlkPhos  69  04-30    04-25 Chol 156 LDL -96- HDL 48 Trig 59    A1C: 5.6      ECHO:    ANTONIO W or WO Ultrasound Enhancing Agent (04.29.24 @ 13:05)    1. Left ventricular systolic function is normal with an ejection fraction visually estimated at 60 to 65 %.   2. Mild left ventricular hypertrophy.   3. Normal right ventricular cavity size and normal systolic function, inadequate estimation of RVSP.   4. The left atrium is moderately dilated.   5. No evidence of left atrial appendage thrombus. The left atrial appendage emptying velocity is normal.   6. Flailed posterior mitral leaflet (P3 scallop) with severe eccentric mitral regurgitation, jet is eccentric anteriorly directed, flailed gap of 0.46 cm.   7. Patent foramen ovale with left to right shunting by color flow Doppler.   8. Mild simple atheromas at the aortic arch.   9. Trace pericardial effusion.  10. Compared to the transthoracic echocardiogram performed on 4/26/2024, no evidence of vegetation or intracardiac abscess, mobile mitral valve structure due to flailed posterior mitral leaflet. .    TTE W or WO Ultrasound Enhancing Agent (04.26.24 @ 12:43)   CONCLUSIONS:    1. Left ventricular cavity is normal in size. Left ventricular systolic function is normal with an ejection fraction visually estimated at 55 to 60 %.   2. Severe asymmetric left ventricular hypertrophy.   3. Normal right ventricular cavity size and normal systolic function.   4. The left atrium is severely dilated.   5. The right atrium is normal in size.   6. Thickened mitral valve leaflets.   7. Mobile structure noted on the posterior mitral valve leaflet may represent ruptured chordae. Cannot rule out vegetation.   8. Severe mitral regurgitation.   9. Trileaflet aortic valve with normal systolic excursion.  10. Mild aortic regurgitation.  11. Mild pulmonic regurgitation.  12. No pericardial effusion seen.  13. Estimated pulmonary artery systolic pressure is 59 mmHg, consistent with moderate-to-severe pulmonary hypertension.    IMAGING: Reviewed by me.     Neuro-Imaging    MR Head w/wo IV Cont (04.30.24 @ 08:34)   Extensive parenchymal volume loss and chronic microvessel ischemic changes are identified  There is evidence of abnormal signal involving the left thalamus/corona   radiata/basal ganglia region. This corresponds acute parenchymal   hemorrhage seen on the prior head CT. Surrounding edema is identified No   definite areas of abnormal enhancement is seen though continued close   interval is recommended if underlying lesion is still suspected. This   area of hemorrhage measures approximately 3.1 x 1.9 cm  Numerous areas of abnormal susceptibility are seen throughout the   posterior fossa and supratentorial region. This finding could be   compatible with areas of old hemorrhage, possibly secondary to amyloid   angiopathy though the possibility of areas of mineralization or small   cavernous angiomas must be considered.  Evaluation of the diffusion weighted sequence demonstrates no abnormal   areas of restricted diffusion to suggest acute infarct.  The large vessels demonstrate normal flow voids  Opacification of the right sphenoid and right ethmoid sinus is identified   mucosal thickening. Similar finding is seen involving the right frontal   sinus. Air-fluid level seen involving the right maxillary sinus . Minimal   mucosal considered in the left maxillary sinus.  Mild inflammatory change involving right mastoid region.    IMPRESSION: Parenchymal hemorrhage again seen.  Numerous areas of abnormal susceptibility as described above.    CT Head No Cont (04.30.24 @ 01:23)   Impression: Stable acute parenchymal hemorrhage with slightly decreased   intraventricular hemorrhage.      CT Head No Cont (04.26.24 @ 06:16)   IMPRESSION:  Left thalamocapsular hemorrhage is stable    CT Head No Cont (04.25.24 @ 10:31)   1.  Grossly stable left thalamic hemorrhage.  2.  Midline shift to the right of approximately 0.4 cm.  3.  Chronic ischemic changes.  4.  Redemonstrated partially calcified masslike density in the right   nasal passages/ethmoid air cells, grossly stable.    (04.25.24 @ 04:26)   CTA NECK:  No significant stenosis of the cervical carotid arteries based   on NASCET criteria. Patent cervical vertebral arteries. No evidence of   cervical carotid or vertebral artery dissection.    Polypoid cystic and partially calcified mass in the right nasal passage   with protrudes into the knee nasopharynx and involves the right ethmoid   air cells. Near complete opacification of the right frontal sinus, right   ethmoid air cells, and right sphenoid sinus. Direct endoscopic   visualization is recommended. Correlation with outside prior imaging   would be helpful.    Mild pulmonary interstitial lung edema and small bilateral pleural   effusions.    CTA HEAD:  No high-grade stenosis or occlusion of the major proximal   arterial branches, however, short segment severe stenosis/occlusion of   the right posterior cerebral artery proximal P3 branch. No evidence of an   intracranial arterial aneurysm or arteriovenous malformation on CTA,   specifically, no evidence of vascular malformation in the region of the   left thalamic hemorrhage. No evidence of active extravasation of contrast   within the left thalamic parenchymal hemorrhage.    ULTRASOUND    US Duplex Venous Lower Ext Complete, Bilateral (04.28.24 @ 15:58)   No acute DVT of the lower extremities.          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 Patient LKW was about 0200 when he was headed into work that morning. 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 EMS was then called. Patient came in as code stroke. Per son patient takes asa daily for his heart and is unsure of any other medications or medical history. Son reports patient had been complaining of HA for about 2-3 weeks. In the ED /137. CTH showed LEFT thalamic IPH. NIHSS at time of examination was 17. Patient started on Cardene drip. MRs 0. ICH score 2.     SUBJECTIVE: Pt was noted again with fluctuating disorientation overnight. No new neurologic complaints. ROS reported negative unless otherwise noted.    dextrose 10% Bolus 125 milliLiter(s) IV Bolus once  dextrose 5%. 1000 milliLiter(s) IV Continuous <Continuous>  dextrose 5%. 1000 milliLiter(s) IV Continuous <Continuous>  dextrose 50% Injectable 25 Gram(s) IV Push once  dextrose 50% Injectable 12.5 Gram(s) IV Push once  dextrose Oral Gel 15 Gram(s) Oral once PRN  enoxaparin Injectable 40 milliGRAM(s) SubCutaneous <User Schedule>  glucagon  Injectable 1 milliGRAM(s) IntraMuscular once  hydrALAZINE Injectable 10 milliGRAM(s) IV Push every 2 hours PRN  insulin lispro (ADMELOG) corrective regimen sliding scale   SubCutaneous three times a day before meals  labetalol Injectable 10 milliGRAM(s) IV Push every 2 hours PRN  polyethylene glycol 3350 17 Gram(s) Oral daily  senna 2 Tablet(s) Oral at bedtime  sodium chloride 0.9%. 1000 milliLiter(s) IV Continuous <Continuous>      PHYSICAL EXAM:   Vital Signs Last 24 Hrs  T(C): 37.2 (01 May 2024 08:45), Max: 37.2 (01 May 2024 08:45)  T(F): 98.9 (01 May 2024 08:45), Max: 98.9 (01 May 2024 08:45)  HR: 80 (01 May 2024 12:00) (61 - 90)  BP: 147/87 (01 May 2024 12:00) (120/73 - 160/86)  BP(mean): 102 (01 May 2024 12:00) (77 - 107)  RR: 19 (01 May 2024 12:00) (16 - 26)  SpO2: 98% (01 May 2024 12:00) (94% - 99%)    Parameters below as of 01 May 2024 12:00  Patient On (Oxygen Delivery Method): room air    PHYSICAL EXAM:   General: Pt appears anxious as demonstrated by shaking left leg, when asked if he is nervous, responds "yes a little bit"' can stop shaking on command. Patient is sitting up and awake in his chair , appears more alert today.  HEENT: right eye opacified, Head normocephalic, atraumatic. Conjunctivae clear w/o exudates or hemorrhage.  Respiratory: Chest wall symmetric, nontender. No use of accessory muscles, no audible wheezes or rhonchi on auscultation  Abdominal: Soft, nondistended, nontender. Bowel sounds present to auscultation in all 4 quadrants.   Skin: Skin is warm, dry and intact without rashes or lesions.   Extremities: No edema    Detailed Neurologic Exam:    Mental status: The patient is awake in bed, oriented to self only - answers age "69", responds "no" when asked if he knows he is in the hospital, following simple commands and cross midline with some hesitation, more conversant today    Cranial nerves: Pupils equal and react symmetrically to light. R eye blind and opacified. Extraocular motion is full with no nystagmus. There is no ptosis. Mild right facial palsy , palate elevates symmetrically. Tongue is midline.    Motor: There is normal bulk and tone.  There is no tremor.  Strength is 4+/5 right deltoid, 3+/5 right elbow, 4+/5 right hand, 5/5 right leg    Strength is 5/5 in the left arm and leg. Intermittent shaking of left leg, can stop on command     Sensation: Intact to light touch in 4 extremities, no extinction     Cerebellar: no gross ataxia appreciated, is in proportion to weakness     Gait : deferred        LABS:                        15.5   9.59  )-----------( 203      ( 30 Apr 2024 05:12 )             44.5    04-30    137  |  102  |  14.1  ----------------------------<  100<H>  3.6   |  23.0  |  0.71    Ca    9.1      30 Apr 2024 05:12  Phos  3.6     04-30  Mg     2.1     04-30    TPro  7.0  /  Alb  4.0  /  TBili  0.7  /  DBili  x   /  AST  14  /  ALT  10  /  AlkPhos  69  04-30 04-25 Chol 156 LDL -96- HDL 48 Trig 59    A1C: 5.6      ECHO:    ANTONIO W or WO Ultrasound Enhancing Agent (04.29.24 @ 13:05)    1. Left ventricular systolic function is normal with an ejection fraction visually estimated at 60 to 65 %.   2. Mild left ventricular hypertrophy.   3. Normal right ventricular cavity size and normal systolic function, inadequate estimation of RVSP.   4. The left atrium is moderately dilated.   5. No evidence of left atrial appendage thrombus. The left atrial appendage emptying velocity is normal.   6. Flailed posterior mitral leaflet (P3 scallop) with severe eccentric mitral regurgitation, jet is eccentric anteriorly directed, flailed gap of 0.46 cm.   7. Patent foramen ovale with left to right shunting by color flow Doppler.   8. Mild simple atheromas at the aortic arch.   9. Trace pericardial effusion.  10. Compared to the transthoracic echocardiogram performed on 4/26/2024, no evidence of vegetation or intracardiac abscess, mobile mitral valve structure due to flailed posterior mitral leaflet. .    TTE W or WO Ultrasound Enhancing Agent (04.26.24 @ 12:43)   CONCLUSIONS:    1. Left ventricular cavity is normal in size. Left ventricular systolic function is normal with an ejection fraction visually estimated at 55 to 60 %.   2. Severe asymmetric left ventricular hypertrophy.   3. Normal right ventricular cavity size and normal systolic function.   4. The left atrium is severely dilated.   5. The right atrium is normal in size.   6. Thickened mitral valve leaflets.   7. Mobile structure noted on the posterior mitral valve leaflet may represent ruptured chordae. Cannot rule out vegetation.   8. Severe mitral regurgitation.   9. Trileaflet aortic valve with normal systolic excursion.  10. Mild aortic regurgitation.  11. Mild pulmonic regurgitation.  12. No pericardial effusion seen.  13. Estimated pulmonary artery systolic pressure is 59 mmHg, consistent with moderate-to-severe pulmonary hypertension.    IMAGING: Reviewed by me.     Neuro-Imaging    MR Head w/wo IV Cont (04.30.24 @ 08:34)   Extensive parenchymal volume loss and chronic microvessel ischemic changes are identified  There is evidence of abnormal signal involving the left thalamus/corona   radiata/basal ganglia region. This corresponds acute parenchymal   hemorrhage seen on the prior head CT. Surrounding edema is identified No   definite areas of abnormal enhancement is seen though continued close   interval is recommended if underlying lesion is still suspected. This   area of hemorrhage measures approximately 3.1 x 1.9 cm  Numerous areas of abnormal susceptibility are seen throughout the   posterior fossa and supratentorial region. This finding could be   compatible with areas of old hemorrhage, possibly secondary to amyloid   angiopathy though the possibility of areas of mineralization or small   cavernous angiomas must be considered.  Evaluation of the diffusion weighted sequence demonstrates no abnormal   areas of restricted diffusion to suggest acute infarct.  The large vessels demonstrate normal flow voids  Opacification of the right sphenoid and right ethmoid sinus is identified   mucosal thickening. Similar finding is seen involving the right frontal   sinus. Air-fluid level seen involving the right maxillary sinus . Minimal   mucosal considered in the left maxillary sinus.  Mild inflammatory change involving right mastoid region.    IMPRESSION: Parenchymal hemorrhage again seen.  Numerous areas of abnormal susceptibility as described above.    CT Head No Cont (04.30.24 @ 01:23)   Impression: Stable acute parenchymal hemorrhage with slightly decreased   intraventricular hemorrhage.      CT Head No Cont (04.26.24 @ 06:16)   IMPRESSION:  Left thalamocapsular hemorrhage is stable    CT Head No Cont (04.25.24 @ 10:31)   1.  Grossly stable left thalamic hemorrhage.  2.  Midline shift to the right of approximately 0.4 cm.  3.  Chronic ischemic changes.  4.  Redemonstrated partially calcified masslike density in the right   nasal passages/ethmoid air cells, grossly stable.    (04.25.24 @ 04:26)   CTA NECK:  No significant stenosis of the cervical carotid arteries based   on NASCET criteria. Patent cervical vertebral arteries. No evidence of   cervical carotid or vertebral artery dissection.    Polypoid cystic and partially calcified mass in the right nasal passage   with protrudes into the knee nasopharynx and involves the right ethmoid   air cells. Near complete opacification of the right frontal sinus, right   ethmoid air cells, and right sphenoid sinus. Direct endoscopic   visualization is recommended. Correlation with outside prior imaging   would be helpful.    Mild pulmonary interstitial lung edema and small bilateral pleural   effusions.    CTA HEAD:  No high-grade stenosis or occlusion of the major proximal   arterial branches, however, short segment severe stenosis/occlusion of   the right posterior cerebral artery proximal P3 branch. No evidence of an   intracranial arterial aneurysm or arteriovenous malformation on CTA,   specifically, no evidence of vascular malformation in the region of the   left thalamic hemorrhage. No evidence of active extravasation of contrast   within the left thalamic parenchymal hemorrhage.    ULTRASOUND    US Duplex Venous Lower Ext Complete, Bilateral (04.28.24 @ 15:58)   No acute DVT of the lower extremities.

## 2024-05-01 NOTE — PROGRESS NOTE ADULT - SUBJECTIVE AND OBJECTIVE BOX
Patient fatigued.  Calm and in no distress.    FUNCTIONAL PROGRESS   PT  Bed Mobility  Bed Mobility Training Sit-to-Supine: moderate assist (50% patient effort)  Bed Mobility Training Supine-to-Sit: moderate assist (50% patient effort)    Sit-Stand Transfer Training  Transfer Training Sit-to-Stand Transfer: moderate assist (50% patient effort)  Transfer Training Stand-to-Sit Transfer: moderate assist (50% patient effort)    Gait Training  Gait Trainin feet RW modA  Gait Analysis: unsteadiness throughout, posterior trunk lean, ataxic gait pattern, verbal cues for sequencing      VITALS  T(C): 36.7 (24 @ 04:10), Max: 37.1 (24 @ 15:32)  HR: 71 (24 @ 06:00) (61 - 86)  BP: 144/67 (24 @ 06:00) (120/73 - 152/90)  RR: 18 (24 @ 06:00) (16 - 22)  SpO2: 98% (24 @ 06:00) (94% - 99%)  Wt(kg): --    MEDICATIONS   amLODIPine   Tablet 7.5 milliGRAM(s) daily  chlorhexidine 2% Cloths 1 Application(s) daily  dextrose 10% Bolus 125 milliLiter(s) once  dextrose 5%. 1000 milliLiter(s) <Continuous>  dextrose 5%. 1000 milliLiter(s) <Continuous>  dextrose 50% Injectable 12.5 Gram(s) once  dextrose 50% Injectable 25 Gram(s) once  dextrose Oral Gel 15 Gram(s) once PRN  enoxaparin Injectable 40 milliGRAM(s) <User Schedule>  glucagon  Injectable 1 milliGRAM(s) once  hydrALAZINE Injectable 10 milliGRAM(s) every 2 hours PRN  insulin lispro (ADMELOG) corrective regimen sliding scale   three times a day before meals  labetalol Injectable 10 milliGRAM(s) every 2 hours PRN  polyethylene glycol 3350 17 Gram(s) daily  senna 2 Tablet(s) at bedtime  sodium chloride 0.9%. 1000 milliLiter(s) <Continuous>      RECENT LABS/IMAGING  - Reviewed Today                        15.5   9.59  )-----------( 203      ( 2024 05:12 )             44.5     04-30    137  |  102  |  14.1  ----------------------------<  100<H>  3.6   |  23.0  |  0.71    Ca    9.1      2024 05:12  Phos  3.6       Mg     2.1         TPro  7.0  /  Alb  4.0  /  TBili  0.7  /  DBili  x   /  AST  14  /  ALT  10  /  AlkPhos  69        Urinalysis Basic - ( 2024 11:20 )    Color: Yellow / Appearance: Clear / S.021 / pH: x  Gluc: x / Ketone: 15 mg/dL  / Bili: Negative / Urobili: 1.0 mg/dL   Blood: x / Protein: Trace mg/dL / Nitrite: Negative   Leuk Esterase: Negative / RBC: 4 /HPF / WBC 0 /HPF   Sq Epi: x / Non Sq Epi: 0 /HPF / Bacteria: Negative /HPF                CT Head  - Left thalamocapsular hemorrhage is stable      CT Head  - 1.  Grossly stable left thalamic hemorrhage. 2.  Midline shift to the right of approximately 0.4 cm. 3.  Chronic ischemic changes. 4.  Redemonstrated partially calcified masslike density in the right  nasal passages/ethmoid air cells, grossly stable.      CTA NECK & HEAD  -   No significant stenosis of the cervical carotid arteries based on NASCET criteria. Patent cervical vertebral arteries. No evidence of cervical carotid or vertebral artery dissection. Polypoid cystic and partially calcified mass in the right nasal passage with protrudes into the knee nasopharynx and involves the right ethmoid  air cells. Near complete opacification of the right frontal sinus, right ethmoid air cells, and right sphenoid sinus. Direct endoscopic visualization is recommended. Correlation with outside prior imaging  would be helpful. Mild pulmonary interstitial lung edema and small bilateral pleural effusions. CTA HEAD:  No high-grade stenosis or occlusion of the major proximal  arterial branches, however, short segment severe stenosis/occlusion of the right posterior cerebral artery proximal P3 branch. No evidence of an intracranial arterial aneurysm or arteriovenous malformation on CTA, specifically, no evidence of vascular malformation in the region of the left thalamic hemorrhage. No evidence of active extravasation of contrast within the left thalamic parenchymal hemorrhage.        TTE  -  1. Left ventricular cavity is normal in size. Left ventricular systolic function is normal with an ejection fraction visually estimated at 55 to 60 %.   2. Severe asymmetric left ventricular hypertrophy.   3. Normal right ventricular cavity size and normal systolic function.   4. The left atrium is severely dilated.   5. The right atrium is normal in size.   6. Thickened mitral valve leaflets.   7. Mobile structure noted on the posterior mitral valve leaflet may represent ruptured chordae. Cannot rule out vegetation.   8. Severe mitral regurgitation.   9. Trileaflet aortic valve with normal systolic excursion.  10. Mild aortic regurgitation.  11. Mild pulmonic regurgitation.  12. No pericardial effusion seen.  13. Estimated pulmonary artery systolic pressure is 59 mmHg, consistent with moderate-to-severe pulmonary hypertension.    HEAD CT  - Stable acute parenchymal hemorrhage with slightly decreased intraventricular hemorrhage.    MRI HEAD  - Parenchymal hemorrhage again seen. Numerous areas of abnormal susceptibility as described above.    EEG  - Abnormal EEG study - Focal intermittent left frontotemporal slowing. Mild generalized background slowing.  Periods of lower extremity shaking noted, without any correlating abnormality on EEG recording. Clinical Impression:  Focal cerebral dysfunction in left frontotemporal Mild diffuse/multi-focal cerebral dysfunction, not specific as to etiology.  There were no epileptiform abnormalities recorded.  Periods of lower extremity shaking, likely non epileptic, clinical correlation recommended.  ----------------------------------------------------------------------------------------  PHYSICAL EXAM  Constitutional - NAD, Comfortable  Extremities - No edema   Neurologic Exam -        Cognitive - AAOx self, being in hospital, PART situation      Communication - Expressive/Receptive deficits     Cranial Nerves - Right lip droop      FUNCTIONAL MOTOR EXAM - Noted right SH FLEXION and KE weakness with slowed movement, Right/Left discrimination impairments     Sensory - ?Left decrease to LT  Psychiatric - Restless  ----------------------------------------------------------------------------------------  ASSESSMENT/PLAN  58yMale with functional deficits after developing a intraparenchymal hemorrhage.   Left Thalamic IPH - Stable   HTN - Norvasc, Hydralazine, Labetalol   Sleep - RECOMMEND Melatonin 5mg   Pain - RECOMMEND Tylenol 975mg    DVT PPX - SCDs, Lovenox  Rehab/Impaired mobility and function - Patient continues to require hospitalization for the above diagnoses and ongoing active management of comorbid complications (IV meds) that are substantially impairing functional ability and impairing quality of life.     RECOMMEND - OOB daily      Patient with complex psychosocial factors and limited resources for rehab. Therefore, CM and therapy services will need to work on support in community for disposition of HOME.     Will continue to follow. Recommend ongoing mobilization by staff to maintain cardiopulmonary function and prevention of secondary complications related to debility. Discussed the specific management and recommendations above with rehab clinical care team/rehab liaison.

## 2024-05-02 LAB
GLUCOSE BLDC GLUCOMTR-MCNC: 162 MG/DL — HIGH (ref 70–99)
GLUCOSE BLDC GLUCOMTR-MCNC: 90 MG/DL — SIGNIFICANT CHANGE UP (ref 70–99)
GLUCOSE BLDC GLUCOMTR-MCNC: 92 MG/DL — SIGNIFICANT CHANGE UP (ref 70–99)

## 2024-05-02 PROCEDURE — 99233 SBSQ HOSP IP/OBS HIGH 50: CPT | Mod: GC

## 2024-05-02 PROCEDURE — 99232 SBSQ HOSP IP/OBS MODERATE 35: CPT

## 2024-05-02 RX ORDER — AMLODIPINE BESYLATE 2.5 MG/1
1 TABLET ORAL
Qty: 30 | Refills: 0
Start: 2024-05-02 | End: 2024-05-31

## 2024-05-02 RX ADMIN — AMLODIPINE BESYLATE 10 MILLIGRAM(S): 2.5 TABLET ORAL at 05:21

## 2024-05-02 RX ADMIN — ENOXAPARIN SODIUM 40 MILLIGRAM(S): 100 INJECTION SUBCUTANEOUS at 21:36

## 2024-05-02 NOTE — PROGRESS NOTE ADULT - ASSESSMENT
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 Upon presentation to [facility] ED initial BP was noted to be 198/137. CT head revealed LEFT thalamic IPH. CTA head and neck was negative except for stenosis of the right P3 segment. MRI demonstrated chronic small vessel disease and again noted left thalamic/basal ganglia/corona radiata acute parenchymal hemorrhage with cerebral edema, mass effect, and brain compression. There was no noted areas of abnormal enhancement. Additional regions of susceptibility seen throughout, consistent with regions of prior hemorrhage. The patient was started on a Cardene drip and sent to the Neuro ICU; the stroke team was called and the pt was then transferred to the service on 4/26/24 for further workup and management.     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     NEURO:   - Neurologically without acute change, overall continued improvement in right hemiparesis. Fluctuations in orientation possibly related to hospital delirium none the less infectious process being screened for, LLE shaking secondary to anxiety and nervousness reported by the pt - 4/30/24 spot EEG negative for seizures and ammonia WNL.   - CT head was repeated with no acute change and slightly improved IVH  - Continue close monitoring for neurologic deterioration    - Stroke neuro checks q 4 hr    - Keep SBP goal 120-140mmHg for now ; pt w/ SBP readings of 130-150mmHg today so received one dose of Norvasc 7.5 mg and added one dose of Norvasc 2.5mg for 10mg total; pt to start Norvasc 10mg daily tomorrow; continue to monitor avoiding rapid fluctuations and hypotension  - ANTITHROMBOTIC THERAPY: no acute neurological indication at this time.  If cardiac indication present will need to have ongoing evaluation for timeline as at this time risk of hemorrhage is increased.   - titrate statin based on outpatient ASCVD risk stratification, no cerebral ischemia at this time.  LDL: 96  - MRI Brain w/w/o as noted above , suggest repeat in 4-6 weeks upon resolution of hemorrhage.   - Dysphagia screen: pass, advance diet as tolerated per protocol   - Physical therapy/OT/Speech eval/treatment.     CARDIOVASCULAR: Severe MR  - TTE : EF 55-60% with severely dilated left atrium, LVH, severely dilated LA, mobile mitral valve posterior leaflet possibly ruptured chordae but can not exclude vegetation, cardiac monitoring w/ telemetry for now, further evaluation pending findings of noted workup, consideration in elective cardiac monitoring   - ANTONIO : Flailed posterior mitral leaflet (P3 scallop) with severe eccentric mitral regurgitation, jet is eccentric anteriorly directed, flailed gap of 0.46 cm, Patent foramen ovale with left to right shunting by color flow Doppler. Ejection fraction visually estimated at 60 to 65 %.  - Cardiology follow up for long term plan re: MV   - No acute neurological indication for PFO closure, non ischemic stroke and underlying hemorrhage/possible cerebral amyloid angiopathy.                             HEMATOLOGY:  - H/H without acute change, Platelets 203, patient should have all age and risk appropriate malignancy screenings with PCP or sooner if clinically suspected   -DVT ppx: Heparin s.c [] LMWH [x]     PULMONARY:   - On room air, protecting airway, saturating well     RENAL:   -BUN/Cr within range, monitor urine output, maintain adequate hydration    -Na Goal:  135-145  -Gallegos: N     ID:   - afebrile, no leukocytosis, monitor for si/sx of infection , blood cx x 2 in setting of r/o vegetation NGTD, UA neg nitrite/LE/bacteria.     OTHER:    - condition and plan of care d/w patient, questions and concerns addressed.   - TSH slightly elevated, suggest close follow up serial monitoring out of acute phase.  T3/4 within limits.     DISPOSITION:   Home once able.  Will d/w son.  Cannot go to Acute Rehab.        CORE MEASURES:        Admission NIHSS: 17     Tenecteplase : [] YES [x] NO      LDL/HDL/A1C: 96/48/5.8     Depression Screen - if depression hx and/or present      Statin Therapy: as noted      Dysphagia Screen: [x] PASS [] FAIL     Smoking [] YES [x] NO      Afib [] YES [x] NO     Stroke Education [x] YES [] NO 4/25/24    Obtain screening lower extremity venous ultrasound in patients who meet 1 or more of the following criteria as patient is high risk for DVT/PE on admission:   [] History of DVT/PE  []Hypercoagulable states (Factor V Leiden, Cancer, OCP, etc. 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 Upon presentation to [facility] ED initial BP was noted to be 198/137. CT head revealed LEFT thalamic IPH. CTA head and neck was negative except for stenosis of the right P3 segment. MRI demonstrated chronic small vessel disease and again noted left thalamic/basal ganglia/corona radiata acute parenchymal hemorrhage with cerebral edema, mass effect, and brain compression. There was no noted areas of abnormal enhancement. Additional regions of susceptibility seen throughout, consistent with regions of prior hemorrhage. The patient was started on a Cardene drip and sent to the Neuro ICU; the stroke team was called and the pt was then transferred to the service on 4/26/24 for further workup and management.     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     NEURO:   - Neurologically without acute change, overall continued improvement in right hemiparesis. Fluctuations in orientation possibly related to hospital delirium.    - LLE shaking secondary to anxiety and nervousness reported by the pt - 4/30/24 spot EEG negative for seizures and ammonia WNL.   - Repeat CT head 4/30/24 with no acute change and slightly improved IVH  - Continue close monitoring for neurologic deterioration    - Stroke neuro checks q 4 hr    - Keep SBP goal 120-140mmHg for now ; pt w/ SBP readings of 130-150mmHg today ; pt now on Norvasc 10mg daily, continue to monitor avoiding rapid fluctuations and hypotension  - ANTITHROMBOTIC THERAPY: no acute neurological indication at this time.  If cardiac indication present will need to have ongoing evaluation for timeline as at this time risk of hemorrhage is increased.   - titrate statin based on outpatient ASCVD risk stratification, no cerebral ischemia at this time.  LDL: 96  - MRI Brain w/w/o as noted above , suggest repeat in 4-6 weeks upon resolution of hemorrhage.   - Dysphagia screen: pass, advance diet as tolerated per protocol   - Physical therapy/OT/Speech eval/treatment.     CARDIOVASCULAR: Severe MR  - TTE : EF 55-60% with severely dilated left atrium, LVH, severely dilated LA, mobile mitral valve posterior leaflet possibly ruptured chordae but can not exclude vegetation, cardiac monitoring w/ telemetry for now, further evaluation pending findings of noted workup, consideration in elective cardiac monitoring   - ANTONIO : Flailed posterior mitral leaflet (P3 scallop) with severe eccentric mitral regurgitation, jet is eccentric anteriorly directed, flailed gap of 0.46 cm, Patent foramen ovale with left to right shunting by color flow Doppler. Ejection fraction visually estimated at 60 to 65 %.  - Cardiology follow up for long term plan re: MV   - No acute neurological indication for PFO closure, non ischemic stroke and underlying hemorrhage/possible cerebral amyloid angiopathy.                             HEMATOLOGY:  - H/H without acute change, Platelets 203, patient should have all age and risk appropriate malignancy screenings with PCP or sooner if clinically suspected   -DVT ppx: Heparin s.c [] LMWH [x]     PULMONARY:   - On room air, protecting airway, saturating well     RENAL:   -BUN/Cr within range, monitor urine output, maintain adequate hydration    -Na Goal:  135-145  -Gallegos: N     ID:   - afebrile, no leukocytosis, monitor for si/sx of infection , blood cx x 2 in setting of r/o vegetation NGTD,  - 4/30/24 UA negative nitrite/LE/bacteria.     OTHER:    - condition and plan of care d/w patient, questions and concerns addressed.   - TSH slightly elevated, suggest close follow up serial monitoring out of acute phase.  T3/4 within limits.     DISPOSITION:   Home once able. Family training.  Will d/w son.  Cannot go to Acute Rehab.        CORE MEASURES:        Admission NIHSS: 17     Tenecteplase : [] YES [x] NO      LDL/HDL/A1C: 96/48/5.8     Depression Screen - if depression hx and/or present      Statin Therapy: as noted      Dysphagia Screen: [x] PASS [] FAIL     Smoking [] YES [x] NO      Afib [] YES [x] NO     Stroke Education [x] YES [] NO 4/25/24    Obtain screening lower extremity venous ultrasound in patients who meet 1 or more of the following criteria as patient is high risk for DVT/PE on admission:   [] History of DVT/PE  []Hypercoagulable states (Factor V Leiden, Cancer, OCP, etc. 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 Upon presentation to [facility] ED initial BP was noted to be 198/137. CT head revealed LEFT thalamic IPH. CTA head and neck was negative except for stenosis of the right P3 segment. MRI demonstrated chronic small vessel disease and again noted left thalamic/basal ganglia/corona radiata acute parenchymal hemorrhage with cerebral edema, mass effect, and brain compression. There was no noted areas of abnormal enhancement. Additional regions of susceptibility seen throughout, consistent with regions of prior hemorrhage. The patient was started on a Cardene drip and sent to the Neuro ICU; the stroke team was called and the pt was then transferred to the service on 4/26/24 for further workup and management.     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   Patient is improving, more alert and getting stronger.     NEURO:   - Neurologically without acute change, overall continued improvement in right hemiparesis. Fluctuations in orientation possibly related to hospital delirium.    - LLE shaking secondary to anxiety and nervousness reported by the pt - 4/30/24 spot EEG negative for seizures and ammonia WNL.   - Repeat CT head 4/30/24 with no acute change and slightly improved IVH  - Continue close monitoring for neurologic deterioration    - Stroke neuro checks q 4 hr    - Keep SBP goal 120-140mmHg for now ; pt w/ SBP readings of 130-150mmHg today ; pt now on Norvasc 10mg daily, continue to monitor avoiding rapid fluctuations and hypotension  - ANTITHROMBOTIC THERAPY: no acute neurological indication at this time.  If cardiac indication present will need to have ongoing evaluation for timeline as at this time risk of hemorrhage is increased.   - titrate statin based on outpatient ASCVD risk stratification, no cerebral ischemia at this time.  LDL: 96  - MRI Brain w/w/o as noted above , suggest repeat in 4-6 weeks upon resolution of hemorrhage.   - Dysphagia screen: pass, advance diet as tolerated per protocol   - Physical therapy/OT/Speech eval/treatment.     CARDIOVASCULAR: Severe MR  - TTE : EF 55-60% with severely dilated left atrium, LVH, severely dilated LA, mobile mitral valve posterior leaflet possibly ruptured chordae but can not exclude vegetation, cardiac monitoring w/ telemetry for now, further evaluation pending findings of noted workup, consideration in elective cardiac monitoring   - ANTONIO : Flailed posterior mitral leaflet (P3 scallop) with severe eccentric mitral regurgitation, jet is eccentric anteriorly directed, flailed gap of 0.46 cm, Patent foramen ovale with left to right shunting by color flow Doppler. Ejection fraction visually estimated at 60 to 65 %.  - Cardiology follow up for long term plan re: MV   - No acute neurological indication for PFO closure, non ischemic stroke and underlying hemorrhage/possible cerebral amyloid angiopathy.                             HEMATOLOGY:  - H/H without acute change, Platelets 203, patient should have all age and risk appropriate malignancy screenings with PCP or sooner if clinically suspected   -DVT ppx: Heparin s.c [] LMWH [x]     PULMONARY:   - On room air, protecting airway, saturating well     RENAL:   -BUN/Cr within range, monitor urine output, maintain adequate hydration    -Na Goal:  135-145  -Gallegos: N     ID:   - afebrile, no leukocytosis, monitor for si/sx of infection , blood cx x 2 in setting of r/o vegetation NGTD,  - 4/30/24 UA negative nitrite/LE/bacteria.     OTHER:    - condition and plan of care d/w patient, questions and concerns addressed.   - TSH slightly elevated, suggest close follow up serial monitoring out of acute phase.  T3/4 within limits.     DISPOSITION:   Home once able. Family training.  Will d/w son.  Cannot go to Acute Rehab.  D/w Family--they will take him home on Saturday.       CORE MEASURES:        Admission NIHSS: 17     Tenecteplase : [] YES [x] NO      LDL/HDL/A1C: 96/48/5.8     Depression Screen - if depression hx and/or present      Statin Therapy: as noted      Dysphagia Screen: [x] PASS [] FAIL     Smoking [] YES [x] NO      Afib [] YES [x] NO     Stroke Education [x] YES [] NO 4/25/24    Obtain screening lower extremity venous ultrasound in patients who meet 1 or more of the following criteria as patient is high risk for DVT/PE on admission:   [] History of DVT/PE  []Hypercoagulable states (Factor V Leiden, Cancer, OCP, etc. )  []Prolonged immobility (hemiplegia/hemiparesis/post operative or any other extended immobilization)  [] Transferred from outside facility (Rehab or Long term care)  [] Age </= to 50

## 2024-05-02 NOTE — PROGRESS NOTE ADULT - SUBJECTIVE AND OBJECTIVE BOX
Preliminary note, official recommendations pending attending review/signature   Seen and examined by Stroke team attending/team, assessment/ plan as discussed with stroke team attending/team as noted.     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 Patient LKW was about 0200 when he was headed into work that morning. 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 EMS was then called. Patient came in as code stroke. Per son patient takes asa daily for his heart and is unsure of any other medications or medical history. Son reports patient had been complaining of HA for about 2-3 weeks. In the ED /137. CTH showed LEFT thalamic IPH. NIHSS at time of examination was 17. Patient started on Cardene drip. MRs 0. ICH score 2.     SUBJECTIVE: No events overnight.  No new neurologic complaints.  ROS reported negative unless otherwise noted.    amLODIPine   Tablet 10 milliGRAM(s) Oral daily  dextrose 10% Bolus 125 milliLiter(s) IV Bolus once  dextrose 5%. 1000 milliLiter(s) IV Continuous <Continuous>  dextrose 5%. 1000 milliLiter(s) IV Continuous <Continuous>  dextrose 50% Injectable 25 Gram(s) IV Push once  dextrose 50% Injectable 12.5 Gram(s) IV Push once  dextrose Oral Gel 15 Gram(s) Oral once PRN  enoxaparin Injectable 40 milliGRAM(s) SubCutaneous <User Schedule>  glucagon  Injectable 1 milliGRAM(s) IntraMuscular once  hydrALAZINE Injectable 10 milliGRAM(s) IV Push every 2 hours PRN  insulin lispro (ADMELOG) corrective regimen sliding scale   SubCutaneous three times a day before meals  labetalol Injectable 10 milliGRAM(s) IV Push every 2 hours PRN  polyethylene glycol 3350 17 Gram(s) Oral daily  senna 2 Tablet(s) Oral at bedtime  sodium chloride 0.9%. 1000 milliLiter(s) IV Continuous <Continuous>      PHYSICAL EXAM:   Vital Signs Last 24 Hrs  T(C): 36.7 (02 May 2024 08:00), Max: 36.8 (01 May 2024 20:00)  T(F): 98 (02 May 2024 08:00), Max: 98.3 (01 May 2024 20:00)  HR: 77 (02 May 2024 08:00) (61 - 90)  BP: 128/80 (02 May 2024 08:00) (128/80 - 151/96)  BP(mean): 109 (01 May 2024 21:00) (99 - 111)  RR: 18 (02 May 2024 08:00) (16 - 23)  SpO2: 100% (02 May 2024 08:00) (90% - 100%)    Parameters below as of 02 May 2024 08:00  Patient On (Oxygen Delivery Method): room air    INCOMPLETE EXAM    PHYSICAL EXAM:   General: Pt appears anxious as demonstrated by shaking left leg, when asked if he is nervous, responds "yes a little bit"' can stop shaking on command. Patient is sitting up and awake in his chair , appears more alert today.  HEENT: right eye opacified, Head normocephalic, atraumatic. Conjunctivae clear w/o exudates or hemorrhage.  Respiratory: Chest wall symmetric, nontender. No use of accessory muscles, no audible wheezes or rhonchi on auscultation  Abdominal: Soft, nondistended, nontender. Bowel sounds present to auscultation in all 4 quadrants.   Skin: Skin is warm, dry and intact without rashes or lesions.   Extremities: No edema    Detailed Neurologic Exam:    Mental status: The patient is awake in bed, oriented to self only - answers age "69", responds "no" when asked if he knows he is in the hospital, following simple commands and cross midline with some hesitation, more conversant today    Cranial nerves: Pupils equal and react symmetrically to light. R eye blind and opacified. Extraocular motion is full with no nystagmus. There is no ptosis. Mild right facial palsy , palate elevates symmetrically. Tongue is midline.    Motor: There is normal bulk and tone.  There is no tremor.  Strength is 4+/5 right deltoid, 3+/5 right elbow, 4+/5 right hand, 5/5 right leg    Strength is 5/5 in the left arm and leg. Intermittent shaking of left leg, can stop on command     Sensation: Intact to light touch in 4 extremities, no extinction     Cerebellar: no gross ataxia appreciated, is in proportion to weakness     Gait : deferred    LABS:     04-25 Chol 156 LDL -96- HDL 48 Trig 59    A1C: 5.6  ECHO:    ANTONIO W or WO Ultrasound Enhancing Agent (04.29.24 @ 13:05)    1. Left ventricular systolic function is normal with an ejection fraction visually estimated at 60 to 65 %.   2. Mild left ventricular hypertrophy.   3. Normal right ventricular cavity size and normal systolic function, inadequate estimation of RVSP.   4. The left atrium is moderately dilated.   5. No evidence of left atrial appendage thrombus. The left atrial appendage emptying velocity is normal.   6. Flailed posterior mitral leaflet (P3 scallop) with severe eccentric mitral regurgitation, jet is eccentric anteriorly directed, flailed gap of 0.46 cm.   7. Patent foramen ovale with left to right shunting by color flow Doppler.   8. Mild simple atheromas at the aortic arch.   9. Trace pericardial effusion.  10. Compared to the transthoracic echocardiogram performed on 4/26/2024, no evidence of vegetation or intracardiac abscess, mobile mitral valve structure due to flailed posterior mitral leaflet. .    TTE W or WO Ultrasound Enhancing Agent (04.26.24 @ 12:43)   CONCLUSIONS:    1. Left ventricular cavity is normal in size. Left ventricular systolic function is normal with an ejection fraction visually estimated at 55 to 60 %.   2. Severe asymmetric left ventricular hypertrophy.   3. Normal right ventricular cavity size and normal systolic function.   4. The left atrium is severely dilated.   5. The right atrium is normal in size.   6. Thickened mitral valve leaflets.   7. Mobile structure noted on the posterior mitral valve leaflet may represent ruptured chordae. Cannot rule out vegetation.   8. Severe mitral regurgitation.   9. Trileaflet aortic valve with normal systolic excursion.  10. Mild aortic regurgitation.  11. Mild pulmonic regurgitation.  12. No pericardial effusion seen.  13. Estimated pulmonary artery systolic pressure is 59 mmHg, consistent with moderate-to-severe pulmonary hypertension.    IMAGING: Reviewed by me.     Neuro-Imaging    MR Head w/wo IV Cont (04.30.24 @ 08:34)   Extensive parenchymal volume loss and chronic microvessel ischemic changes are identified  There is evidence of abnormal signal involving the left thalamus/corona   radiata/basal ganglia region. This corresponds acute parenchymal   hemorrhage seen on the prior head CT. Surrounding edema is identified No   definite areas of abnormal enhancement is seen though continued close   interval is recommended if underlying lesion is still suspected. This   area of hemorrhage measures approximately 3.1 x 1.9 cm  Numerous areas of abnormal susceptibility are seen throughout the   posterior fossa and supratentorial region. This finding could be   compatible with areas of old hemorrhage, possibly secondary to amyloid   angiopathy though the possibility of areas of mineralization or small   cavernous angiomas must be considered.  Evaluation of the diffusion weighted sequence demonstrates no abnormal   areas of restricted diffusion to suggest acute infarct.  The large vessels demonstrate normal flow voids  Opacification of the right sphenoid and right ethmoid sinus is identified   mucosal thickening. Similar finding is seen involving the right frontal   sinus. Air-fluid level seen involving the right maxillary sinus . Minimal   mucosal considered in the left maxillary sinus.  Mild inflammatory change involving right mastoid region.    IMPRESSION: Parenchymal hemorrhage again seen.  Numerous areas of abnormal susceptibility as described above.    CT Head No Cont (04.30.24 @ 01:23)   Impression: Stable acute parenchymal hemorrhage with slightly decreased   intraventricular hemorrhage.    CT Head No Cont (04.26.24 @ 06:16)   IMPRESSION:  Left thalamocapsular hemorrhage is stable    CT Head No Cont (04.25.24 @ 10:31)   1.  Grossly stable left thalamic hemorrhage.  2.  Midline shift to the right of approximately 0.4 cm.  3.  Chronic ischemic changes.  4.  Redemonstrated partially calcified masslike density in the right   nasal passages/ethmoid air cells, grossly stable.    (04.25.24 @ 04:26)   CTA NECK:  No significant stenosis of the cervical carotid arteries based   on NASCET criteria. Patent cervical vertebral arteries. No evidence of   cervical carotid or vertebral artery dissection.    Polypoid cystic and partially calcified mass in the right nasal passage   with protrudes into the knee nasopharynx and involves the right ethmoid   air cells. Near complete opacification of the right frontal sinus, right   ethmoid air cells, and right sphenoid sinus. Direct endoscopic   visualization is recommended. Correlation with outside prior imaging   would be helpful.    Mild pulmonary interstitial lung edema and small bilateral pleural   effusions.    CTA HEAD:  No high-grade stenosis or occlusion of the major proximal   arterial branches, however, short segment severe stenosis/occlusion of   the right posterior cerebral artery proximal P3 branch. No evidence of an   intracranial arterial aneurysm or arteriovenous malformation on CTA,   specifically, no evidence of vascular malformation in the region of the   left thalamic hemorrhage. No evidence of active extravasation of contrast   within the left thalamic parenchymal hemorrhage.    ULTRASOUND    US Duplex Venous Lower Ext Complete, Bilateral (04.28.24 @ 15:58)   No acute DVT of the lower extremities.   Preliminary note, official recommendations pending attending review/signature   Seen and examined by Stroke team attending/team, assessment/ plan as discussed with stroke team attending/team as noted.     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 Patient LKW was about 0200 when he was headed into work that morning. 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 EMS was then called. Patient came in as code stroke. Per son patient takes asa daily for his heart and is unsure of any other medications or medical history. Son reports patient had been complaining of HA for about 2-3 weeks. In the ED /137. CTH showed LEFT thalamic IPH. NIHSS at time of examination was 17. Patient started on Cardene drip. MRs 0. ICH score 2.     SUBJECTIVE: No events overnight.  No new neurologic complaints.  ROS reported negative unless otherwise noted.    amLODIPine   Tablet 10 milliGRAM(s) Oral daily  dextrose 10% Bolus 125 milliLiter(s) IV Bolus once  dextrose 5%. 1000 milliLiter(s) IV Continuous <Continuous>  dextrose 5%. 1000 milliLiter(s) IV Continuous <Continuous>  dextrose 50% Injectable 25 Gram(s) IV Push once  dextrose 50% Injectable 12.5 Gram(s) IV Push once  dextrose Oral Gel 15 Gram(s) Oral once PRN  enoxaparin Injectable 40 milliGRAM(s) SubCutaneous <User Schedule>  glucagon  Injectable 1 milliGRAM(s) IntraMuscular once  hydrALAZINE Injectable 10 milliGRAM(s) IV Push every 2 hours PRN  insulin lispro (ADMELOG) corrective regimen sliding scale   SubCutaneous three times a day before meals  labetalol Injectable 10 milliGRAM(s) IV Push every 2 hours PRN  polyethylene glycol 3350 17 Gram(s) Oral daily  senna 2 Tablet(s) Oral at bedtime  sodium chloride 0.9%. 1000 milliLiter(s) IV Continuous <Continuous>      PHYSICAL EXAM:   Vital Signs Last 24 Hrs  T(C): 36.7 (02 May 2024 08:00), Max: 36.8 (01 May 2024 20:00)  T(F): 98 (02 May 2024 08:00), Max: 98.3 (01 May 2024 20:00)  HR: 77 (02 May 2024 08:00) (61 - 90)  BP: 128/80 (02 May 2024 08:00) (128/80 - 151/96)  BP(mean): 109 (01 May 2024 21:00) (99 - 111)  RR: 18 (02 May 2024 08:00) (16 - 23)  SpO2: 100% (02 May 2024 08:00) (90% - 100%)    Parameters below as of 02 May 2024 08:00  Patient On (Oxygen Delivery Method): room air      PHYSICAL EXAM:   General: Pt continues to appear anxious today as demonstrated by shaking left leg. Patient is seen awake in bed.  HEENT: right eye opacified, head normocephalic, atraumatic. Conjunctivae clear w/o exudates or hemorrhage.  Respiratory: Chest wall symmetric, nontender. No use of accessory muscles, no audible wheezes or rhonchi on auscultation  Abdominal: Soft, nondistended, nontender. Bowel sounds present to auscultation in all 4 quadrants.   Skin: Skin is warm, dry and intact without rashes or lesions.   Extremities: No edema    Detailed Neurologic Exam:    Mental status: The patient is awake in bed, oriented to self only, only oriented to place with choice, cannot tell us why he is here and is unable to tell us his son's name. Following simple commands and can cross midline with some hesitation; conversing more, less aphasic, speaking more clearly today.    Cranial nerves: Pupils equal and react symmetrically to light. R eye blind and opacified. Extraocular motion is full with no nystagmus. There is no ptosis. Mild right facial palsy , palate elevates symmetrically. Tongue is midline.     Motor: There is normal bulk and tone.  There is no tremor.  Strength is overall improved: 5-/5 right deltoid, 5/5 right elbow, 5/5 right hand, 5/5 right leg    Strength is 4+/5 in the left arm and 5/5 in the left leg. Intermittent shaking of left leg, can stop on command     Sensation: sensation greater on LUE>RUE, intact in the lower extremities b/l, no extinction     Cerebellar: no gross ataxia appreciated, is in proportion to weakness     Gait : deferred    LABS:     04-25 Chol 156 LDL -96- HDL 48 Trig 59    A1C: 5.6  ECHO:    ANTONIO W or WO Ultrasound Enhancing Agent (04.29.24 @ 13:05)    1. Left ventricular systolic function is normal with an ejection fraction visually estimated at 60 to 65 %.   2. Mild left ventricular hypertrophy.   3. Normal right ventricular cavity size and normal systolic function, inadequate estimation of RVSP.   4. The left atrium is moderately dilated.   5. No evidence of left atrial appendage thrombus. The left atrial appendage emptying velocity is normal.   6. Flailed posterior mitral leaflet (P3 scallop) with severe eccentric mitral regurgitation, jet is eccentric anteriorly directed, flailed gap of 0.46 cm.   7. Patent foramen ovale with left to right shunting by color flow Doppler.   8. Mild simple atheromas at the aortic arch.   9. Trace pericardial effusion.  10. Compared to the transthoracic echocardiogram performed on 4/26/2024, no evidence of vegetation or intracardiac abscess, mobile mitral valve structure due to flailed posterior mitral leaflet. .    TTE W or WO Ultrasound Enhancing Agent (04.26.24 @ 12:43)   CONCLUSIONS:    1. Left ventricular cavity is normal in size. Left ventricular systolic function is normal with an ejection fraction visually estimated at 55 to 60 %.   2. Severe asymmetric left ventricular hypertrophy.   3. Normal right ventricular cavity size and normal systolic function.   4. The left atrium is severely dilated.   5. The right atrium is normal in size.   6. Thickened mitral valve leaflets.   7. Mobile structure noted on the posterior mitral valve leaflet may represent ruptured chordae. Cannot rule out vegetation.   8. Severe mitral regurgitation.   9. Trileaflet aortic valve with normal systolic excursion.  10. Mild aortic regurgitation.  11. Mild pulmonic regurgitation.  12. No pericardial effusion seen.  13. Estimated pulmonary artery systolic pressure is 59 mmHg, consistent with moderate-to-severe pulmonary hypertension.    IMAGING: Reviewed by me.     Neuro-Imaging    MR Head w/wo IV Cont (04.30.24 @ 08:34)   Extensive parenchymal volume loss and chronic microvessel ischemic changes are identified  There is evidence of abnormal signal involving the left thalamus/corona   radiata/basal ganglia region. This corresponds acute parenchymal   hemorrhage seen on the prior head CT. Surrounding edema is identified No   definite areas of abnormal enhancement is seen though continued close   interval is recommended if underlying lesion is still suspected. This   area of hemorrhage measures approximately 3.1 x 1.9 cm  Numerous areas of abnormal susceptibility are seen throughout the   posterior fossa and supratentorial region. This finding could be   compatible with areas of old hemorrhage, possibly secondary to amyloid   angiopathy though the possibility of areas of mineralization or small   cavernous angiomas must be considered.  Evaluation of the diffusion weighted sequence demonstrates no abnormal   areas of restricted diffusion to suggest acute infarct.  The large vessels demonstrate normal flow voids  Opacification of the right sphenoid and right ethmoid sinus is identified   mucosal thickening. Similar finding is seen involving the right frontal   sinus. Air-fluid level seen involving the right maxillary sinus . Minimal   mucosal considered in the left maxillary sinus.  Mild inflammatory change involving right mastoid region.    IMPRESSION: Parenchymal hemorrhage again seen.  Numerous areas of abnormal susceptibility as described above.    CT Head No Cont (04.30.24 @ 01:23)   Impression: Stable acute parenchymal hemorrhage with slightly decreased   intraventricular hemorrhage.    CT Head No Cont (04.26.24 @ 06:16)   IMPRESSION:  Left thalamocapsular hemorrhage is stable    CT Head No Cont (04.25.24 @ 10:31)   1.  Grossly stable left thalamic hemorrhage.  2.  Midline shift to the right of approximately 0.4 cm.  3.  Chronic ischemic changes.  4.  Redemonstrated partially calcified masslike density in the right   nasal passages/ethmoid air cells, grossly stable.    (04.25.24 @ 04:26)   CTA NECK:  No significant stenosis of the cervical carotid arteries based   on NASCET criteria. Patent cervical vertebral arteries. No evidence of   cervical carotid or vertebral artery dissection.    Polypoid cystic and partially calcified mass in the right nasal passage   with protrudes into the knee nasopharynx and involves the right ethmoid   air cells. Near complete opacification of the right frontal sinus, right   ethmoid air cells, and right sphenoid sinus. Direct endoscopic   visualization is recommended. Correlation with outside prior imaging   would be helpful.    Mild pulmonary interstitial lung edema and small bilateral pleural   effusions.    CTA HEAD:  No high-grade stenosis or occlusion of the major proximal   arterial branches, however, short segment severe stenosis/occlusion of   the right posterior cerebral artery proximal P3 branch. No evidence of an   intracranial arterial aneurysm or arteriovenous malformation on CTA,   specifically, no evidence of vascular malformation in the region of the   left thalamic hemorrhage. No evidence of active extravasation of contrast   within the left thalamic parenchymal hemorrhage.    ULTRASOUND    US Duplex Venous Lower Ext Complete, Bilateral (04.28.24 @ 15:58)   No acute DVT of the lower extremities.     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 Patient LKW was about 0200 when he was headed into work that morning. 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 EMS was then called. Patient came in as code stroke. Per son patient takes asa daily for his heart and is unsure of any other medications or medical history. Son reports patient had been complaining of HA for about 2-3 weeks. In the ED /137. CTH showed LEFT thalamic IPH. NIHSS at time of examination was 17. Patient started on Cardene drip. MRs 0. ICH score 2.     SUBJECTIVE: No events overnight.  No new neurologic complaints.  ROS reported negative unless otherwise noted.    amLODIPine   Tablet 10 milliGRAM(s) Oral daily  dextrose 10% Bolus 125 milliLiter(s) IV Bolus once  dextrose 5%. 1000 milliLiter(s) IV Continuous <Continuous>  dextrose 5%. 1000 milliLiter(s) IV Continuous <Continuous>  dextrose 50% Injectable 25 Gram(s) IV Push once  dextrose 50% Injectable 12.5 Gram(s) IV Push once  dextrose Oral Gel 15 Gram(s) Oral once PRN  enoxaparin Injectable 40 milliGRAM(s) SubCutaneous <User Schedule>  glucagon  Injectable 1 milliGRAM(s) IntraMuscular once  hydrALAZINE Injectable 10 milliGRAM(s) IV Push every 2 hours PRN  insulin lispro (ADMELOG) corrective regimen sliding scale   SubCutaneous three times a day before meals  labetalol Injectable 10 milliGRAM(s) IV Push every 2 hours PRN  polyethylene glycol 3350 17 Gram(s) Oral daily  senna 2 Tablet(s) Oral at bedtime  sodium chloride 0.9%. 1000 milliLiter(s) IV Continuous <Continuous>      PHYSICAL EXAM:   Vital Signs Last 24 Hrs  T(C): 36.7 (02 May 2024 08:00), Max: 36.8 (01 May 2024 20:00)  T(F): 98 (02 May 2024 08:00), Max: 98.3 (01 May 2024 20:00)  HR: 77 (02 May 2024 08:00) (61 - 90)  BP: 128/80 (02 May 2024 08:00) (128/80 - 151/96)  BP(mean): 109 (01 May 2024 21:00) (99 - 111)  RR: 18 (02 May 2024 08:00) (16 - 23)  SpO2: 100% (02 May 2024 08:00) (90% - 100%)    Parameters below as of 02 May 2024 08:00  Patient On (Oxygen Delivery Method): room air      PHYSICAL EXAM:   General: Pt continues to appear anxious today as demonstrated by shaking left leg. Patient is seen awake in bed.  HEENT: right eye opacified, head normocephalic, atraumatic. Conjunctivae clear w/o exudates or hemorrhage.  Respiratory: Chest wall symmetric, nontender. No use of accessory muscles, no audible wheezes or rhonchi on auscultation  Abdominal: Soft, nondistended, nontender. Bowel sounds present to auscultation in all 4 quadrants.   Skin: Skin is warm, dry and intact without rashes or lesions.   Extremities: No edema    Detailed Neurologic Exam:    Mental status: The patient is awake in bed, oriented to self only, only oriented to place with choice, cannot tell us why he is here and is unable to tell us his son's name. Following simple commands and can cross midline with some hesitation; conversing more, less aphasic, speaking more clearly today.    Cranial nerves: Pupils equal and react symmetrically to light. R eye blind and opacified. Extraocular motion is full with no nystagmus. There is no ptosis. Mild right facial palsy , palate elevates symmetrically. Tongue is midline.     Motor: There is normal bulk and tone.  There is no tremor.  Strength is overall improved: 5-/5 right deltoid, 5/5 right elbow, 5/5 right hand, 5/5 right leg    Strength is 4+/5 in the left arm and 5/5 in the left leg. Intermittent shaking of left leg, can stop on command     Sensation: sensation greater on LUE>RUE, intact in the lower extremities b/l, no extinction     Cerebellar: no gross ataxia appreciated, is in proportion to weakness     Gait : deferred    LABS:     04-25 Chol 156 LDL -96- HDL 48 Trig 59    A1C: 5.6  ECHO:    ANTONIO W or WO Ultrasound Enhancing Agent (04.29.24 @ 13:05)    1. Left ventricular systolic function is normal with an ejection fraction visually estimated at 60 to 65 %.   2. Mild left ventricular hypertrophy.   3. Normal right ventricular cavity size and normal systolic function, inadequate estimation of RVSP.   4. The left atrium is moderately dilated.   5. No evidence of left atrial appendage thrombus. The left atrial appendage emptying velocity is normal.   6. Flailed posterior mitral leaflet (P3 scallop) with severe eccentric mitral regurgitation, jet is eccentric anteriorly directed, flailed gap of 0.46 cm.   7. Patent foramen ovale with left to right shunting by color flow Doppler.   8. Mild simple atheromas at the aortic arch.   9. Trace pericardial effusion.  10. Compared to the transthoracic echocardiogram performed on 4/26/2024, no evidence of vegetation or intracardiac abscess, mobile mitral valve structure due to flailed posterior mitral leaflet. .    TTE W or WO Ultrasound Enhancing Agent (04.26.24 @ 12:43)   CONCLUSIONS:    1. Left ventricular cavity is normal in size. Left ventricular systolic function is normal with an ejection fraction visually estimated at 55 to 60 %.   2. Severe asymmetric left ventricular hypertrophy.   3. Normal right ventricular cavity size and normal systolic function.   4. The left atrium is severely dilated.   5. The right atrium is normal in size.   6. Thickened mitral valve leaflets.   7. Mobile structure noted on the posterior mitral valve leaflet may represent ruptured chordae. Cannot rule out vegetation.   8. Severe mitral regurgitation.   9. Trileaflet aortic valve with normal systolic excursion.  10. Mild aortic regurgitation.  11. Mild pulmonic regurgitation.  12. No pericardial effusion seen.  13. Estimated pulmonary artery systolic pressure is 59 mmHg, consistent with moderate-to-severe pulmonary hypertension.    IMAGING: Reviewed by me.     Neuro-Imaging    MR Head w/wo IV Cont (04.30.24 @ 08:34)   Extensive parenchymal volume loss and chronic microvessel ischemic changes are identified  There is evidence of abnormal signal involving the left thalamus/corona   radiata/basal ganglia region. This corresponds acute parenchymal   hemorrhage seen on the prior head CT. Surrounding edema is identified No   definite areas of abnormal enhancement is seen though continued close   interval is recommended if underlying lesion is still suspected. This   area of hemorrhage measures approximately 3.1 x 1.9 cm  Numerous areas of abnormal susceptibility are seen throughout the   posterior fossa and supratentorial region. This finding could be   compatible with areas of old hemorrhage, possibly secondary to amyloid   angiopathy though the possibility of areas of mineralization or small   cavernous angiomas must be considered.  Evaluation of the diffusion weighted sequence demonstrates no abnormal   areas of restricted diffusion to suggest acute infarct.  The large vessels demonstrate normal flow voids  Opacification of the right sphenoid and right ethmoid sinus is identified   mucosal thickening. Similar finding is seen involving the right frontal   sinus. Air-fluid level seen involving the right maxillary sinus . Minimal   mucosal considered in the left maxillary sinus.  Mild inflammatory change involving right mastoid region.    IMPRESSION: Parenchymal hemorrhage again seen.  Numerous areas of abnormal susceptibility as described above.    CT Head No Cont (04.30.24 @ 01:23)   Impression: Stable acute parenchymal hemorrhage with slightly decreased   intraventricular hemorrhage.    CT Head No Cont (04.26.24 @ 06:16)   IMPRESSION:  Left thalamocapsular hemorrhage is stable    CT Head No Cont (04.25.24 @ 10:31)   1.  Grossly stable left thalamic hemorrhage.  2.  Midline shift to the right of approximately 0.4 cm.  3.  Chronic ischemic changes.  4.  Redemonstrated partially calcified masslike density in the right   nasal passages/ethmoid air cells, grossly stable.    (04.25.24 @ 04:26)   CTA NECK:  No significant stenosis of the cervical carotid arteries based   on NASCET criteria. Patent cervical vertebral arteries. No evidence of   cervical carotid or vertebral artery dissection.    Polypoid cystic and partially calcified mass in the right nasal passage   with protrudes into the knee nasopharynx and involves the right ethmoid   air cells. Near complete opacification of the right frontal sinus, right   ethmoid air cells, and right sphenoid sinus. Direct endoscopic   visualization is recommended. Correlation with outside prior imaging   would be helpful.    Mild pulmonary interstitial lung edema and small bilateral pleural   effusions.    CTA HEAD:  No high-grade stenosis or occlusion of the major proximal   arterial branches, however, short segment severe stenosis/occlusion of   the right posterior cerebral artery proximal P3 branch. No evidence of an   intracranial arterial aneurysm or arteriovenous malformation on CTA,   specifically, no evidence of vascular malformation in the region of the   left thalamic hemorrhage. No evidence of active extravasation of contrast   within the left thalamic parenchymal hemorrhage.    ULTRASOUND    US Duplex Venous Lower Ext Complete, Bilateral (04.28.24 @ 15:58)   No acute DVT of the lower extremities.

## 2024-05-02 NOTE — PROGRESS NOTE ADULT - SUBJECTIVE AND OBJECTIVE BOX
Attention and participation improving   No complaints of HA/SOB/CP/Weakness   Vitals stable, afebrile  Strong, moving all four, dysmetria on the right     FUNCTIONAL PROGRESS   PT  Bed Mobility  Bed Mobility Training Sit-to-Supine: moderate assist (50% patient effort)  Bed Mobility Training Supine-to-Sit: moderate assist (50% patient effort)    Sit-Stand Transfer Training  Transfer Training Sit-to-Stand Transfer: moderate assist (50% patient effort)  Transfer Training Stand-to-Sit Transfer: moderate assist (50% patient effort)    Gait Training  Gait Trainin feet RW modA  Gait Analysis: unsteadiness throughout, posterior trunk lean, ataxic gait pattern, verbal cues for sequencing      VITALS  T(C): 36.7 (24 @ 08:00), Max: 36.8 (24 @ 20:00)  HR: 77 (24 @ 08:00) (62 - 90)  BP: 128/80 (24 @ 08:00) (128/80 - 151/96)  RR: 18 (24 @ 08:00) (18 - 23)  SpO2: 100% (24 @ 08:00) (90% - 100%)  Wt(kg): --    MEDICATIONS   amLODIPine   Tablet 10 milliGRAM(s) daily  dextrose 10% Bolus 125 milliLiter(s) once  dextrose 5%. 1000 milliLiter(s) <Continuous>  dextrose 5%. 1000 milliLiter(s) <Continuous>  dextrose 50% Injectable 12.5 Gram(s) once  dextrose 50% Injectable 25 Gram(s) once  dextrose Oral Gel 15 Gram(s) once PRN  enoxaparin Injectable 40 milliGRAM(s) <User Schedule>  glucagon  Injectable 1 milliGRAM(s) once  hydrALAZINE Injectable 10 milliGRAM(s) every 2 hours PRN  insulin lispro (ADMELOG) corrective regimen sliding scale   three times a day before meals  labetalol Injectable 10 milliGRAM(s) every 2 hours PRN  polyethylene glycol 3350 17 Gram(s) daily  senna 2 Tablet(s) at bedtime  sodium chloride 0.9%. 1000 milliLiter(s) <Continuous>      RECENT LABS/IMAGING  - Reviewed Today      CT Head  - Left thalamocapsular hemorrhage is stable      CT Head  - 1.  Grossly stable left thalamic hemorrhage. 2.  Midline shift to the right of approximately 0.4 cm. 3.  Chronic ischemic changes. 4.  Redemonstrated partially calcified masslike density in the right  nasal passages/ethmoid air cells, grossly stable.      CTA NECK & HEAD  -   No significant stenosis of the cervical carotid arteries based on NASCET criteria. Patent cervical vertebral arteries. No evidence of cervical carotid or vertebral artery dissection. Polypoid cystic and partially calcified mass in the right nasal passage with protrudes into the knee nasopharynx and involves the right ethmoid  air cells. Near complete opacification of the right frontal sinus, right ethmoid air cells, and right sphenoid sinus. Direct endoscopic visualization is recommended. Correlation with outside prior imaging  would be helpful. Mild pulmonary interstitial lung edema and small bilateral pleural effusions. CTA HEAD:  No high-grade stenosis or occlusion of the major proximal  arterial branches, however, short segment severe stenosis/occlusion of the right posterior cerebral artery proximal P3 branch. No evidence of an intracranial arterial aneurysm or arteriovenous malformation on CTA, specifically, no evidence of vascular malformation in the region of the left thalamic hemorrhage. No evidence of active extravasation of contrast within the left thalamic parenchymal hemorrhage.        TTE  -  1. Left ventricular cavity is normal in size. Left ventricular systolic function is normal with an ejection fraction visually estimated at 55 to 60 %.   2. Severe asymmetric left ventricular hypertrophy.   3. Normal right ventricular cavity size and normal systolic function.   4. The left atrium is severely dilated.   5. The right atrium is normal in size.   6. Thickened mitral valve leaflets.   7. Mobile structure noted on the posterior mitral valve leaflet may represent ruptured chordae. Cannot rule out vegetation.   8. Severe mitral regurgitation.   9. Trileaflet aortic valve with normal systolic excursion.  10. Mild aortic regurgitation.  11. Mild pulmonic regurgitation.  12. No pericardial effusion seen.  13. Estimated pulmonary artery systolic pressure is 59 mmHg, consistent with moderate-to-severe pulmonary hypertension.    HEAD CT  - Stable acute parenchymal hemorrhage with slightly decreased intraventricular hemorrhage.    MRI HEAD  - Parenchymal hemorrhage again seen. Numerous areas of abnormal susceptibility as described above.    EEG  - Abnormal EEG study - Focal intermittent left frontotemporal slowing. Mild generalized background slowing.  Periods of lower extremity shaking noted, without any correlating abnormality on EEG recording. Clinical Impression:  Focal cerebral dysfunction in left frontotemporal Mild diffuse/multi-focal cerebral dysfunction, not specific as to etiology.  There were no epileptiform abnormalities recorded.  Periods of lower extremity shaking, likely non epileptic, clinical correlation recommended.  ----------------------------------------------------------------------------------------  PHYSICAL EXAM  Constitutional - NAD, Comfortable  Extremities - No edema   Neurologic Exam -        Cognitive - AAOx self, being in hospital, PART situation      Communication - Expressive/Receptive deficits     Cranial Nerves - Right lip droop      FUNCTIONAL MOTOR EXAM - Noted right SH FLEXION and KE weakness with slowed movement, Right/Left discrimination impairments     Sensory - ?Left decrease to LT  Psychiatric - Restless  ----------------------------------------------------------------------------------------  ASSESSMENT/PLAN  58yMale with functional deficits after developing a intraparenchymal hemorrhage.   Left Thalamic IPH - Stable   HTN - Amlodipine, RECOMMEND Discontinue Hydralazine and Labetalol   Sleep - RECOMMEND Melatonin 5mg   Pain - RECOMMEND Tylenol 975mg    DVT PPX - SCDs, Lovenox  Rehab/Impaired mobility and function - Continuous hospitalization is crucial for managing the patient's acute medical issues (stroke), which have significantly impacted their mobility, quality of life, and function. Rehabilitation recommendations will be based on the patient's functional progress and their ability to participate in and tolerate therapeutic interventions, which may change over time. Maintaining ongoing mobilization by the staff is imperative to prevent secondary medical complications and associated health issues related to debility.    PENDING:  Placement     RECOMMENDATIONS:  -OOB to chair daily   -Delirium precautions  -Tylenol 975mg Q8H PRN for mild pain   -Melatonin 5mg QHS   -Discontinue IV BP medications     DISPOSITION:   The patient would benefit from ACUTE inpatient rehabilitation, but due to complex psychosocial issues will most likely be discharged HOME to the community. Social work and  following patient closely and helping with safe disposition.

## 2024-05-03 LAB
CULTURE RESULTS: SIGNIFICANT CHANGE UP
CULTURE RESULTS: SIGNIFICANT CHANGE UP
GLUCOSE BLDC GLUCOMTR-MCNC: 100 MG/DL — HIGH (ref 70–99)
GLUCOSE BLDC GLUCOMTR-MCNC: 112 MG/DL — HIGH (ref 70–99)
GLUCOSE BLDC GLUCOMTR-MCNC: 114 MG/DL — HIGH (ref 70–99)
GLUCOSE BLDC GLUCOMTR-MCNC: 177 MG/DL — HIGH (ref 70–99)
SPECIMEN SOURCE: SIGNIFICANT CHANGE UP
SPECIMEN SOURCE: SIGNIFICANT CHANGE UP

## 2024-05-03 PROCEDURE — 99233 SBSQ HOSP IP/OBS HIGH 50: CPT | Mod: GC

## 2024-05-03 PROCEDURE — 99232 SBSQ HOSP IP/OBS MODERATE 35: CPT

## 2024-05-03 RX ORDER — LISINOPRIL 2.5 MG/1
10 TABLET ORAL DAILY
Refills: 0 | Status: DISCONTINUED | OUTPATIENT
Start: 2024-05-03 | End: 2024-05-04

## 2024-05-03 RX ORDER — LISINOPRIL 2.5 MG/1
1 TABLET ORAL
Qty: 30 | Refills: 0
Start: 2024-05-03

## 2024-05-03 RX ORDER — AMLODIPINE BESYLATE 2.5 MG/1
1 TABLET ORAL
Qty: 30 | Refills: 0
Start: 2024-05-03

## 2024-05-03 RX ADMIN — AMLODIPINE BESYLATE 10 MILLIGRAM(S): 2.5 TABLET ORAL at 05:43

## 2024-05-03 RX ADMIN — POLYETHYLENE GLYCOL 3350 17 GRAM(S): 17 POWDER, FOR SOLUTION ORAL at 10:30

## 2024-05-03 RX ADMIN — SENNA PLUS 2 TABLET(S): 8.6 TABLET ORAL at 21:36

## 2024-05-03 RX ADMIN — Medication 1: at 13:00

## 2024-05-03 RX ADMIN — ENOXAPARIN SODIUM 40 MILLIGRAM(S): 100 INJECTION SUBCUTANEOUS at 21:36

## 2024-05-03 RX ADMIN — LISINOPRIL 10 MILLIGRAM(S): 2.5 TABLET ORAL at 10:30

## 2024-05-03 NOTE — PROGRESS NOTE ADULT - SUBJECTIVE AND OBJECTIVE BOX
Preliminary note, official recommendations pending attending review/signature   Seen and examined by Stroke team attending/team, assessment/ plan as discussed with stroke team attending/team as noted.     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 Patient LKW was about 0200 when he was headed into work that morning. 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 EMS was then called. Patient came in as code stroke. Per son patient takes asa daily for his heart and is unsure of any other medications or medical history. Son reports patient had been complaining of HA for about 2-3 weeks. In the ED /137. CTH showed LEFT thalamic IPH. NIHSS at time of examination was 17. Patient started on Cardene drip. MRs 0. ICH score 2.     SUBJECTIVE: No events overnight.  No new neurologic complaints.  ROS reported negative unless otherwise noted.    amLODIPine   Tablet 10 milliGRAM(s) Oral daily  dextrose 10% Bolus 125 milliLiter(s) IV Bolus once  dextrose 5%. 1000 milliLiter(s) IV Continuous <Continuous>  dextrose 5%. 1000 milliLiter(s) IV Continuous <Continuous>  dextrose 50% Injectable 12.5 Gram(s) IV Push once  dextrose 50% Injectable 25 Gram(s) IV Push once  dextrose Oral Gel 15 Gram(s) Oral once PRN  enoxaparin Injectable 40 milliGRAM(s) SubCutaneous <User Schedule>  glucagon  Injectable 1 milliGRAM(s) IntraMuscular once  insulin lispro (ADMELOG) corrective regimen sliding scale   SubCutaneous three times a day before meals  polyethylene glycol 3350 17 Gram(s) Oral daily  senna 2 Tablet(s) Oral at bedtime  sodium chloride 0.9%. 1000 milliLiter(s) IV Continuous <Continuous>      PHYSICAL EXAM:   Vital Signs Last 24 Hrs  T(C): 36.5 (03 May 2024 04:34), Max: 36.8 (02 May 2024 16:45)  T(F): 97.7 (03 May 2024 04:34), Max: 98.3 (02 May 2024 16:45)  HR: 79 (03 May 2024 04:34) (74 - 84)  BP: 154/89 (03 May 2024 04:34) (150/84 - 160/89)  BP(mean): --  RR: 18 (03 May 2024 04:34) (17 - 18)  SpO2: 98% (03 May 2024 04:34) (96% - 98%)    Parameters below as of 03 May 2024 04:34  Patient On (Oxygen Delivery Method): room air    INCOMPLETE EXAM    PHYSICAL EXAM:   General: Pt continues to appear anxious today as demonstrated by shaking left leg. Patient is seen awake in bed.  HEENT: right eye opacified, head normocephalic, atraumatic. Conjunctivae clear w/o exudates or hemorrhage.  Respiratory: Chest wall symmetric, nontender. No use of accessory muscles, no audible wheezes or rhonchi on auscultation  Abdominal: Soft, nondistended, nontender. Bowel sounds present to auscultation in all 4 quadrants.   Skin: Skin is warm, dry and intact without rashes or lesions.   Extremities: No edema    Detailed Neurologic Exam:    Mental status: The patient is awake in bed, oriented to self only, only oriented to place with choice, cannot tell us why he is here and is unable to tell us his son's name. Following simple commands and can cross midline with some hesitation; conversing more, less aphasic, speaking more clearly today.    Cranial nerves: Pupils equal and react symmetrically to light. R eye blind and opacified. Extraocular motion is full with no nystagmus. There is no ptosis. Mild right facial palsy , palate elevates symmetrically. Tongue is midline.     Motor: There is normal bulk and tone.  There is no tremor.  Strength is overall improved: 5-/5 right deltoid, 5/5 right elbow, 5/5 right hand, 5/5 right leg    Strength is 4+/5 in the left arm and 5/5 in the left leg. Intermittent shaking of left leg, can stop on command     Sensation: sensation greater on LUE>RUE, intact in the lower extremities b/l, no extinction     Cerebellar: no gross ataxia appreciated, is in proportion to weakness     Gait : deferred      LABS:     04-25 Chol 156 LDL -96- HDL 48 Trig 59    A1C: 5.6      ECHO:    ANTONIO W or WO Ultrasound Enhancing Agent (04.29.24 @ 13:05)    1. Left ventricular systolic function is normal with an ejection fraction visually estimated at 60 to 65 %.   2. Mild left ventricular hypertrophy.   3. Normal right ventricular cavity size and normal systolic function, inadequate estimation of RVSP.   4. The left atrium is moderately dilated.   5. No evidence of left atrial appendage thrombus. The left atrial appendage emptying velocity is normal.   6. Flailed posterior mitral leaflet (P3 scallop) with severe eccentric mitral regurgitation, jet is eccentric anteriorly directed, flailed gap of 0.46 cm.   7. Patent foramen ovale with left to right shunting by color flow Doppler.   8. Mild simple atheromas at the aortic arch.   9. Trace pericardial effusion.  10. Compared to the transthoracic echocardiogram performed on 4/26/2024, no evidence of vegetation or intracardiac abscess, mobile mitral valve structure due to flailed posterior mitral leaflet. .    TTE W or WO Ultrasound Enhancing Agent (04.26.24 @ 12:43)   CONCLUSIONS:    1. Left ventricular cavity is normal in size. Left ventricular systolic function is normal with an ejection fraction visually estimated at 55 to 60 %.   2. Severe asymmetric left ventricular hypertrophy.   3. Normal right ventricular cavity size and normal systolic function.   4. The left atrium is severely dilated.   5. The right atrium is normal in size.   6. Thickened mitral valve leaflets.   7. Mobile structure noted on the posterior mitral valve leaflet may represent ruptured chordae. Cannot rule out vegetation.   8. Severe mitral regurgitation.   9. Trileaflet aortic valve with normal systolic excursion.  10. Mild aortic regurgitation.  11. Mild pulmonic regurgitation.  12. No pericardial effusion seen.  13. Estimated pulmonary artery systolic pressure is 59 mmHg, consistent with moderate-to-severe pulmonary hypertension.    IMAGING: Reviewed by me.     Neuro-Imaging    MR Head w/wo IV Cont (04.30.24 @ 08:34)   Extensive parenchymal volume loss and chronic microvessel ischemic changes are identified  There is evidence of abnormal signal involving the left thalamus/corona   radiata/basal ganglia region. This corresponds acute parenchymal   hemorrhage seen on the prior head CT. Surrounding edema is identified No   definite areas of abnormal enhancement is seen though continued close   interval is recommended if underlying lesion is still suspected. This   area of hemorrhage measures approximately 3.1 x 1.9 cm  Numerous areas of abnormal susceptibility are seen throughout the   posterior fossa and supratentorial region. This finding could be   compatible with areas of old hemorrhage, possibly secondary to amyloid   angiopathy though the possibility of areas of mineralization or small   cavernous angiomas must be considered.  Evaluation of the diffusion weighted sequence demonstrates no abnormal   areas of restricted diffusion to suggest acute infarct.  The large vessels demonstrate normal flow voids  Opacification of the right sphenoid and right ethmoid sinus is identified   mucosal thickening. Similar finding is seen involving the right frontal   sinus. Air-fluid level seen involving the right maxillary sinus . Minimal   mucosal considered in the left maxillary sinus.  Mild inflammatory change involving right mastoid region.    IMPRESSION: Parenchymal hemorrhage again seen.  Numerous areas of abnormal susceptibility as described above.    CT Head No Cont (04.30.24 @ 01:23)   Impression: Stable acute parenchymal hemorrhage with slightly decreased   intraventricular hemorrhage.    CT Head No Cont (04.26.24 @ 06:16)   IMPRESSION:  Left thalamocapsular hemorrhage is stable    CT Head No Cont (04.25.24 @ 10:31)   1.  Grossly stable left thalamic hemorrhage.  2.  Midline shift to the right of approximately 0.4 cm.  3.  Chronic ischemic changes.  4.  Redemonstrated partially calcified masslike density in the right   nasal passages/ethmoid air cells, grossly stable.    (04.25.24 @ 04:26)   CTA NECK:  No significant stenosis of the cervical carotid arteries based   on NASCET criteria. Patent cervical vertebral arteries. No evidence of   cervical carotid or vertebral artery dissection.    Polypoid cystic and partially calcified mass in the right nasal passage   with protrudes into the knee nasopharynx and involves the right ethmoid   air cells. Near complete opacification of the right frontal sinus, right   ethmoid air cells, and right sphenoid sinus. Direct endoscopic   visualization is recommended. Correlation with outside prior imaging   would be helpful.    Mild pulmonary interstitial lung edema and small bilateral pleural   effusions.    CTA HEAD:  No high-grade stenosis or occlusion of the major proximal   arterial branches, however, short segment severe stenosis/occlusion of   the right posterior cerebral artery proximal P3 branch. No evidence of an   intracranial arterial aneurysm or arteriovenous malformation on CTA,   specifically, no evidence of vascular malformation in the region of the   left thalamic hemorrhage. No evidence of active extravasation of contrast   within the left thalamic parenchymal hemorrhage.    ULTRASOUND    US Duplex Venous Lower Ext Complete, Bilateral (04.28.24 @ 15:58)   No acute DVT of the lower extremities.         Preliminary note, official recommendations pending attending review/signature   Seen and examined by Stroke team attending/team, assessment/ plan as discussed with stroke team attending/team as noted.     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 Patient LKW was about 0200 when he was headed into work that morning. 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 EMS was then called. Patient came in as code stroke. Per son patient takes asa daily for his heart and is unsure of any other medications or medical history. Son reports patient had been complaining of HA for about 2-3 weeks. In the ED /137. CTH showed LEFT thalamic IPH. NIHSS at time of examination was 17. Patient started on Cardene drip. MRs 0. ICH score 2.     SUBJECTIVE: No events overnight.  No new neurologic complaints.  ROS reported negative unless otherwise noted.    amLODIPine   Tablet 10 milliGRAM(s) Oral daily  dextrose 10% Bolus 125 milliLiter(s) IV Bolus once  dextrose 5%. 1000 milliLiter(s) IV Continuous <Continuous>  dextrose 5%. 1000 milliLiter(s) IV Continuous <Continuous>  dextrose 50% Injectable 12.5 Gram(s) IV Push once  dextrose 50% Injectable 25 Gram(s) IV Push once  dextrose Oral Gel 15 Gram(s) Oral once PRN  enoxaparin Injectable 40 milliGRAM(s) SubCutaneous <User Schedule>  glucagon  Injectable 1 milliGRAM(s) IntraMuscular once  insulin lispro (ADMELOG) corrective regimen sliding scale   SubCutaneous three times a day before meals  polyethylene glycol 3350 17 Gram(s) Oral daily  senna 2 Tablet(s) Oral at bedtime  sodium chloride 0.9%. 1000 milliLiter(s) IV Continuous <Continuous>      PHYSICAL EXAM:   Vital Signs Last 24 Hrs  T(C): 36.5 (03 May 2024 04:34), Max: 36.8 (02 May 2024 16:45)  T(F): 97.7 (03 May 2024 04:34), Max: 98.3 (02 May 2024 16:45)  HR: 79 (03 May 2024 04:34) (74 - 84)  BP: 154/89 (03 May 2024 04:34) (150/84 - 160/89)  BP(mean): --  RR: 18 (03 May 2024 04:34) (17 - 18)  SpO2: 98% (03 May 2024 04:34) (96% - 98%)    Parameters below as of 03 May 2024 04:34  Patient On (Oxygen Delivery Method): room air    PHYSICAL EXAM:   General: Pt continues to appear anxious today as demonstrated by shaking left leg. Patient is seen awake and upright eating in his chair.   HEENT: Right eye opacified, head normocephalic, atraumatic. Conjunctivae clear w/o exudates or hemorrhage.  Respiratory: Chest wall symmetric, nontender. No use of accessory muscles, no audible wheezes or rhonchi on auscultation  Abdominal: Soft, nondistended, nontender. Bowel sounds present to auscultation in all 4 quadrants.   Skin: Skin is warm, dry and intact without rashes or lesions.   Extremities: No edema    Detailed Neurologic Exam:    Mental status: The patient is awake in his chair, oriented to self only; cannot tell us where he is, why he is here but can name his son's name (Michel) today. Following simple commands and can cross midline with some hesitation; continued mild aphasia, speaking more clearly overall.    Cranial nerves: Pupils equal and react symmetrically to light. R eye blind and opacified. Extraocular motion is full with no nystagmus. There is no ptosis. Mild right facial palsy , palate elevates symmetrically. Tongue is midline.     Motor: There is normal bulk and tone.  There is no tremor.  Strength is 5-/5 right deltoid, 5/5 right elbow, 5/5 right hand, 5/5 right leg    Strength is 4+/5 in the left arm and 5/5 in the left leg. Intermittent shaking of left leg, can stop on command .     Sensation: sensation greater on LUE>RUE, intact in the lower extremities b/l, no extinction     Cerebellar: no gross ataxia appreciated, is in proportion to weakness     Gait : deferred      LABS:     04-25 Chol 156 LDL -96- HDL 48 Trig 59    A1C: 5.6      ECHO:    ANTONIO W or WO Ultrasound Enhancing Agent (04.29.24 @ 13:05)    1. Left ventricular systolic function is normal with an ejection fraction visually estimated at 60 to 65 %.   2. Mild left ventricular hypertrophy.   3. Normal right ventricular cavity size and normal systolic function, inadequate estimation of RVSP.   4. The left atrium is moderately dilated.   5. No evidence of left atrial appendage thrombus. The left atrial appendage emptying velocity is normal.   6. Flailed posterior mitral leaflet (P3 scallop) with severe eccentric mitral regurgitation, jet is eccentric anteriorly directed, flailed gap of 0.46 cm.   7. Patent foramen ovale with left to right shunting by color flow Doppler.   8. Mild simple atheromas at the aortic arch.   9. Trace pericardial effusion.  10. Compared to the transthoracic echocardiogram performed on 4/26/2024, no evidence of vegetation or intracardiac abscess, mobile mitral valve structure due to flailed posterior mitral leaflet. .    TTE W or WO Ultrasound Enhancing Agent (04.26.24 @ 12:43)   CONCLUSIONS:    1. Left ventricular cavity is normal in size. Left ventricular systolic function is normal with an ejection fraction visually estimated at 55 to 60 %.   2. Severe asymmetric left ventricular hypertrophy.   3. Normal right ventricular cavity size and normal systolic function.   4. The left atrium is severely dilated.   5. The right atrium is normal in size.   6. Thickened mitral valve leaflets.   7. Mobile structure noted on the posterior mitral valve leaflet may represent ruptured chordae. Cannot rule out vegetation.   8. Severe mitral regurgitation.   9. Trileaflet aortic valve with normal systolic excursion.  10. Mild aortic regurgitation.  11. Mild pulmonic regurgitation.  12. No pericardial effusion seen.  13. Estimated pulmonary artery systolic pressure is 59 mmHg, consistent with moderate-to-severe pulmonary hypertension.    IMAGING: Reviewed by me.     Neuro-Imaging    MR Head w/wo IV Cont (04.30.24 @ 08:34)   Extensive parenchymal volume loss and chronic microvessel ischemic changes are identified  There is evidence of abnormal signal involving the left thalamus/corona   radiata/basal ganglia region. This corresponds acute parenchymal   hemorrhage seen on the prior head CT. Surrounding edema is identified No   definite areas of abnormal enhancement is seen though continued close   interval is recommended if underlying lesion is still suspected. This   area of hemorrhage measures approximately 3.1 x 1.9 cm  Numerous areas of abnormal susceptibility are seen throughout the   posterior fossa and supratentorial region. This finding could be   compatible with areas of old hemorrhage, possibly secondary to amyloid   angiopathy though the possibility of areas of mineralization or small   cavernous angiomas must be considered.  Evaluation of the diffusion weighted sequence demonstrates no abnormal   areas of restricted diffusion to suggest acute infarct.  The large vessels demonstrate normal flow voids  Opacification of the right sphenoid and right ethmoid sinus is identified   mucosal thickening. Similar finding is seen involving the right frontal   sinus. Air-fluid level seen involving the right maxillary sinus . Minimal   mucosal considered in the left maxillary sinus.  Mild inflammatory change involving right mastoid region.    IMPRESSION: Parenchymal hemorrhage again seen.  Numerous areas of abnormal susceptibility as described above.    CT Head No Cont (04.30.24 @ 01:23)   Impression: Stable acute parenchymal hemorrhage with slightly decreased   intraventricular hemorrhage.    CT Head No Cont (04.26.24 @ 06:16)   IMPRESSION:  Left thalamocapsular hemorrhage is stable    CT Head No Cont (04.25.24 @ 10:31)   1.  Grossly stable left thalamic hemorrhage.  2.  Midline shift to the right of approximately 0.4 cm.  3.  Chronic ischemic changes.  4.  Redemonstrated partially calcified masslike density in the right   nasal passages/ethmoid air cells, grossly stable.    (04.25.24 @ 04:26)   CTA NECK:  No significant stenosis of the cervical carotid arteries based   on NASCET criteria. Patent cervical vertebral arteries. No evidence of   cervical carotid or vertebral artery dissection.    Polypoid cystic and partially calcified mass in the right nasal passage   with protrudes into the knee nasopharynx and involves the right ethmoid   air cells. Near complete opacification of the right frontal sinus, right   ethmoid air cells, and right sphenoid sinus. Direct endoscopic   visualization is recommended. Correlation with outside prior imaging   would be helpful.    Mild pulmonary interstitial lung edema and small bilateral pleural   effusions.    CTA HEAD:  No high-grade stenosis or occlusion of the major proximal   arterial branches, however, short segment severe stenosis/occlusion of   the right posterior cerebral artery proximal P3 branch. No evidence of an   intracranial arterial aneurysm or arteriovenous malformation on CTA,   specifically, no evidence of vascular malformation in the region of the   left thalamic hemorrhage. No evidence of active extravasation of contrast   within the left thalamic parenchymal hemorrhage.    ULTRASOUND    US Duplex Venous Lower Ext Complete, Bilateral (04.28.24 @ 15:58)   No acute DVT of the lower extremities.         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 Patient LKW was about 0200 when he was headed into work that morning. 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 EMS was then called. Patient came in as code stroke. Per son patient takes asa daily for his heart and is unsure of any other medications or medical history. Son reports patient had been complaining of HA for about 2-3 weeks. In the ED /137. CTH showed LEFT thalamic IPH. NIHSS at time of examination was 17. Patient started on Cardene drip. MRs 0. ICH score 2.     SUBJECTIVE: No events overnight.  No new neurologic complaints.  ROS reported negative unless otherwise noted.    amLODIPine   Tablet 10 milliGRAM(s) Oral daily  dextrose 10% Bolus 125 milliLiter(s) IV Bolus once  dextrose 5%. 1000 milliLiter(s) IV Continuous <Continuous>  dextrose 5%. 1000 milliLiter(s) IV Continuous <Continuous>  dextrose 50% Injectable 12.5 Gram(s) IV Push once  dextrose 50% Injectable 25 Gram(s) IV Push once  dextrose Oral Gel 15 Gram(s) Oral once PRN  enoxaparin Injectable 40 milliGRAM(s) SubCutaneous <User Schedule>  glucagon  Injectable 1 milliGRAM(s) IntraMuscular once  insulin lispro (ADMELOG) corrective regimen sliding scale   SubCutaneous three times a day before meals  polyethylene glycol 3350 17 Gram(s) Oral daily  senna 2 Tablet(s) Oral at bedtime  sodium chloride 0.9%. 1000 milliLiter(s) IV Continuous <Continuous>      PHYSICAL EXAM:   Vital Signs Last 24 Hrs  T(C): 36.5 (03 May 2024 04:34), Max: 36.8 (02 May 2024 16:45)  T(F): 97.7 (03 May 2024 04:34), Max: 98.3 (02 May 2024 16:45)  HR: 79 (03 May 2024 04:34) (74 - 84)  BP: 154/89 (03 May 2024 04:34) (150/84 - 160/89)  BP(mean): --  RR: 18 (03 May 2024 04:34) (17 - 18)  SpO2: 98% (03 May 2024 04:34) (96% - 98%)    Parameters below as of 03 May 2024 04:34  Patient On (Oxygen Delivery Method): room air    PHYSICAL EXAM:   General: Pt continues to appear anxious today as demonstrated by shaking left leg. Patient is seen awake and upright eating in his chair.   HEENT: Right eye opacified, head normocephalic, atraumatic. Conjunctivae clear w/o exudates or hemorrhage.  Respiratory: Chest wall symmetric, nontender. No use of accessory muscles, no audible wheezes or rhonchi on auscultation  Abdominal: Soft, nondistended, nontender. Bowel sounds present to auscultation in all 4 quadrants.   Skin: Skin is warm, dry and intact without rashes or lesions.   Extremities: No edema    Detailed Neurologic Exam:    Mental status: The patient is awake in his chair, oriented to self only; cannot tell us where he is, why he is here but can name his son's name (Michel) today. Following simple commands and can cross midline with some hesitation; continued mild aphasia, speaking more clearly overall.    Cranial nerves: Pupils equal and react symmetrically to light. R eye blind and opacified. Extraocular motion is full with no nystagmus. There is no ptosis. Mild right facial palsy , palate elevates symmetrically. Tongue is midline.     Motor: There is normal bulk and tone.  There is no tremor.  Strength is 5-/5 right deltoid, 5/5 right elbow, 5/5 right hand, 5/5 right leg    Strength is 4+/5 in the left arm and 5/5 in the left leg. Intermittent shaking of left leg, can stop on command .     Sensation: sensation greater on LUE>RUE, intact in the lower extremities b/l, no extinction     Cerebellar: no gross ataxia appreciated, is in proportion to weakness     Gait : deferred      LABS:     04-25 Chol 156 LDL -96- HDL 48 Trig 59    A1C: 5.6      ECHO:    ANTONIO W or WO Ultrasound Enhancing Agent (04.29.24 @ 13:05)    1. Left ventricular systolic function is normal with an ejection fraction visually estimated at 60 to 65 %.   2. Mild left ventricular hypertrophy.   3. Normal right ventricular cavity size and normal systolic function, inadequate estimation of RVSP.   4. The left atrium is moderately dilated.   5. No evidence of left atrial appendage thrombus. The left atrial appendage emptying velocity is normal.   6. Flailed posterior mitral leaflet (P3 scallop) with severe eccentric mitral regurgitation, jet is eccentric anteriorly directed, flailed gap of 0.46 cm.   7. Patent foramen ovale with left to right shunting by color flow Doppler.   8. Mild simple atheromas at the aortic arch.   9. Trace pericardial effusion.  10. Compared to the transthoracic echocardiogram performed on 4/26/2024, no evidence of vegetation or intracardiac abscess, mobile mitral valve structure due to flailed posterior mitral leaflet. .    TTE W or WO Ultrasound Enhancing Agent (04.26.24 @ 12:43)   CONCLUSIONS:    1. Left ventricular cavity is normal in size. Left ventricular systolic function is normal with an ejection fraction visually estimated at 55 to 60 %.   2. Severe asymmetric left ventricular hypertrophy.   3. Normal right ventricular cavity size and normal systolic function.   4. The left atrium is severely dilated.   5. The right atrium is normal in size.   6. Thickened mitral valve leaflets.   7. Mobile structure noted on the posterior mitral valve leaflet may represent ruptured chordae. Cannot rule out vegetation.   8. Severe mitral regurgitation.   9. Trileaflet aortic valve with normal systolic excursion.  10. Mild aortic regurgitation.  11. Mild pulmonic regurgitation.  12. No pericardial effusion seen.  13. Estimated pulmonary artery systolic pressure is 59 mmHg, consistent with moderate-to-severe pulmonary hypertension.    IMAGING: Reviewed by me.     Neuro-Imaging    MR Head w/wo IV Cont (04.30.24 @ 08:34)   Extensive parenchymal volume loss and chronic microvessel ischemic changes are identified  There is evidence of abnormal signal involving the left thalamus/corona   radiata/basal ganglia region. This corresponds acute parenchymal   hemorrhage seen on the prior head CT. Surrounding edema is identified No   definite areas of abnormal enhancement is seen though continued close   interval is recommended if underlying lesion is still suspected. This   area of hemorrhage measures approximately 3.1 x 1.9 cm  Numerous areas of abnormal susceptibility are seen throughout the   posterior fossa and supratentorial region. This finding could be   compatible with areas of old hemorrhage, possibly secondary to amyloid   angiopathy though the possibility of areas of mineralization or small   cavernous angiomas must be considered.  Evaluation of the diffusion weighted sequence demonstrates no abnormal   areas of restricted diffusion to suggest acute infarct.  The large vessels demonstrate normal flow voids  Opacification of the right sphenoid and right ethmoid sinus is identified   mucosal thickening. Similar finding is seen involving the right frontal   sinus. Air-fluid level seen involving the right maxillary sinus . Minimal   mucosal considered in the left maxillary sinus.  Mild inflammatory change involving right mastoid region.    IMPRESSION: Parenchymal hemorrhage again seen.  Numerous areas of abnormal susceptibility as described above.    CT Head No Cont (04.30.24 @ 01:23)   Impression: Stable acute parenchymal hemorrhage with slightly decreased   intraventricular hemorrhage.    CT Head No Cont (04.26.24 @ 06:16)   IMPRESSION:  Left thalamocapsular hemorrhage is stable    CT Head No Cont (04.25.24 @ 10:31)   1.  Grossly stable left thalamic hemorrhage.  2.  Midline shift to the right of approximately 0.4 cm.  3.  Chronic ischemic changes.  4.  Redemonstrated partially calcified masslike density in the right   nasal passages/ethmoid air cells, grossly stable.    (04.25.24 @ 04:26)   CTA NECK:  No significant stenosis of the cervical carotid arteries based   on NASCET criteria. Patent cervical vertebral arteries. No evidence of   cervical carotid or vertebral artery dissection.    Polypoid cystic and partially calcified mass in the right nasal passage   with protrudes into the knee nasopharynx and involves the right ethmoid   air cells. Near complete opacification of the right frontal sinus, right   ethmoid air cells, and right sphenoid sinus. Direct endoscopic   visualization is recommended. Correlation with outside prior imaging   would be helpful.    Mild pulmonary interstitial lung edema and small bilateral pleural   effusions.    CTA HEAD:  No high-grade stenosis or occlusion of the major proximal   arterial branches, however, short segment severe stenosis/occlusion of   the right posterior cerebral artery proximal P3 branch. No evidence of an   intracranial arterial aneurysm or arteriovenous malformation on CTA,   specifically, no evidence of vascular malformation in the region of the   left thalamic hemorrhage. No evidence of active extravasation of contrast   within the left thalamic parenchymal hemorrhage.    ULTRASOUND    US Duplex Venous Lower Ext Complete, Bilateral (04.28.24 @ 15:58)   No acute DVT of the lower extremities.

## 2024-05-03 NOTE — PROGRESS NOTE ADULT - NS ATTEND AMEND GEN_ALL_CORE FT
as above
Agree with PA's assessment and plan.
as above
Agree with PA's assessment and plan.  Hypertensive thalamic hemorrhage, however, a small underlying lession can not be r/o   - MRI brain pending
as above
NSGY Attg:    see above    patient seen and examined    agree with above    plan of care determined for ICH  MRI w and wo contrast pending  supportive care per ICU/stroke neurology
as above
Systol;ic murmur.  directed anteriorly. ICH.    ANTONIO today to assess anatomy of the mitral valve.  will ask CT surgery Dr kelsey to etablish care as outpaietnt surgery for the mtiral valve.    will follow up patient tomorrow.   ICH likely hypertensive.  BoP meds adjusted.

## 2024-05-03 NOTE — PROGRESS NOTE ADULT - SUBJECTIVE AND OBJECTIVE BOX
FUNCTIONAL PROGRESS  5/2 PT   Bed Mobility  Bed Mobility Training Sit-to-Supine: moderate assist (50% patient effort)  Bed Mobility Training Supine-to-Sit: moderate assist (50% patient effort)  Bed Mobility Training Limitations: cognitive, decreased safety awareness;  impaired balance;  impaired coordination;  impaired motor control    Sit-Stand Transfer Training  Transfer Training Sit-to-Stand Transfer: minimum assist (75% patient effort);  1 person assist;  nonverbal cues (demo/gestures);  verbal cues;  full weight-bearing   rolling walker  Transfer Training Stand-to-Sit Transfer: minimum assist (75% patient effort);  1 person assist;  nonverbal cues (demo/gestures);  verbal cues;  weight-bearing as tolerated   rolling walker  Sit-to-Stand Transfer Training Transfer Safety Analysis: impaired motor control;  impaired balance;  impaired coordination;  decreased strength    Gait Training  Gait Training: minimum assist (75% patient effort);  nonverbal cues (demo/gestures);  1 person assist;  verbal cues;  weight-bearing as tolerated   rolling walker;  10 feet  Gait Analysis: 3-point gait   decreased step length;  increased stride width;  impaired balance;  decreased strength;  impaired coordination;  impaired motor control;  impaired postural control;  10 feet;  rolling walker  Gait Number of Times:: x 1    VITALS  T(C): 36.5 (05-03-24 @ 04:34), Max: 36.8 (05-02-24 @ 16:45)  HR: 79 (05-03-24 @ 04:34) (74 - 84)  BP: 154/89 (05-03-24 @ 04:34) (150/84 - 160/89)  RR: 18 (05-03-24 @ 04:34) (17 - 18)  SpO2: 98% (05-03-24 @ 04:34) (96% - 98%)  Wt(kg): --    MEDICATIONS   amLODIPine   Tablet 10 milliGRAM(s) daily  dextrose 10% Bolus 125 milliLiter(s) once  dextrose 5%. 1000 milliLiter(s) <Continuous>  dextrose 5%. 1000 milliLiter(s) <Continuous>  dextrose 50% Injectable 25 Gram(s) once  dextrose 50% Injectable 12.5 Gram(s) once  dextrose Oral Gel 15 Gram(s) once PRN  enoxaparin Injectable 40 milliGRAM(s) <User Schedule>  glucagon  Injectable 1 milliGRAM(s) once  insulin lispro (ADMELOG) corrective regimen sliding scale   three times a day before meals  lisinopril 10 milliGRAM(s) daily  polyethylene glycol 3350 17 Gram(s) daily  senna 2 Tablet(s) at bedtime  sodium chloride 0.9%. 1000 milliLiter(s) <Continuous>      RECENT LABS/IMAGING  - Reviewed Today        CT Head 4/26 - Left thalamocapsular hemorrhage is stable      CT Head 4/25 - 1.  Grossly stable left thalamic hemorrhage. 2.  Midline shift to the right of approximately 0.4 cm. 3.  Chronic ischemic changes. 4.  Redemonstrated partially calcified masslike density in the right  nasal passages/ethmoid air cells, grossly stable.      CTA NECK & HEAD 4/25 -   No significant stenosis of the cervical carotid arteries based on NASCET criteria. Patent cervical vertebral arteries. No evidence of cervical carotid or vertebral artery dissection. Polypoid cystic and partially calcified mass in the right nasal passage with protrudes into the knee nasopharynx and involves the right ethmoid  air cells. Near complete opacification of the right frontal sinus, right ethmoid air cells, and right sphenoid sinus. Direct endoscopic visualization is recommended. Correlation with outside prior imaging  would be helpful. Mild pulmonary interstitial lung edema and small bilateral pleural effusions. CTA HEAD:  No high-grade stenosis or occlusion of the major proximal  arterial branches, however, short segment severe stenosis/occlusion of the right posterior cerebral artery proximal P3 branch. No evidence of an intracranial arterial aneurysm or arteriovenous malformation on CTA, specifically, no evidence of vascular malformation in the region of the left thalamic hemorrhage. No evidence of active extravasation of contrast within the left thalamic parenchymal hemorrhage.        TTE 4/26 -  1. Left ventricular cavity is normal in size. Left ventricular systolic function is normal with an ejection fraction visually estimated at 55 to 60 %.   2. Severe asymmetric left ventricular hypertrophy.   3. Normal right ventricular cavity size and normal systolic function.   4. The left atrium is severely dilated.   5. The right atrium is normal in size.   6. Thickened mitral valve leaflets.   7. Mobile structure noted on the posterior mitral valve leaflet may represent ruptured chordae. Cannot rule out vegetation.   8. Severe mitral regurgitation.   9. Trileaflet aortic valve with normal systolic excursion.  10. Mild aortic regurgitation.  11. Mild pulmonic regurgitation.  12. No pericardial effusion seen.  13. Estimated pulmonary artery systolic pressure is 59 mmHg, consistent with moderate-to-severe pulmonary hypertension.    HEAD CT 4/30 - Stable acute parenchymal hemorrhage with slightly decreased intraventricular hemorrhage.    MRI HEAD 4/30 - Parenchymal hemorrhage again seen. Numerous areas of abnormal susceptibility as described above.    EEG 4/30 - Abnormal EEG study - Focal intermittent left frontotemporal slowing. Mild generalized background slowing.  Periods of lower extremity shaking noted, without any correlating abnormality on EEG recording. Clinical Impression:  Focal cerebral dysfunction in left frontotemporal Mild diffuse/multi-focal cerebral dysfunction, not specific as to etiology.  There were no epileptiform abnormalities recorded.  Periods of lower extremity shaking, likely non epileptic, clinical correlation recommended.  ----------------------------------------------------------------------------------------  PHYSICAL EXAM  Constitutional - NAD, Comfortable  Extremities - No edema   Neurologic Exam -        Cognitive - AAOx self, being in hospital, PART situation      Communication - Expressive/Receptive deficits     Cranial Nerves - Right lip droop      FUNCTIONAL MOTOR EXAM - Noted right SH FLEXION and KE weakness with slowed movement, Right/Left discrimination impairments     Sensory - ?Left decrease to LT  Psychiatric - Restless  ----------------------------------------------------------------------------------------  ASSESSMENT/PLAN  58yMale with functional deficits after developing a intraparenchymal hemorrhage.   Left Thalamic IPH - Stable   HTN - Amlodipine, RECOMMEND Discontinue Hydralazine and Labetalol   Sleep - RECOMMEND Melatonin 5mg   Pain - RECOMMEND Tylenol 975mg    DVT PPX - SCDs, Lovenox  Rehab/Impaired mobility and function - Continuous hospitalization is crucial for managing the patient's acute medical issues (stroke), which have significantly impacted their mobility, quality of life, and function. Rehabilitation recommendations will be based on the patient's functional progress and their ability to participate in and tolerate therapeutic interventions, which may change over time. Maintaining ongoing mobilization by the staff is imperative to prevent secondary medical complications and associated health issues related to debility.    PENDING:  Placement     RECOMMENDATIONS:  -OOB to chair daily   -Delirium precautions  -Tylenol 975mg Q8H PRN for mild pain   -Melatonin 5mg QHS   -Discontinue IV BP medications     DISPOSITION:   The patient would benefit from ACUTE inpatient rehabilitation, but due to complex psychosocial issues will most likely be discharged HOME to the community. Social work and  following patient closely and helping with safe disposition.                    Interactive   Answering questions appropriately   No focal weakness   Coordination issues RUE, limiting functional progress    FUNCTIONAL PROGRESS  5/2 PT   Bed Mobility  Bed Mobility Training Sit-to-Supine: moderate assist (50% patient effort)  Bed Mobility Training Supine-to-Sit: moderate assist (50% patient effort)  Bed Mobility Training Limitations: cognitive, decreased safety awareness;  impaired balance;  impaired coordination;  impaired motor control    Sit-Stand Transfer Training  Transfer Training Sit-to-Stand Transfer: minimum assist (75% patient effort);  1 person assist;  nonverbal cues (demo/gestures);  verbal cues;  full weight-bearing   rolling walker  Transfer Training Stand-to-Sit Transfer: minimum assist (75% patient effort);  1 person assist;  nonverbal cues (demo/gestures);  verbal cues;  weight-bearing as tolerated   rolling walker  Sit-to-Stand Transfer Training Transfer Safety Analysis: impaired motor control;  impaired balance;  impaired coordination;  decreased strength    Gait Training  Gait Training: minimum assist (75% patient effort);  nonverbal cues (demo/gestures);  1 person assist;  verbal cues;  weight-bearing as tolerated   rolling walker;  10 feet  Gait Analysis: 3-point gait   decreased step length;  increased stride width;  impaired balance;  decreased strength;  impaired coordination;  impaired motor control;  impaired postural control;  10 feet;  rolling walker  Gait Number of Times:: x 1    VITALS  T(C): 36.5 (05-03-24 @ 04:34), Max: 36.8 (05-02-24 @ 16:45)  HR: 79 (05-03-24 @ 04:34) (74 - 84)  BP: 154/89 (05-03-24 @ 04:34) (150/84 - 160/89)  RR: 18 (05-03-24 @ 04:34) (17 - 18)  SpO2: 98% (05-03-24 @ 04:34) (96% - 98%)  Wt(kg): --    MEDICATIONS   amLODIPine   Tablet 10 milliGRAM(s) daily  dextrose 10% Bolus 125 milliLiter(s) once  dextrose 5%. 1000 milliLiter(s) <Continuous>  dextrose 5%. 1000 milliLiter(s) <Continuous>  dextrose 50% Injectable 25 Gram(s) once  dextrose 50% Injectable 12.5 Gram(s) once  dextrose Oral Gel 15 Gram(s) once PRN  enoxaparin Injectable 40 milliGRAM(s) <User Schedule>  glucagon  Injectable 1 milliGRAM(s) once  insulin lispro (ADMELOG) corrective regimen sliding scale   three times a day before meals  lisinopril 10 milliGRAM(s) daily  polyethylene glycol 3350 17 Gram(s) daily  senna 2 Tablet(s) at bedtime  sodium chloride 0.9%. 1000 milliLiter(s) <Continuous>      RECENT LABS/IMAGING  - Reviewed Today        CT Head 4/26 - Left thalamocapsular hemorrhage is stable      CT Head 4/25 - 1.  Grossly stable left thalamic hemorrhage. 2.  Midline shift to the right of approximately 0.4 cm. 3.  Chronic ischemic changes. 4.  Redemonstrated partially calcified masslike density in the right  nasal passages/ethmoid air cells, grossly stable.      CTA NECK & HEAD 4/25 -   No significant stenosis of the cervical carotid arteries based on NASCET criteria. Patent cervical vertebral arteries. No evidence of cervical carotid or vertebral artery dissection. Polypoid cystic and partially calcified mass in the right nasal passage with protrudes into the knee nasopharynx and involves the right ethmoid  air cells. Near complete opacification of the right frontal sinus, right ethmoid air cells, and right sphenoid sinus. Direct endoscopic visualization is recommended. Correlation with outside prior imaging  would be helpful. Mild pulmonary interstitial lung edema and small bilateral pleural effusions. CTA HEAD:  No high-grade stenosis or occlusion of the major proximal  arterial branches, however, short segment severe stenosis/occlusion of the right posterior cerebral artery proximal P3 branch. No evidence of an intracranial arterial aneurysm or arteriovenous malformation on CTA, specifically, no evidence of vascular malformation in the region of the left thalamic hemorrhage. No evidence of active extravasation of contrast within the left thalamic parenchymal hemorrhage.        TTE 4/26 -  1. Left ventricular cavity is normal in size. Left ventricular systolic function is normal with an ejection fraction visually estimated at 55 to 60 %.   2. Severe asymmetric left ventricular hypertrophy.   3. Normal right ventricular cavity size and normal systolic function.   4. The left atrium is severely dilated.   5. The right atrium is normal in size.   6. Thickened mitral valve leaflets.   7. Mobile structure noted on the posterior mitral valve leaflet may represent ruptured chordae. Cannot rule out vegetation.   8. Severe mitral regurgitation.   9. Trileaflet aortic valve with normal systolic excursion.  10. Mild aortic regurgitation.  11. Mild pulmonic regurgitation.  12. No pericardial effusion seen.  13. Estimated pulmonary artery systolic pressure is 59 mmHg, consistent with moderate-to-severe pulmonary hypertension.    HEAD CT 4/30 - Stable acute parenchymal hemorrhage with slightly decreased intraventricular hemorrhage.    MRI HEAD 4/30 - Parenchymal hemorrhage again seen. Numerous areas of abnormal susceptibility as described above.    EEG 4/30 - Abnormal EEG study - Focal intermittent left frontotemporal slowing. Mild generalized background slowing.  Periods of lower extremity shaking noted, without any correlating abnormality on EEG recording. Clinical Impression:  Focal cerebral dysfunction in left frontotemporal Mild diffuse/multi-focal cerebral dysfunction, not specific as to etiology.  There were no epileptiform abnormalities recorded.  Periods of lower extremity shaking, likely non epileptic, clinical correlation recommended.  ----------------------------------------------------------------------------------------  PHYSICAL EXAM  Constitutional - NAD, Comfortable  Extremities - No edema   Neurologic Exam -        Cognitive - AAOx self, being in hospital, PART situation      Communication - Expressive/Receptive deficits     Cranial Nerves - Right lip droop      FUNCTIONAL MOTOR EXAM - 5/5 strength throughout     Coordination - (-) FTN Right     Sensory - Intact   Psychiatric - Calm  ----------------------------------------------------------------------------------------  ASSESSMENT/PLAN  58yMale with functional deficits after developing a intraparenchymal hemorrhage.   Left Thalamic IPH - Stable   HTN - Amlodipine, lisinopril   Sleep - RECOMMEND Melatonin 5mg   Pain - RECOMMEND Tylenol  Diet - Regular  DVT PPX - SCDs, Lovenox  Rehab/Impaired mobility and function - Continuous hospitalization is crucial for managing the patient's acute medical issues (stroke), which have significantly impacted their mobility, quality of life, and function. Rehabilitation recommendations will be based on the patient's functional progress and their ability to participate in and tolerate therapeutic interventions, which may change over time. Maintaining ongoing mobilization by the staff is imperative to prevent secondary medical complications and associated health issues related to debility.    PENDING:  Placement     RECOMMENDATIONS:  -OOB to chair daily   -PT/OT to continue to focus on coordination   -Delirium precautions  -Tylenol PRN for pain control   -Melatonin 5mg QHS     DISPOSITION:   The patient would benefit from ACUTE inpatient rehabilitation, but due to complex psychosocial issues will most likely be discharged HOME to the community. Social work and  following patient closely and helping with safe disposition. PM&R will sign off at this time. Thank you for the interesting consult. Please re-consult if any new functional or neurological deficits arise.

## 2024-05-03 NOTE — PROGRESS NOTE ADULT - ATTENDING COMMENTS
Patient seen and examined, discussed patient with Dr. Lacey and agree with recommendations.    Rehab/Impaired mobility and function - Patient continues to require hospitalization for the above diagnoses and ongoing active management of comorbid complications (IV meds) that are substantially impairing functional ability and impairing quality of life.     RECOMMEND - OOB daily      Patient with complex psychosocial factors and limited resources for rehab. Therefore, CM and therapy services will need to work on support in community for disposition of HOME.     Will continue to follow. Recommend ongoing mobilization by staff to maintain cardiopulmonary function and prevention of secondary complications related to debility. Discussed the specific management and recommendations above with rehab clinical care team/rehab liaison.
Patient seen and examined, discussed patient with Dr. Lacey and agree with recommendations.    Rehab/Impaired mobility and function - Patient continues to require hospitalization for the above diagnoses and ongoing active management of comorbid complications  that are substantially impairing functional ability and impairing quality of life.     RECOMMEND - OOB daily      Patient with complex psychosocial factors and limited resources for rehab. Therefore, CM and therapy services will need to work on support in community for disposition of HOME.     Will sign off at this time. Thank you for allowing me to be part of your patient's care. Please reconsult PMR for additional rehab recommendations or dispo needs if functional status changes. Discussed the specific management and recommendations above with rehab clinical care team/rehab liaison.      Total Time Spent on Encounter (reviewing clinical notes, labs, radiology, medications, patient history/exam, assessment and plan) - 50 minutes

## 2024-05-03 NOTE — PROGRESS NOTE ADULT - NS ATTEND OPT1 GEN_ALL_CORE
I independently performed the documented:
I attest my time as attending is greater than 50% of the total combined time spent on qualifying patient care activities by the PA/NP and attending.
I independently performed the documented:
I attest my time as attending is greater than 50% of the total combined time spent on qualifying patient care activities by the PA/NP and attending.
I attest my time as attending is greater than 50% of the total combined time spent on qualifying patient care activities by the PA/NP and attending.
I independently performed the documented:
I attest my time as attending is greater than 50% of the total combined time spent on qualifying patient care activities by the PA/NP and attending.
I independently performed the documented:

## 2024-05-03 NOTE — PROGRESS NOTE ADULT - NS ATTEND BILL GEN_ALL_CORE
Attending to bill
PA/NP to bill
Attending to bill

## 2024-05-03 NOTE — CHART NOTE - NSCHARTNOTEFT_GEN_A_CORE
Source: Staff, chart     Current Diet: Diet, Easy to Chew:   DASH/TLC {Sodium & Cholesterol Restricted} (DASH) (04-29-24 @ 15:53) [Active]    PO intake:  %    Source for PO intake: staff, chart    Current Weight:   4/25 201.5 lbs  4/26 203.7 lbs  5/3 203.4 lbs    Pertinent Medications: MEDICATIONS  (STANDING):  amLODIPine   Tablet 10 milliGRAM(s) Oral daily  dextrose 10% Bolus 125 milliLiter(s) IV Bolus once  dextrose 5%. 1000 milliLiter(s) (50 mL/Hr) IV Continuous <Continuous>  dextrose 50% Injectable 25 Gram(s) IV Push once  enoxaparin Injectable 40 milliGRAM(s) SubCutaneous <User Schedule>  glucagon  Injectable 1 milliGRAM(s) IntraMuscular once  insulin lispro (ADMELOG) corrective regimen sliding scale   SubCutaneous three times a day before meals  lisinopril 10 milliGRAM(s) Oral daily  polyethylene glycol 3350 17 Gram(s) Oral daily  senna 2 Tablet(s) Oral at bedtime  sodium chloride 0.9%. 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 ED BIBA on 4/25/24 for Right sided facial droop and Right sided weakness. Patient LKW was about 0200 when he was headed into work that morning. 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 EMS was then called. Patient came in as code stroke. Per son patient takes asa daily for his heart and is unsure of any other medications or medical history. Son reports patient had been complaining of HA for about 2-3 weeks. In the ED /137. CTH showed LEFT thalamic IPH. NIHSS at time of examination was 17. Patient started on Cardene drip. MRs 0. ICH score 2.     Current Nutrition Diagnosis: Swallowing Difficulty related to LEFT thalamic IPH as evidenced by need for Dysphagia diet    Patient continuing to tolerate diet well with good appetite/PO intake. Breakfast tray observed with 100% consumed. Per SLP on 5/1 - recommended for pt to be advanced to regular solids with thin liquids. Message left with physician for the same. Per nursing staff pt with no c/o N/V/C/D. Will continue to monitor and follow up as needed. RD remains available.     Recommendations:   1) Change diet to regular with thin liquids  2) Encourage po intake, monitor diet tolerance, and provide assistance at meals as needed.   3) Rx: MVI daily.   4) Obtain daily weights to monitor trends.     Monitoring and Evaluation:   [x] PO intake [x] Tolerance to diet prescription [X] Weights  [X] Follow up per protocol [X] Labs: chem 8, mg, phos, H/H

## 2024-05-03 NOTE — PROGRESS NOTE ADULT - ASSESSMENT
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 Upon presentation to [facility] ED initial BP was noted to be 198/137. CT head revealed LEFT thalamic IPH. CTA head and neck was negative except for stenosis of the right P3 segment. MRI demonstrated chronic small vessel disease and again noted left thalamic/basal ganglia/corona radiata acute parenchymal hemorrhage with cerebral edema, mass effect, and brain compression. There was no noted areas of abnormal enhancement. Additional regions of susceptibility seen throughout, consistent with regions of prior hemorrhage. The patient was started on a Cardene drip and sent to the Neuro ICU; the stroke team was called and the pt was then transferred to the service on 4/26/24 for further workup and management.     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   Patient is improving, more alert and getting stronger.     NEURO:   - Neurologically without acute change, overall continued improvement in right hemiparesis. Fluctuations in orientation possibly related to hospital delirium.    - LLE shaking secondary to anxiety and nervousness reported by the pt - 4/30/24 spot EEG negative for seizures and ammonia WNL.   - Repeat CT head 4/30/24 with no acute change and slightly improved IVH  - Continue close monitoring for neurologic deterioration    - Stroke neuro checks q 4 hr    - Keep SBP goal 120-140mmHg for now ; pt w/ SBP readings of 130-150mmHg today ; pt now on Norvasc 10mg daily, continue to monitor avoiding rapid fluctuations and hypotension  - ANTITHROMBOTIC THERAPY: no acute neurological indication at this time.  If cardiac indication present will need to have ongoing evaluation for timeline as at this time risk of hemorrhage is increased.   - titrate statin based on outpatient ASCVD risk stratification, no cerebral ischemia at this time.  LDL: 96  - MRI Brain w/w/o as noted above , suggest repeat in 4-6 weeks upon resolution of hemorrhage.   - Dysphagia screen: pass, advance diet as tolerated per protocol   - Physical therapy/OT/Speech eval/treatment.     CARDIOVASCULAR: Severe MR  - TTE : EF 55-60% with severely dilated left atrium, LVH, severely dilated LA, mobile mitral valve posterior leaflet possibly ruptured chordae but can not exclude vegetation, cardiac monitoring w/ telemetry for now, further evaluation pending findings of noted workup, consideration in elective cardiac monitoring   - ANTONIO : Flailed posterior mitral leaflet (P3 scallop) with severe eccentric mitral regurgitation, jet is eccentric anteriorly directed, flailed gap of 0.46 cm, Patent foramen ovale with left to right shunting by color flow Doppler. Ejection fraction visually estimated at 60 to 65 %.  - Cardiology follow up for long term plan re: MV   - No acute neurological indication for PFO closure, non ischemic stroke and underlying hemorrhage/possible cerebral amyloid angiopathy.                             HEMATOLOGY:  - H/H without acute change, Platelets 203, patient should have all age and risk appropriate malignancy screenings with PCP or sooner if clinically suspected   -DVT ppx: Heparin s.c [] LMWH [x]     PULMONARY:   - On room air, protecting airway, saturating well     RENAL:   -BUN/Cr within range, monitor urine output, maintain adequate hydration    -Na Goal:  135-145  -Gallegos: N     ID:   - afebrile, no leukocytosis, monitor for si/sx of infection , blood cx x 2 in setting of r/o vegetation NGTD,  - 4/30/24 UA negative nitrite/LE/bacteria.     OTHER:    - condition and plan of care d/w patient, questions and concerns addressed.   - TSH slightly elevated, suggest close follow up serial monitoring out of acute phase.  T3/4 within limits.     DISPOSITION:   Home once able. Family training.  Will d/w son.  Cannot go to Acute Rehab.  D/w Family--they will take him home on Saturday.       CORE MEASURES:        Admission NIHSS: 17     Tenecteplase : [] YES [x] NO      LDL/HDL/A1C: 96/48/5.8     Depression Screen - if depression hx and/or present      Statin Therapy: as noted      Dysphagia Screen: [x] PASS [] FAIL     Smoking [] YES [x] NO      Afib [] YES [x] NO     Stroke Education [x] YES [] NO 4/25/24    Obtain screening lower extremity venous ultrasound in patients who meet 1 or more of the following criteria as patient is high risk for DVT/PE on admission:   [] History of DVT/PE  []Hypercoagulable states (Factor V Leiden, Cancer, OCP, etc. 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 on 4/25/2024 with right-sided weakness, right facial paralysis after sudden onset of sx at work. Upon presentation to [facility] ED initial BP was noted to be 198/137. CT head revealed LEFT thalamic IPH. CTA head and neck was negative except for stenosis of the right P3 segment. MRI demonstrated chronic small vessel disease and again noted left thalamic/basal ganglia/corona radiata acute parenchymal hemorrhage with cerebral edema, mass effect, and brain compression. There was no noted areas of abnormal enhancement. Additional regions of susceptibility seen throughout, consistent with regions of prior hemorrhage. The patient was started on a Cardene drip and sent to the Neuro ICU; the stroke team was called and the pt was then transferred to the service on 4/26/24 for further workup and management.     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 Patient is improving, more alert and getting stronger.     NEURO:   - Neurologically without acute change, overall continued improvement in right hemiparesis. Fluctuations in orientation possibly related to hospital delirium.    - LLE shaking secondary to anxiety and nervousness reported by the pt - 4/30/24 spot EEG negative for seizures and ammonia WNL.   - Repeat CT head 4/30/24 with no acute change and slightly improved IVH  - Continue close monitoring for neurologic deterioration    - Stroke neuro checks q 4 hr    - SBP goal 120-140mmHg, was 150-160mmHg this morning but currently trending down to 110-120mmHg; is on Norvasc 10mg PO QD, added Lisinopil 10mg PO QD today. Will continue to monitor, avoiding rapid fluctuations and hypotension.  - ANTITHROMBOTIC THERAPY: no acute neurological indication at this time.  If cardiac indication present will need to have ongoing evaluation for timeline as at this time risk of hemorrhage is increased.   - titrate statin based on outpatient ASCVD risk stratification, no cerebral ischemia at this time.  LDL: 96  - MRI Brain w/w/o as noted above , suggest repeat in 4-6 weeks upon resolution of hemorrhage.   - Dysphagia screen: pass, advance diet as tolerated per protocol   - Physical therapy/OT/Speech eval/treatment.     CARDIOVASCULAR: Severe MR  - TTE : EF 55-60% with severely dilated left atrium, LVH, severely dilated LA, mobile mitral valve posterior leaflet possibly ruptured chordae but can not exclude vegetation, cardiac monitoring w/ telemetry for now, further evaluation pending findings of noted workup, consideration in elective cardiac monitoring   - ANTONIO : Flailed posterior mitral leaflet (P3 scallop) with severe eccentric mitral regurgitation, jet is eccentric anteriorly directed, flailed gap of 0.46 cm, Patent foramen ovale with left to right shunting by color flow Doppler. Ejection fraction visually estimated at 60 to 65 %.  - Cardiology follow up for long term plan regarding MV   - No acute neurological indication for PFO closure, non ischemic stroke and underlying hemorrhage/possible cerebral amyloid angiopathy.                             HEMATOLOGY:  - H/H without acute change, Platelets 203, patient should have all age and risk appropriate malignancy screenings with PCP or sooner if clinically suspected   - DVT ppx: Heparin s.c [] LMWH [x]     PULMONARY:   - On room air, protecting airway, saturating well     RENAL:   -BUN/Cr within range, monitor urine output, maintain adequate hydration    -Na Goal:  135-145  -Gallegos: N     ID:   - afebrile, no leukocytosis, monitor for si/sx of infection , blood cx x 2 in setting of r/o vegetation NGTD,  - 4/30/24 UA negative nitrite/LE/bacteria.     OTHER:    - condition and plan of care d/w patient, questions and concerns addressed.   - TSH slightly elevated, suggest close follow up serial monitoring out of acute phase.  T3/4 within limits.     DISPOSITION:   Home once able. Cannot go to Acute Rehab.  D/w Family--they will take him home on Saturday.  Family training with PT and son planned for 2pm today.        CORE MEASURES:        Admission NIHSS: 17     Tenecteplase : [] YES [x] NO      LDL/HDL/A1C: 96/48/5.6     Depression Screen - if depression hx and/or present      Statin Therapy: as noted      Dysphagia Screen: [x] PASS [] FAIL     Smoking [] YES [x] NO      Afib [] YES [x] NO     Stroke Education [x] YES [] NO 4/25/24    Obtain screening lower extremity venous ultrasound in patients who meet 1 or more of the following criteria as patient is high risk for DVT/PE on admission:   [] History of DVT/PE  []Hypercoagulable states (Factor V Leiden, Cancer, OCP, etc. )  []Prolonged immobility (hemiplegia/hemiparesis/post operative or any other extended immobilization)  [] Transferred from outside facility (Rehab or Long term care)  [] Age </= to 50

## 2024-05-04 ENCOUNTER — TRANSCRIPTION ENCOUNTER (OUTPATIENT)
Age: 59
End: 2024-05-04

## 2024-05-04 VITALS
TEMPERATURE: 98 F | DIASTOLIC BLOOD PRESSURE: 71 MMHG | HEART RATE: 80 BPM | SYSTOLIC BLOOD PRESSURE: 114 MMHG | OXYGEN SATURATION: 98 % | RESPIRATION RATE: 18 BRPM

## 2024-05-04 LAB
GLUCOSE BLDC GLUCOMTR-MCNC: 119 MG/DL — HIGH (ref 70–99)
GLUCOSE BLDC GLUCOMTR-MCNC: 140 MG/DL — HIGH (ref 70–99)

## 2024-05-04 PROCEDURE — 99239 HOSP IP/OBS DSCHRG MGMT >30: CPT

## 2024-05-04 RX ORDER — ASPIRIN/CALCIUM CARB/MAGNESIUM 324 MG
1 TABLET ORAL
Refills: 0 | DISCHARGE

## 2024-05-04 RX ADMIN — LISINOPRIL 10 MILLIGRAM(S): 2.5 TABLET ORAL at 05:11

## 2024-05-04 RX ADMIN — POLYETHYLENE GLYCOL 3350 17 GRAM(S): 17 POWDER, FOR SOLUTION ORAL at 09:27

## 2024-05-04 RX ADMIN — AMLODIPINE BESYLATE 10 MILLIGRAM(S): 2.5 TABLET ORAL at 05:11

## 2024-05-04 NOTE — DISCHARGE NOTE PROVIDER - REASON FOR ADMISSION
[facility]    Patient Identification:  Name: Victor Manuel Issa  Age: 77 y.o.  Sex: male  :  1941  MRN: [id number]         Primary Care Physician: Shira Pierce APRN    Chief Complaint   Patient presents with   • Chest Pain     Subjective   Patient is a 77 y.o. male came in because of chest pain. he describes chest pressure in the middle of his chest with radiation and numbness in his arms and it lasted at least an hour when he was in the ED. he was given nitroglycerin once and he says it helped the pain. a couple days ago he had dark red spit up after some nausea and vomiting. he has intermittent spitting up blood since then. he denies any coughing up blood. he had partial hip surgery in May and was in rehab for some time in [place] and afterwards fell and has pain in his right hip since. He is less active now due to the pain. He also has had penis biopsy and was to follow up with his urologist tomorrow for the result.    Past Medical History:   Diagnosis Date   • Arthritis    • Bipolar 1 disorder (CMS/HCC)    • COPD (chronic obstructive pulmonary disease) (CMS/HCC)    • Coronary artery disease    • Disease of thyroid gland    • Hyperlipidemia    • Myocardial infarction (CMS/HCC)    • Stroke (CMS/HCC)      Past Surgical History:   Procedure Laterality Date   • ABDOMINAL SURGERY     • CARDIAC SURGERY     • CHOLECYSTECTOMY     • CORONARY ARTERY BYPASS GRAFT      x2   • EYE SURGERY      cataracts   • HIP ENDOPROSTHESIS Right 2018    Procedure: RIGHT HIP HEMIARTHROPLASTY;  Surgeon: Winston Vallejo MD;  Location: [facility] OR;  Service: Orthopedics   • MUSCLE BIOPSY Left 3/24/2016    Procedure: LT THIGH MUSCLE BIOPSY;  Surgeon: Fausto Mack MD;  Location: [facility] OR;  Service:    • SKIN BIOPSY     • THYROID SURGERY     • TURP / TRANSURETHRAL INCISION / DRAINAGE PROSTATE     • VASCULAR SURGERY       Current medications  reviewed.    Family History   Problem Relation Age of Onset   • Cancer Mother    • Arthritis Father    • Heart disease Father    • Early death Brother    • Heart disease Brother      Social History   Substance Use Topics   • Smoking status: Current Every Day Smoker     Packs/day: 0.50     Types: Cigarettes     Start date: 1960   • Smokeless tobacco: Never Used      Comment: Educated pt on quitting   • Alcohol use Yes      Comment: occasionally     Review of Systems   Constitutional: Positive for activity change. Negative for chills and fever.   HENT: Negative.    Eyes: Negative.    Respiratory: Positive for chest tightness. Negative for shortness of breath.    Cardiovascular: Positive for chest pain.   Gastrointestinal: Positive for nausea and vomiting. Negative for diarrhea.   Endocrine: Negative.    Genitourinary: Negative.    Musculoskeletal: Positive for arthralgias (right hip pain since fall in July or August).   Skin: Negative.    Allergic/Immunologic: Negative.    Neurological: Positive for weakness (generalized).   Hematological: Negative.    Psychiatric/Behavioral: Negative.       Objective      Vital Signs  Temp:  [98.1 °F (36.7 °C)-98.4 °F (36.9 °C)] 98.1 °F (36.7 °C)  Heart Rate:  [] 82  Resp:  [16-17] 17  BP: ()/(69-72) 122/71  Body mass index is 22.45 kg/m².    Physical Exam   Constitutional: He is oriented to person, place, and time. He appears well-developed and well-nourished.  Non-toxic appearance. He does not have a sickly appearance. He appears ill (chronic). No distress.   HENT:   Head: Normocephalic and atraumatic.   Eyes: Conjunctivae and EOM are normal.   Neck: Neck supple. No tracheal deviation present.   Cardiovascular: Normal rate, regular rhythm and intact distal pulses.    Pulses:       Radial pulses are 2+ on the right side, and 2+ on the left side.        Dorsalis pedis pulses are 2+ on the right side, and 2+ on the left side.   Pulmonary/Chest: Effort normal and breath  Left Thalamic IPH sounds normal. No respiratory distress. He has no wheezes. He has no rales.   Abdominal: Soft. He exhibits no distension. There is no tenderness. There is no rebound and no guarding.   Musculoskeletal: He exhibits no edema.   Lymphadenopathy:     He has no cervical adenopathy.   Neurological: He is alert and oriented to person, place, and time.   Skin: Skin is warm and dry.   Psychiatric: He has a normal mood and affect. His behavior is normal.     Results Review:  I reviewed the patient's new clinical results.  I reviewed the patient's new imaging results and agree with the interpretation.  I reviewed the patient's other test results and agree with the interpretation  I personally viewed and interpreted the patient's EKG/Telemetry data    Lab Results   Component Value Date    ANIONGAP 11.7 10/02/2018     Estimated Creatinine Clearance: 64 mL/min (by C-G formula based on SCr of 1.03 mg/dL).    Assessment/Plan   Active Hospital Problems    Diagnosis Date Noted   • **Chest pain [R07.9] 10/02/2018   • Hip pain, chronic, right [M25.551, G89.29] 10/02/2018   • Hematemesis [K92.0] 10/02/2018   • Hypothyroidism [E03.9] 10/01/2017   • Parkinson's disease [G20] 09/29/2017   • COPD (chronic obstructive pulmonary disease) [J44.9] 09/28/2017   • History of CVA (cerebrovascular accident) [Z86.73] 09/28/2017   • Coronary artery disease [I25.10] 09/28/2017      Resolved Hospital Problems    Diagnosis Date Noted Date Resolved   No resolved problems to display.     77 y.o. male   · chest pain  · history of CABG  · no PE on CTA chest  · cardiology consult  · repeat troponins, EKGs  · dimer elevated not specific. CTA chest negative. check legs because of relative immobility  · suspect pain is GI  · hematemesis and dilated esophagus on CTA chest   · serial Hgb and transfuse as needed  · hold ASA  · PPI  · patient also has elevated BUN:Cr previously BUN was 17 (now 33)  · GI consult  · check gastroccult, hemoccult  · SCDs for now  · he  was also coughing when I saw him as he was eating dinner: SLP eval  · hypercalcemia  · check ionized  · may be from relative immobility  · hold vit D  · check TSH  · leukocytosis  · reactive  · no fever, no PNA on CTA chest  · continue to monitor  · right hip pain  · consult his orthopedic surgeon  · check XRs  · PT eval  · COPD  · continue meds  · had recent biopsy penis  · was to follow up with Dr. Chilel tomorrow for result  · wife is health care surrogate. full code  · I discussed the patient's findings and my recommendations with patient and family and Dr. Horne.    Parker Romero MD  10/02/18  7:30 PM

## 2024-05-04 NOTE — PROGRESS NOTE ADULT - ASSESSMENT
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 Upon presentation to [facility] ED initial BP was noted to be 198/137. CT head revealed LEFT thalamic IPH. CTA head and neck was negative except for stenosis of the right P3 segment. MRI demonstrated chronic small vessel disease and again noted left thalamic/basal ganglia/corona radiata acute parenchymal hemorrhage with cerebral edema, mass effect, and brain compression. There was no noted areas of abnormal enhancement. Additional regions of susceptibility seen throughout, consistent with regions of prior hemorrhage. The patient was started on a Cardene drip and sent to the Neuro ICU; the stroke team was called and the pt was then transferred to the service on 4/26/24 for further workup and management.     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 Patient is improving, more alert and getting stronger.     NEURO:   - Neurologically without acute change, overall continued improvement in right hemiparesis. Fluctuations in orientation possibly related to hospital delirium.    - LLE shaking secondary to anxiety and nervousness reported by the pt - 4/30/24 spot EEG negative for seizures and ammonia WNL.   - Repeat CT head 4/30/24 with no acute change and slightly improved IVH  - Continue close monitoring for neurologic deterioration    - Stroke neuro checks q 4 hr    - SBP goal 120-140mmHg. On Norvasc 10mg PO QD with addition of  Lisinopil 10mg PO QD on 5/3. Appears to be tolerating well. Will continue to monitor, avoiding rapid fluctuations and hypotension.  - ANTITHROMBOTIC THERAPY: no acute neurological indication at this time.  If cardiac indication present will need to have ongoing evaluation for timeline as at this time risk of hemorrhage is increased.  - titrate statin based on outpatient ASCVD risk stratification, no cerebral ischemia at this time.  LDL: 96  - MRI Brain w/w/o as noted above , suggest repeat in 4-6 weeks upon resolution of hemorrhage.   - Dysphagia screen: pass, advance diet as tolerated per protocol   - Physical therapy/OT/Speech eval/treatment.     CARDIOVASCULAR: Severe MR  - TTE : EF 55-60% with severely dilated left atrium, LVH, severely dilated LA, mobile mitral valve posterior leaflet possibly ruptured chordae but can not exclude vegetation, cardiac monitoring w/ telemetry for now, further evaluation pending findings of noted workup, consideration in elective cardiac monitoring   - ANTONIO : Flailed posterior mitral leaflet (P3 scallop) with severe eccentric mitral regurgitation, jet is eccentric anteriorly directed, flailed gap of 0.46 cm, Patent foramen ovale with left to right shunting by color flow Doppler. Ejection fraction visually estimated at 60 to 65 %.  - Cardiology follow up for long term plan regarding MV   - No acute neurological indication for PFO closure, non ischemic stroke and underlying hemorrhage/possible cerebral amyloid angiopathy.                             HEMATOLOGY:  - H/H without acute change, Platelets 203, patient should have all age and risk appropriate malignancy screenings with PCP or sooner if clinically suspected   - DVT ppx: Heparin s.c [] LMWH [x]     PULMONARY:   - On room air, protecting airway, saturating well     RENAL:   -BUN/Cr within range, monitor urine output, maintain adequate hydration    -Na Goal:  135-145  -Gallegos: N     ID:   - afebrile, no leukocytosis, monitor for si/sx of infection , blood cx x 2 in setting of r/o vegetation NGTD,  - 4/30/24 UA negative nitrite/LE/bacteria.     OTHER:    - condition and plan of care d/w patient, questions and concerns addressed.   - TSH slightly elevated, suggest close follow up serial monitoring out of acute phase.  T3/4 within limits.     DISPOSITION:   Home once able. Cannot go to Acute Rehab.  D/w Family--they will take him home on Saturday.  Family training with PT and son planned for 2pm on 5/3/24. Plan for discharge on 5/4      CORE MEASURES:        Admission NIHSS: 17     Tenecteplase : [] YES [x] NO      LDL/HDL/A1C: 96/48/5.6     Depression Screen - if depression hx and/or present      Statin Therapy: as noted      Dysphagia Screen: [x] PASS [] FAIL     Smoking [] YES [x] NO      Afib [] YES [x] NO     Stroke Education [x] YES [] NO 4/25/24    Obtain screening lower extremity venous ultrasound in patients who meet 1 or more of the following criteria as patient is high risk for DVT/PE on admission:   [] History of DVT/PE  []Hypercoagulable states (Factor V Leiden, Cancer, OCP, etc. 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 Upon presentation to [facility] ED initial BP was noted to be 198/137. CT head revealed LEFT thalamic IPH. CTA head and neck was negative except for stenosis of the right P3 segment. MRI demonstrated chronic small vessel disease and again noted left thalamic/basal ganglia/corona radiata acute parenchymal hemorrhage with cerebral edema, mass effect, and brain compression. There was no noted areas of abnormal enhancement. Additional regions of susceptibility seen throughout, consistent with regions of prior hemorrhage. The patient was started on a Cardene drip and sent to the Neuro ICU; the stroke team was called and the pt was then transferred to the service on 4/26/24 for further workup and management.     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 Patient is improving, more alert and getting stronger.     NEURO:   - Neurologically without acute change, overall continued improvement in right hemiparesis. Fluctuations in orientation possibly related to hospital delirium.    - LLE shaking secondary to anxiety and nervousness reported by the pt - 4/30/24 spot EEG negative for seizures and ammonia WNL.   - Repeat CT head 4/30/24 with no acute change and slightly improved IVH  - Continue close monitoring for neurologic deterioration    - Stroke neuro checks q 4 hr    - SBP goal 120-140mmHg. On Norvasc 10mg PO QD with addition of  Lisinopil 10mg PO QD on 5/3. Appears to be tolerating well. Will continue to monitor, avoiding rapid fluctuations and hypotension.  - ANTITHROMBOTIC THERAPY: no acute neurological indication at this time.  If cardiac indication present will need to have ongoing evaluation for timeline as at this time risk of hemorrhage is increased.  - titrate statin based on outpatient ASCVD risk stratification, no cerebral ischemia at this time.  LDL: 96  - MRI Brain w/w/o as noted above , suggest repeat in 4-6 weeks upon resolution of hemorrhage.   - Dysphagia screen: pass, advance diet as tolerated per protocol   - Physical therapy/OT/Speech eval/treatment.     CARDIOVASCULAR: Severe MR  - TTE : EF 55-60% with severely dilated left atrium, LVH, severely dilated LA, mobile mitral valve posterior leaflet possibly ruptured chordae but can not exclude vegetation, cardiac monitoring w/ telemetry for now,, consideration in elective cardiac monitoring   - ANTONIO : Flailed posterior mitral leaflet (P3 scallop) with severe eccentric mitral regurgitation, jet is eccentric anteriorly directed, flailed gap of 0.46 cm, Patent foramen ovale with left to right shunting by color flow Doppler. Ejection fraction visually estimated at 60 to 65 %.  - Cardiology follow up for long term plan regarding MV   - No acute neurological indication for PFO closure, non ischemic stroke and underlying hemorrhage/possible cerebral amyloid angiopathy.                             HEMATOLOGY:  - H/H without acute change, Platelets 203, patient should have all age and risk appropriate malignancy screenings with PCP or sooner if clinically suspected   - DVT ppx: Heparin s.c [] LMWH [x]     PULMONARY:   - On room air, protecting airway, saturating well     RENAL:   -BUN/Cr within range, monitor urine output, maintain adequate hydration    -Na Goal:  135-145  -Gallegos: N    ID:   - afebrile, no leukocytosis, monitor for si/sx of infection , blood cx x 2 in setting of r/o vegetation NGTD,  - 4/30/24 UA negative nitrite/LE/bacteria.     OTHER:    - condition and plan of care d/w patient, questions and concerns addressed.   - TSH slightly elevated, suggest close follow up serial monitoring out of acute phase.  T3/4 within limits.     DISPOSITION:   Home once able. Cannot go to Acute Rehab.  D/w Family--they will take him home on Saturday.  Family training with PT and son planned for 2pm on 5/3/24. Plan for discharge on 5/4. Patient's prescriptions sent to [facility] at [facility] in [place]       CORE MEASURES:        Admission NIHSS: 17     Tenecteplase : [] YES [x] NO      LDL/HDL/A1C: 96/48/5.6     Depression Screen - if depression hx and/or present      Statin Therapy: as noted      Dysphagia Screen: [x] PASS [] FAIL     Smoking [] YES [x] NO      Afib [] YES [x] NO     Stroke Education [x] YES [] NO 4/25/24    Obtain screening lower extremity venous ultrasound in patients who meet 1 or more of the following criteria as patient is high risk for DVT/PE on admission:   [] History of DVT/PE  []Hypercoagulable states (Factor V Leiden, Cancer, OCP, etc. )  []Prolonged immobility (hemiplegia/hemiparesis/post operative or any other extended immobilization)  [] Transferred from outside facility (Rehab or Long term care)  [] Age </= to 50

## 2024-05-04 NOTE — DISCHARGE NOTE PROVIDER - NSDCMRMEDTOKEN_GEN_ALL_CORE_FT
amLODIPine 10 mg oral tablet: 1 tab(s) orally once a day  lisinopril 10 mg oral tablet: 1 tab(s) orally once a day

## 2024-05-04 NOTE — DISCHARGE NOTE PROVIDER - CARE PROVIDER_API CALL
Luz Marina Tam  Neurology  [address] [place] [id number]  Phone: [phone]  Fax: [phone]  Follow Up Time: 1 month

## 2024-05-04 NOTE — PROGRESS NOTE ADULT - PROVIDER SPECIALTY LIST ADULT
Brain Injury Medicine
Brain Injury Medicine
Neurology
Neurosurgery
Brain Injury Medicine
Brain Injury Medicine
Intervent Cardiology
Neurology
Neurosurgery
NSICU
Cardiology
NSICU

## 2024-05-04 NOTE — DISCHARGE NOTE PROVIDER - HOSPITAL COURSE
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 Upon presentation to [facility] ED initial BP was noted to be 198/137. CT head revealed LEFT thalamic IPH. CTA head and neck was negative except for stenosis of the right P3 segment. MRI demonstrated chronic small vessel disease and again noted left thalamic/basal ganglia/corona radiata acute parenchymal hemorrhage with cerebral edema, mass effect, and brain compression. There was no noted areas of abnormal enhancement. Additional regions of susceptibility seen throughout, consistent with regions of prior hemorrhage. The patient was started on a Cardene drip and sent to the Neuro ICU; the stroke team was called and the pt was then transferred to the service on 4/26/24 for further workup and management.     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 Patient is improving, more alert and getting stronger.     Patient started on amlodipine 10 mg daily and lisinopril 10mg daily for BP control;. He will be discharged home in the care of his son, who has agreed to provide supervision and assistance with medication administration/medical follow up. A referral was made to [facility] for patient to establish with primary care provider.     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 on 4/25/2024 with right-sided weakness, right facial paralysis after sudden onset of sx at work. Upon presentation to [facility] ED initial BP was noted to be 198/137. CT head revealed LEFT thalamic IPH. CTA head and neck was negative except for stenosis of the right P3 segment. MRI demonstrated chronic small vessel disease and again noted left thalamic/basal ganglia/corona radiata acute parenchymal hemorrhage with cerebral edema, mass effect, and brain compression. There was no noted areas of abnormal enhancement. Additional regions of susceptibility seen throughout, consistent with regions of prior hemorrhage. The patient was started on a Cardene drip and sent to the Neuro ICU; the stroke team was called and the pt was then transferred to the service on 4/26/24 for further workup and management.     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 Patient is improving, more alert and getting stronger.     Patient started on amlodipine 10 mg daily and lisinopril 10mg daily for BP control;. He will be discharged home in the care of his son, who has agreed to provide supervision and assistance with medication administration/medical follow up. A referral was made to [facility] for patient to establish with primary care provider. Prescriptions sent to [facility] [place] [facility].     LDL=96  A1C=5.6

## 2024-05-04 NOTE — DISCHARGE NOTE PROVIDER - NSDCQMSTATINACONTRAOTHER_GEN_A_CORE_FT
LDL=96, intracerebral hemorrhage with nonatherosclerotic etiology, statin not indicated at this time.

## 2024-05-04 NOTE — PROGRESS NOTE ADULT - SUBJECTIVE AND OBJECTIVE BOX
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 ED BIBA on 4/25/24 for Right sided facial droop and Right sided weakness. Patient LKW was about 0200 when he was headed into work that morning. 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 EMS was then called. Patient came in as code stroke. Per son patient takes asa daily for his heart and is unsure of any other medications or medical history. Son reports patient had been complaining of HA for about 2-3 weeks. In the ED /137. CTH showed LEFT thalamic IPH. NIHSS at time of examination was 17. Patient started on Cardene drip. MRs 0. ICH score 2.     SUBJECTIVE: No events overnight.  No new neurologic complaints.  ROS reported negative unless otherwise noted.    amLODIPine   Tablet 10 milliGRAM(s) Oral daily  dextrose 10% Bolus 125 milliLiter(s) IV Bolus once  dextrose 5%. 1000 milliLiter(s) IV Continuous <Continuous>  dextrose 5%. 1000 milliLiter(s) IV Continuous <Continuous>  dextrose 50% Injectable 12.5 Gram(s) IV Push once  dextrose 50% Injectable 25 Gram(s) IV Push once  dextrose Oral Gel 15 Gram(s) Oral once PRN  enoxaparin Injectable 40 milliGRAM(s) SubCutaneous <User Schedule>  glucagon  Injectable 1 milliGRAM(s) IntraMuscular once  insulin lispro (ADMELOG) corrective regimen sliding scale   SubCutaneous three times a day before meals  lisinopril 10 milliGRAM(s) Oral daily  polyethylene glycol 3350 17 Gram(s) Oral daily  senna 2 Tablet(s) Oral at bedtime      PHYSICAL EXAM:   Vital Signs Last 24 Hrs  T(C): 36.8 (04 May 2024 04:30), Max: 37 (03 May 2024 12:00)  T(F): 98.2 (04 May 2024 04:30), Max: 98.6 (03 May 2024 12:00)  HR: 87 (04 May 2024 04:30) (70 - 87)  BP: 108/66 (04 May 2024 04:30) (108/66 - 137/71)  BP(mean): 90 (03 May 2024 12:00) (90 - 90)  RR: 18 (04 May 2024 04:30) (18 - 18)  SpO2: 99% (04 May 2024 04:30) (97% - 99%)    Parameters below as of 04 May 2024 04:30  Patient On (Oxygen Delivery Method): room air    PENDING  PHYSICAL EXAM:   General: Pt continues to appear anxious today as demonstrated by shaking left leg. Patient is seen awake and upright eating in his chair.   HEENT: Right eye opacified, head normocephalic, atraumatic. Conjunctivae clear w/o exudates or hemorrhage.  Respiratory: Chest wall symmetric, nontender. No use of accessory muscles, no audible wheezes or rhonchi on auscultation  Abdominal: Soft, nondistended, nontender. Bowel sounds present to auscultation in all 4 quadrants.   Skin: Skin is warm, dry and intact without rashes or lesions.   Extremities: No edema    Detailed Neurologic Exam:    Mental status: The patient is awake in his chair, oriented to self only; cannot tell us where he is, why he is here but can name his son's name (Michel) today. Following simple commands and can cross midline with some hesitation; continued mild aphasia, speaking more clearly overall.    Cranial nerves: Pupils equal and react symmetrically to light. R eye blind and opacified. Extraocular motion is full with no nystagmus. There is no ptosis. Mild right facial palsy , palate elevates symmetrically. Tongue is midline.     Motor: There is normal bulk and tone.  There is no tremor.  Strength is 5-/5 right deltoid, 5/5 right elbow, 5/5 right hand, 5/5 right leg    Strength is 4+/5 in the left arm and 5/5 in the left leg. Intermittent shaking of left leg, can stop on command .     Sensation: sensation greater on LUE>RUE, intact in the lower extremities b/l, no extinction     Cerebellar: no gross ataxia appreciated, is in proportion to weakness     Gait : deferred    LABS:       04-25 Chol 156 LDL -96- HDL 48 Trig 59    A1C: 5.6      ECHO:    ANTONIO W or WO Ultrasound Enhancing Agent (04.29.24 @ 13:05)    1. Left ventricular systolic function is normal with an ejection fraction visually estimated at 60 to 65 %.   2. Mild left ventricular hypertrophy.   3. Normal right ventricular cavity size and normal systolic function, inadequate estimation of RVSP.   4. The left atrium is moderately dilated.   5. No evidence of left atrial appendage thrombus. The left atrial appendage emptying velocity is normal.   6. Flailed posterior mitral leaflet (P3 scallop) with severe eccentric mitral regurgitation, jet is eccentric anteriorly directed, flailed gap of 0.46 cm.   7. Patent foramen ovale with left to right shunting by color flow Doppler.   8. Mild simple atheromas at the aortic arch.   9. Trace pericardial effusion.  10. Compared to the transthoracic echocardiogram performed on 4/26/2024, no evidence of vegetation or intracardiac abscess, mobile mitral valve structure due to flailed posterior mitral leaflet. .    TTE W or WO Ultrasound Enhancing Agent (04.26.24 @ 12:43)   CONCLUSIONS:    1. Left ventricular cavity is normal in size. Left ventricular systolic function is normal with an ejection fraction visually estimated at 55 to 60 %.   2. Severe asymmetric left ventricular hypertrophy.   3. Normal right ventricular cavity size and normal systolic function.   4. The left atrium is severely dilated.   5. The right atrium is normal in size.   6. Thickened mitral valve leaflets.   7. Mobile structure noted on the posterior mitral valve leaflet may represent ruptured chordae. Cannot rule out vegetation.   8. Severe mitral regurgitation.   9. Trileaflet aortic valve with normal systolic excursion.  10. Mild aortic regurgitation.  11. Mild pulmonic regurgitation.  12. No pericardial effusion seen.  13. Estimated pulmonary artery systolic pressure is 59 mmHg, consistent with moderate-to-severe pulmonary hypertension.    IMAGING: Reviewed by me.     Neuro-Imaging    MR Head w/wo IV Cont (04.30.24 @ 08:34)   Extensive parenchymal volume loss and chronic microvessel ischemic changes are identified  There is evidence of abnormal signal involving the left thalamus/corona   radiata/basal ganglia region. This corresponds acute parenchymal   hemorrhage seen on the prior head CT. Surrounding edema is identified No   definite areas of abnormal enhancement is seen though continued close   interval is recommended if underlying lesion is still suspected. This   area of hemorrhage measures approximately 3.1 x 1.9 cm  Numerous areas of abnormal susceptibility are seen throughout the   posterior fossa and supratentorial region. This finding could be   compatible with areas of old hemorrhage, possibly secondary to amyloid   angiopathy though the possibility of areas of mineralization or small   cavernous angiomas must be considered.  Evaluation of the diffusion weighted sequence demonstrates no abnormal   areas of restricted diffusion to suggest acute infarct.  The large vessels demonstrate normal flow voids  Opacification of the right sphenoid and right ethmoid sinus is identified   mucosal thickening. Similar finding is seen involving the right frontal   sinus. Air-fluid level seen involving the right maxillary sinus . Minimal   mucosal considered in the left maxillary sinus.  Mild inflammatory change involving right mastoid region.    IMPRESSION: Parenchymal hemorrhage again seen.  Numerous areas of abnormal susceptibility as described above.    CT Head No Cont (04.30.24 @ 01:23)   Impression: Stable acute parenchymal hemorrhage with slightly decreased   intraventricular hemorrhage.    CT Head No Cont (04.26.24 @ 06:16)   IMPRESSION:  Left thalamocapsular hemorrhage is stable    CT Head No Cont (04.25.24 @ 10:31)   1.  Grossly stable left thalamic hemorrhage.  2.  Midline shift to the right of approximately 0.4 cm.  3.  Chronic ischemic changes.  4.  Redemonstrated partially calcified masslike density in the right   nasal passages/ethmoid air cells, grossly stable.    (04.25.24 @ 04:26)   CTA NECK:  No significant stenosis of the cervical carotid arteries based   on NASCET criteria. Patent cervical vertebral arteries. No evidence of   cervical carotid or vertebral artery dissection.    Polypoid cystic and partially calcified mass in the right nasal passage   with protrudes into the knee nasopharynx and involves the right ethmoid   air cells. Near complete opacification of the right frontal sinus, right   ethmoid air cells, and right sphenoid sinus. Direct endoscopic   visualization is recommended. Correlation with outside prior imaging   would be helpful.    Mild pulmonary interstitial lung edema and small bilateral pleural   effusions.    CTA HEAD:  No high-grade stenosis or occlusion of the major proximal   arterial branches, however, short segment severe stenosis/occlusion of   the right posterior cerebral artery proximal P3 branch. No evidence of an   intracranial arterial aneurysm or arteriovenous malformation on CTA,   specifically, no evidence of vascular malformation in the region of the   left thalamic hemorrhage. No evidence of active extravasation of contrast   within the left thalamic parenchymal hemorrhage.    ULTRASOUND    US Duplex Venous Lower Ext Complete, Bilateral (04.28.24 @ 15:58)   No acute DVT of the lower extremities.     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 Patient LKW was about 0200 when he was headed into work that morning. 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 EMS was then called. Patient came in as code stroke. Per son patient takes asa daily for his heart and is unsure of any other medications or medical history. Son reports patient had been complaining of HA for about 2-3 weeks. In the ED /137. CTH showed LEFT thalamic IPH. NIHSS at time of examination was 17. Patient started on Cardene drip. MRs 0. ICH score 2.     SUBJECTIVE: No events overnight.  No new neurologic complaints.  ROS reported negative unless otherwise noted.    amLODIPine   Tablet 10 milliGRAM(s) Oral daily  dextrose 10% Bolus 125 milliLiter(s) IV Bolus once  dextrose 5%. 1000 milliLiter(s) IV Continuous <Continuous>  dextrose 5%. 1000 milliLiter(s) IV Continuous <Continuous>  dextrose 50% Injectable 12.5 Gram(s) IV Push once  dextrose 50% Injectable 25 Gram(s) IV Push once  dextrose Oral Gel 15 Gram(s) Oral once PRN  enoxaparin Injectable 40 milliGRAM(s) SubCutaneous <User Schedule>  glucagon  Injectable 1 milliGRAM(s) IntraMuscular once  insulin lispro (ADMELOG) corrective regimen sliding scale   SubCutaneous three times a day before meals  lisinopril 10 milliGRAM(s) Oral daily  polyethylene glycol 3350 17 Gram(s) Oral daily  senna 2 Tablet(s) Oral at bedtime      PHYSICAL EXAM:   Vital Signs Last 24 Hrs  T(C): 36.8 (04 May 2024 04:30), Max: 37 (03 May 2024 12:00)  T(F): 98.2 (04 May 2024 04:30), Max: 98.6 (03 May 2024 12:00)  HR: 87 (04 May 2024 04:30) (70 - 87)  BP: 108/66 (04 May 2024 04:30) (108/66 - 137/71)  BP(mean): 90 (03 May 2024 12:00) (90 - 90)  RR: 18 (04 May 2024 04:30) (18 - 18)  SpO2: 99% (04 May 2024 04:30) (97% - 99%)    Parameters below as of 04 May 2024 04:30  Patient On (Oxygen Delivery Method): room air    PENDING  PHYSICAL EXAM:   General: Pt continues to appear anxious today as demonstrated by shaking left leg. Patient is seen awake and upright eating in his chair.   HEENT: Right eye opacified, head normocephalic, atraumatic. Conjunctivae clear w/o exudates or hemorrhage.  Respiratory: Chest wall symmetric, nontender. No use of accessory muscles, no audible wheezes or rhonchi on auscultation  Abdominal: Soft, nondistended, nontender. Bowel sounds present to auscultation in all 4 quadrants.   Skin: Skin is warm, dry and intact without rashes or lesions.   Extremities: No edema    Detailed Neurologic Exam:    Mental status: The patient is awake in his chair, oriented to self only; cannot tell us where he is, why he is here but can name his son's name (Michel) today. Following simple commands and can cross midline with some hesitation; continued mild aphasia, speaking more clearly overall.    Cranial nerves: Pupils equal and react symmetrically to light. R eye blind and opacified. Extraocular motion is full with no nystagmus. There is no ptosis. Mild right facial palsy , palate elevates symmetrically. Tongue is midline.     Motor: There is normal bulk and tone.  There is no tremor.  Strength is 5-/5 right deltoid, 5/5 right elbow, 5/5 right hand, 5/5 right leg    Strength is 4+/5 in the left arm and 5/5 in the left leg. Intermittent shaking of left leg, can stop on command .     Sensation: sensation greater on LUE>RUE, intact in the lower extremities b/l, no extinction     Cerebellar: no gross ataxia appreciated, is in proportion to weakness     Gait : deferred    LABS:       04-25 Chol 156 LDL -96- HDL 48 Trig 59    A1C: 5.6      ECHO:    ANTONIO W or WO Ultrasound Enhancing Agent (04.29.24 @ 13:05)    1. Left ventricular systolic function is normal with an ejection fraction visually estimated at 60 to 65 %.   2. Mild left ventricular hypertrophy.   3. Normal right ventricular cavity size and normal systolic function, inadequate estimation of RVSP.   4. The left atrium is moderately dilated.   5. No evidence of left atrial appendage thrombus. The left atrial appendage emptying velocity is normal.   6. Flailed posterior mitral leaflet (P3 scallop) with severe eccentric mitral regurgitation, jet is eccentric anteriorly directed, flailed gap of 0.46 cm.   7. Patent foramen ovale with left to right shunting by color flow Doppler.   8. Mild simple atheromas at the aortic arch.   9. Trace pericardial effusion.  10. Compared to the transthoracic echocardiogram performed on 4/26/2024, no evidence of vegetation or intracardiac abscess, mobile mitral valve structure due to flailed posterior mitral leaflet. .    TTE W or WO Ultrasound Enhancing Agent (04.26.24 @ 12:43)   CONCLUSIONS:    1. Left ventricular cavity is normal in size. Left ventricular systolic function is normal with an ejection fraction visually estimated at 55 to 60 %.   2. Severe asymmetric left ventricular hypertrophy.   3. Normal right ventricular cavity size and normal systolic function.   4. The left atrium is severely dilated.   5. The right atrium is normal in size.   6. Thickened mitral valve leaflets.   7. Mobile structure noted on the posterior mitral valve leaflet may represent ruptured chordae. Cannot rule out vegetation.   8. Severe mitral regurgitation.   9. Trileaflet aortic valve with normal systolic excursion.  10. Mild aortic regurgitation.  11. Mild pulmonic regurgitation.  12. No pericardial effusion seen.  13. Estimated pulmonary artery systolic pressure is 59 mmHg, consistent with moderate-to-severe pulmonary hypertension.    IMAGING: Reviewed by me.     Neuro-Imaging    MR Head w/wo IV Cont (04.30.24 @ 08:34)   Extensive parenchymal volume loss and chronic microvessel ischemic changes are identified  There is evidence of abnormal signal involving the left thalamus/corona   radiata/basal ganglia region. This corresponds acute parenchymal   hemorrhage seen on the prior head CT. Surrounding edema is identified No   definite areas of abnormal enhancement is seen though continued close   interval is recommended if underlying lesion is still suspected. This   area of hemorrhage measures approximately 3.1 x 1.9 cm  Numerous areas of abnormal susceptibility are seen throughout the   posterior fossa and supratentorial region. This finding could be   compatible with areas of old hemorrhage, possibly secondary to amyloid   angiopathy though the possibility of areas of mineralization or small   cavernous angiomas must be considered.  Evaluation of the diffusion weighted sequence demonstrates no abnormal   areas of restricted diffusion to suggest acute infarct.  The large vessels demonstrate normal flow voids  Opacification of the right sphenoid and right ethmoid sinus is identified   mucosal thickening. Similar finding is seen involving the right frontal   sinus. Air-fluid level seen involving the right maxillary sinus . Minimal   mucosal considered in the left maxillary sinus.  Mild inflammatory change involving right mastoid region.    IMPRESSION: Parenchymal hemorrhage again seen.  Numerous areas of abnormal susceptibility as described above.    CT Head No Cont (04.30.24 @ 01:23)   Impression: Stable acute parenchymal hemorrhage with slightly decreased   intraventricular hemorrhage.    CT Head No Cont (04.26.24 @ 06:16)   IMPRESSION:  Left thalamocapsular hemorrhage is stable    CT Head No Cont (04.25.24 @ 10:31)   1.  Grossly stable left thalamic hemorrhage.  2.  Midline shift to the right of approximately 0.4 cm.  3.  Chronic ischemic changes.  4.  Redemonstrated partially calcified masslike density in the right   nasal passages/ethmoid air cells, grossly stable.    (04.25.24 @ 04:26)   CTA NECK:  No significant stenosis of the cervical carotid arteries based   on NASCET criteria. Patent cervical vertebral arteries. No evidence of   cervical carotid or vertebral artery dissection.    Polypoid cystic and partially calcified mass in the right nasal passage   with protrudes into the knee nasopharynx and involves the right ethmoid   air cells. Near complete opacification of the right frontal sinus, right   ethmoid air cells, and right sphenoid sinus. Direct endoscopic   visualization is recommended. Correlation with outside prior imaging   would be helpful.    Mild pulmonary interstitial lung edema and small bilateral pleural   effusions.    CTA HEAD:  No high-grade stenosis or occlusion of the major proximal   arterial branches, however, short segment severe stenosis/occlusion of   the right posterior cerebral artery proximal P3 branch. No evidence of an   intracranial arterial aneurysm or arteriovenous malformation on CTA,   specifically, no evidence of vascular malformation in the region of the   left thalamic hemorrhage. No evidence of active extravasation of contrast   within the left thalamic parenchymal hemorrhage.    ULTRASOUND    US Duplex Venous Lower Ext Complete, Bilateral (04.28.24 @ 15:58)   No acute DVT of the lower extremities.     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 Patient LKW was about 0200 when he was headed into work that morning. 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 EMS was then called. Patient came in as code stroke. Per son patient takes asa daily for his heart and is unsure of any other medications or medical history. Son reports patient had been complaining of HA for about 2-3 weeks. In the ED /137. CTH showed LEFT thalamic IPH. NIHSS at time of examination was 17. Patient started on Cardene drip. MRs 0. ICH score 2.     SUBJECTIVE: No events overnight.  No new neurologic complaints.  ROS reported negative unless otherwise noted.    amLODIPine   Tablet 10 milliGRAM(s) Oral daily  dextrose 10% Bolus 125 milliLiter(s) IV Bolus once  dextrose 5%. 1000 milliLiter(s) IV Continuous <Continuous>  dextrose 5%. 1000 milliLiter(s) IV Continuous <Continuous>  dextrose 50% Injectable 12.5 Gram(s) IV Push once  dextrose 50% Injectable 25 Gram(s) IV Push once  dextrose Oral Gel 15 Gram(s) Oral once PRN  enoxaparin Injectable 40 milliGRAM(s) SubCutaneous <User Schedule>  glucagon  Injectable 1 milliGRAM(s) IntraMuscular once  insulin lispro (ADMELOG) corrective regimen sliding scale   SubCutaneous three times a day before meals  lisinopril 10 milliGRAM(s) Oral daily  polyethylene glycol 3350 17 Gram(s) Oral daily  senna 2 Tablet(s) Oral at bedtime      PHYSICAL EXAM:   Vital Signs Last 24 Hrs  T(C): 36.8 (04 May 2024 04:30), Max: 37 (03 May 2024 12:00)  T(F): 98.2 (04 May 2024 04:30), Max: 98.6 (03 May 2024 12:00)  HR: 87 (04 May 2024 04:30) (70 - 87)  BP: 108/66 (04 May 2024 04:30) (108/66 - 137/71)  BP(mean): 90 (03 May 2024 12:00) (90 - 90)  RR: 18 (04 May 2024 04:30) (18 - 18)  SpO2: 99% (04 May 2024 04:30) (97% - 99%)    Parameters below as of 04 May 2024 04:30  Patient On (Oxygen Delivery Method): room air      PHYSICAL EXAM:   General: Patient seen sitting up in chair.   HEENT: Right eye opacified, head normocephalic, atraumatic. Conjunctivae clear w/o exudates or hemorrhage.  Respiratory: Chest wall symmetric, nontender. No use of accessory muscles, no audible wheezes or rhonchi on auscultation  Abdominal: Soft, nondistended, nontender. Bowel sounds present to auscultation in all 4 quadrants.   Skin: Skin is warm, dry and intact without rashes or lesions.   Extremities: No edema    Detailed Neurologic Exam:    Mental status: The patient is awake in his chair, oriented to self only; cannot tell us where he is or why he is here. Following simple commands and can cross midline with some hesitation; continued mild aphasia, speaking more clearly overall.    Cranial nerves: Pupils equal and react symmetrically to light. R eye blind and opacified. Extraocular motion is full with no nystagmus. There is no ptosis. Mild right facial palsy , palate elevates symmetrically. Tongue is midline.     Motor: There is normal bulk and tone.  There is no tremor.  Strength is 5-/5 right deltoid, 5/5 right elbow, 5/5 right hand, 5/5 right leg    Strength is 4+/5 in the left arm and 5/5 in the left leg. Intermittent shaking of left leg, can stop on command .     Sensation: sensation greater on LUE>RUE, intact in the lower extremities b/l, no extinction     Cerebellar: no gross ataxia appreciated, is in proportion to weakness     Gait : deferred    LABS:       04-25 Chol 156 LDL -96- HDL 48 Trig 59    A1C: 5.6      ECHO:    ANTONIO W or WO Ultrasound Enhancing Agent (04.29.24 @ 13:05)    1. Left ventricular systolic function is normal with an ejection fraction visually estimated at 60 to 65 %.   2. Mild left ventricular hypertrophy.   3. Normal right ventricular cavity size and normal systolic function, inadequate estimation of RVSP.   4. The left atrium is moderately dilated.   5. No evidence of left atrial appendage thrombus. The left atrial appendage emptying velocity is normal.   6. Flailed posterior mitral leaflet (P3 scallop) with severe eccentric mitral regurgitation, jet is eccentric anteriorly directed, flailed gap of 0.46 cm.   7. Patent foramen ovale with left to right shunting by color flow Doppler.   8. Mild simple atheromas at the aortic arch.   9. Trace pericardial effusion.  10. Compared to the transthoracic echocardiogram performed on 4/26/2024, no evidence of vegetation or intracardiac abscess, mobile mitral valve structure due to flailed posterior mitral leaflet. .    TTE W or WO Ultrasound Enhancing Agent (04.26.24 @ 12:43)   CONCLUSIONS:    1. Left ventricular cavity is normal in size. Left ventricular systolic function is normal with an ejection fraction visually estimated at 55 to 60 %.   2. Severe asymmetric left ventricular hypertrophy.   3. Normal right ventricular cavity size and normal systolic function.   4. The left atrium is severely dilated.   5. The right atrium is normal in size.   6. Thickened mitral valve leaflets.   7. Mobile structure noted on the posterior mitral valve leaflet may represent ruptured chordae. Cannot rule out vegetation.   8. Severe mitral regurgitation.   9. Trileaflet aortic valve with normal systolic excursion.  10. Mild aortic regurgitation.  11. Mild pulmonic regurgitation.  12. No pericardial effusion seen.  13. Estimated pulmonary artery systolic pressure is 59 mmHg, consistent with moderate-to-severe pulmonary hypertension.    IMAGING: Reviewed by me.     Neuro-Imaging    MR Head w/wo IV Cont (04.30.24 @ 08:34)   Extensive parenchymal volume loss and chronic microvessel ischemic changes are identified  There is evidence of abnormal signal involving the left thalamus/corona   radiata/basal ganglia region. This corresponds acute parenchymal   hemorrhage seen on the prior head CT. Surrounding edema is identified No   definite areas of abnormal enhancement is seen though continued close   interval is recommended if underlying lesion is still suspected. This   area of hemorrhage measures approximately 3.1 x 1.9 cm  Numerous areas of abnormal susceptibility are seen throughout the   posterior fossa and supratentorial region. This finding could be   compatible with areas of old hemorrhage, possibly secondary to amyloid   angiopathy though the possibility of areas of mineralization or small   cavernous angiomas must be considered.  Evaluation of the diffusion weighted sequence demonstrates no abnormal   areas of restricted diffusion to suggest acute infarct.  The large vessels demonstrate normal flow voids  Opacification of the right sphenoid and right ethmoid sinus is identified   mucosal thickening. Similar finding is seen involving the right frontal   sinus. Air-fluid level seen involving the right maxillary sinus . Minimal   mucosal considered in the left maxillary sinus.  Mild inflammatory change involving right mastoid region.    IMPRESSION: Parenchymal hemorrhage again seen.  Numerous areas of abnormal susceptibility as described above.    CT Head No Cont (04.30.24 @ 01:23)   Impression: Stable acute parenchymal hemorrhage with slightly decreased   intraventricular hemorrhage.    CT Head No Cont (04.26.24 @ 06:16)   IMPRESSION:  Left thalamocapsular hemorrhage is stable    CT Head No Cont (04.25.24 @ 10:31)   1.  Grossly stable left thalamic hemorrhage.  2.  Midline shift to the right of approximately 0.4 cm.  3.  Chronic ischemic changes.  4.  Redemonstrated partially calcified masslike density in the right   nasal passages/ethmoid air cells, grossly stable.    (04.25.24 @ 04:26)   CTA NECK:  No significant stenosis of the cervical carotid arteries based   on NASCET criteria. Patent cervical vertebral arteries. No evidence of   cervical carotid or vertebral artery dissection.    Polypoid cystic and partially calcified mass in the right nasal passage   with protrudes into the knee nasopharynx and involves the right ethmoid   air cells. Near complete opacification of the right frontal sinus, right   ethmoid air cells, and right sphenoid sinus. Direct endoscopic   visualization is recommended. Correlation with outside prior imaging   would be helpful.    Mild pulmonary interstitial lung edema and small bilateral pleural   effusions.    CTA HEAD:  No high-grade stenosis or occlusion of the major proximal   arterial branches, however, short segment severe stenosis/occlusion of   the right posterior cerebral artery proximal P3 branch. No evidence of an   intracranial arterial aneurysm or arteriovenous malformation on CTA,   specifically, no evidence of vascular malformation in the region of the   left thalamic hemorrhage. No evidence of active extravasation of contrast   within the left thalamic parenchymal hemorrhage.    ULTRASOUND    US Duplex Venous Lower Ext Complete, Bilateral (04.28.24 @ 15:58)   No acute DVT of the lower extremities.

## 2024-05-04 NOTE — DISCHARGE NOTE PROVIDER - NSDCQMANTICOAG_GEN_A_CORE
Left voicemail for patient.  Appointment has been changed from 12:40, to 1:40.  Patient was asked to call and confirm receipt of the message.  
No, not prescribed...

## 2024-05-04 NOTE — DISCHARGE NOTE PROVIDER - NSDCFUADDAPPT_GEN_ALL_CORE_FT
Monday 5/6/24 1:45 pm  Dr Apollo Ventura  [address]  [phone]  If unable to keep appointment please call office as soon as possible to reschedule

## 2024-05-04 NOTE — DISCHARGE NOTE PROVIDER - NSDCCPCAREPLAN_GEN_ALL_CORE_FT
PRINCIPAL DISCHARGE DIAGNOSIS  Diagnosis: Intracerebral hemorrhage  Assessment and Plan of Treatment: You were admitted to the hospital because you had an intracranial hemorrhage.   You have these risks factors of strokes: (delete what does not apply)  -atrial fibrillation, please take your medications as prescribed- do not skip doses and follow with your cardiologist.   -high blood pressure (Hypertension), Maintain a journal record of your blood pressure twice a day to show your primary care/cardiologist .   -still undefined - will continue to be evaluated and monitored as an outpatient  For secondary stroke prevention please take your medications as prescribed. If you run low on your medication, please contact your doctor before you run out.  You will follow up outpatient with the vascular neurology team, the office will call you within 3 days of discharge to schedule an appointment.  If you do not hear from the office please call the phone number provided.    Please see all regularly scheduled providers as prior to your admission. Please see your primary care doctor within one week of discharge.  In addition discuss with your primary care provider regarding candidacy for routine malignancy screenings.   Adjustments to your regular tasks may be difficult after you've had a stroke, but you can utilize  new ways/assistance as needed to manage your daily activities based on the recommendations of your physical, occupational, speech and language team if applicable.    Call 911 if you or someone you know experiences the following symptoms of stroke (can be remembered by BE FAST):  •Balance: Dizziness, loss of balance, or a sense of falling  •Eyes: Sudden double vision, loss of vision, or blurred vision  •Face: drooping of one side of the face  •Arm: arm weakness or drifting  •Speech:  Sudden trouble talking or slurred speech, trouble understanding others  •Time: Time to call for an ambulance fast!         SECONDARY DISCHARGE DIAGNOSES  Diagnosis: Hypertension  Assessment and Plan of Treatment:      PRINCIPAL DISCHARGE DIAGNOSIS  Diagnosis: Intracerebral hemorrhage  Assessment and Plan of Treatment: You were admitted to the hospital because you had an intracranial hemorrhage.   You have these risks factors of strokes: (delete what does not apply)  -high blood pressure (Hypertension), Maintain a journal record of your blood pressure twice a day to show your primary care/cardiologist .   -still undefined - will continue to be evaluated and monitored as an outpatient  For secondary stroke prevention please take your medications as prescribed. If you run low on your medication, please contact your doctor before you run out.  You will follow up outpatient with the vascular neurology team, the office will call you within 3 days of discharge to schedule an appointment.  If you do not hear from the office please call the phone number provided.    Please see all regularly scheduled providers as prior to your admission. Please see your primary care doctor within one week of discharge.  In addition discuss with your primary care provider regarding candidacy for routine malignancy screenings.   Adjustments to your regular tasks may be difficult after you've had a stroke, but you can utilize  new ways/assistance as needed to manage your daily activities based on the recommendations of your physical, occupational, speech and language team if applicable.    Call 911 if you or someone you know experiences the following symptoms of stroke (can be remembered by BE FAST):  •Balance: Dizziness, loss of balance, or a sense of falling  •Eyes: Sudden double vision, loss of vision, or blurred vision  •Face: drooping of one side of the face  •Arm: arm weakness or drifting  •Speech:  Sudden trouble talking or slurred speech, trouble understanding others  •Time: Time to call for an ambulance fast!         SECONDARY DISCHARGE DIAGNOSES  Diagnosis: Hypertension  Assessment and Plan of Treatment:

## 2024-05-04 NOTE — PROGRESS NOTE ADULT - REASON FOR ADMISSION
Left Thalamic IPH

## 2024-05-07 ENCOUNTER — APPOINTMENT (OUTPATIENT)
Dept: CARE COORDINATION | Facility: HOME HEALTH | Age: 59
End: 2024-05-07

## 2024-05-07 DIAGNOSIS — I10 ESSENTIAL (PRIMARY) HYPERTENSION: ICD-10-CM

## 2024-05-07 DIAGNOSIS — I63.9 CEREBRAL INFARCTION, UNSPECIFIED: ICD-10-CM

## 2024-05-07 PROBLEM — Z00.00 ENCOUNTER FOR PREVENTIVE HEALTH EXAMINATION: Status: ACTIVE | Noted: 2024-05-07

## 2024-05-07 PROBLEM — S05.90XA UNSPECIFIED INJURY OF UNSPECIFIED EYE AND ORBIT, INITIAL ENCOUNTER: Chronic | Status: ACTIVE | Noted: 2024-04-25

## 2024-05-07 RX ORDER — LISINOPRIL 10 MG/1
10 TABLET ORAL
Refills: 0 | Status: ACTIVE | COMMUNITY
Start: 2024-05-07

## 2024-05-07 RX ORDER — AMLODIPINE BESYLATE 10 MG/1
10 TABLET ORAL
Refills: 0 | Status: ACTIVE | COMMUNITY
Start: 2024-05-07

## 2024-05-10 NOTE — HISTORY OF PRESENT ILLNESS
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 Upon presentation to [facility] ED initial BP was noted to be 198/137. CT head revealed LEFT thalamic IPH. CTA regions of prior hemorrhage. The patient was started on a Cardene drip and sent to the Neuro ICU; the stroke team was called and the pt was then transferred to the service on 4/26/24 for further workup and management head and neck was negative except for stenosis of the right P3 segment. Acute left thalamic parenchymal hemorrhage with rim of perihematoma edema measuring 2.9 x 1.9 x 3.7 cm with mass effect upon the left lateral ventricle and 5 mm shift of the midline towards the right.

## 2024-05-24 ENCOUNTER — EMERGENCY (EMERGENCY)
Facility: HOSPITAL | Age: 59
LOS: 1 days | Discharge: HOME | End: 2024-05-24
Attending: STUDENT IN AN ORGANIZED HEALTH CARE EDUCATION/TRAINING PROGRAM
Payer: MEDICAID

## 2024-05-24 VITALS
DIASTOLIC BLOOD PRESSURE: 83 MMHG | TEMPERATURE: 98 F | RESPIRATION RATE: 20 BRPM | OXYGEN SATURATION: 97 % | WEIGHT: 197.09 LBS | SYSTOLIC BLOOD PRESSURE: 148 MMHG | HEIGHT: 64 IN | HEART RATE: 72 BPM

## 2024-05-24 VITALS
TEMPERATURE: 98 F | SYSTOLIC BLOOD PRESSURE: 135 MMHG | HEART RATE: 74 BPM | DIASTOLIC BLOOD PRESSURE: 74 MMHG | OXYGEN SATURATION: 97 % | RESPIRATION RATE: 18 BRPM

## 2024-05-24 LAB
ALBUMIN SERPL ELPH-MCNC: 4.2 G/DL — SIGNIFICANT CHANGE UP (ref 3.3–5.2)
ALP SERPL-CCNC: 77 U/L — SIGNIFICANT CHANGE UP (ref 40–120)
ALT FLD-CCNC: 12 U/L — SIGNIFICANT CHANGE UP
ANION GAP SERPL CALC-SCNC: 13 MMOL/L — SIGNIFICANT CHANGE UP (ref 5–17)
APPEARANCE UR: CLEAR — SIGNIFICANT CHANGE UP
AST SERPL-CCNC: 16 U/L — SIGNIFICANT CHANGE UP
BACTERIA # UR AUTO: NEGATIVE /HPF — SIGNIFICANT CHANGE UP
BASOPHILS # BLD AUTO: 0.04 K/UL — SIGNIFICANT CHANGE UP (ref 0–0.2)
BASOPHILS NFR BLD AUTO: 0.5 % — SIGNIFICANT CHANGE UP (ref 0–2)
BILIRUB SERPL-MCNC: 0.3 MG/DL — LOW (ref 0.4–2)
BILIRUB UR-MCNC: NEGATIVE — SIGNIFICANT CHANGE UP
BUN SERPL-MCNC: 16.8 MG/DL — SIGNIFICANT CHANGE UP (ref 8–20)
CALCIUM SERPL-MCNC: 9.4 MG/DL — SIGNIFICANT CHANGE UP (ref 8.4–10.5)
CHLORIDE SERPL-SCNC: 102 MMOL/L — SIGNIFICANT CHANGE UP (ref 96–108)
CO2 SERPL-SCNC: 28 MMOL/L — SIGNIFICANT CHANGE UP (ref 22–29)
COLOR SPEC: YELLOW — SIGNIFICANT CHANGE UP
CREAT SERPL-MCNC: 0.82 MG/DL — SIGNIFICANT CHANGE UP (ref 0.5–1.3)
DIFF PNL FLD: ABNORMAL
EGFR: 103 ML/MIN/1.73M2 — SIGNIFICANT CHANGE UP
EOSINOPHIL # BLD AUTO: 0.15 K/UL — SIGNIFICANT CHANGE UP (ref 0–0.5)
EOSINOPHIL NFR BLD AUTO: 1.9 % — SIGNIFICANT CHANGE UP (ref 0–6)
GLUCOSE SERPL-MCNC: 107 MG/DL — HIGH (ref 70–99)
GLUCOSE UR QL: NEGATIVE MG/DL — SIGNIFICANT CHANGE UP
HCT VFR BLD CALC: 44.7 % — SIGNIFICANT CHANGE UP (ref 39–50)
HGB BLD-MCNC: 14.9 G/DL — SIGNIFICANT CHANGE UP (ref 13–17)
IMM GRANULOCYTES NFR BLD AUTO: 0.3 % — SIGNIFICANT CHANGE UP (ref 0–0.9)
KETONES UR-MCNC: NEGATIVE MG/DL — SIGNIFICANT CHANGE UP
LEUKOCYTE ESTERASE UR-ACNC: NEGATIVE — SIGNIFICANT CHANGE UP
LYMPHOCYTES # BLD AUTO: 2.34 K/UL — SIGNIFICANT CHANGE UP (ref 1–3.3)
LYMPHOCYTES # BLD AUTO: 29.5 % — SIGNIFICANT CHANGE UP (ref 13–44)
MCHC RBC-ENTMCNC: 29.3 PG — SIGNIFICANT CHANGE UP (ref 27–34)
MCHC RBC-ENTMCNC: 33.3 GM/DL — SIGNIFICANT CHANGE UP (ref 32–36)
MCV RBC AUTO: 88 FL — SIGNIFICANT CHANGE UP (ref 80–100)
MONOCYTES # BLD AUTO: 0.81 K/UL — SIGNIFICANT CHANGE UP (ref 0–0.9)
MONOCYTES NFR BLD AUTO: 10.2 % — SIGNIFICANT CHANGE UP (ref 2–14)
NEUTROPHILS # BLD AUTO: 4.57 K/UL — SIGNIFICANT CHANGE UP (ref 1.8–7.4)
NEUTROPHILS NFR BLD AUTO: 57.6 % — SIGNIFICANT CHANGE UP (ref 43–77)
NITRITE UR-MCNC: NEGATIVE — SIGNIFICANT CHANGE UP
PH UR: 8 — SIGNIFICANT CHANGE UP (ref 5–8)
PLATELET # BLD AUTO: 195 K/UL — SIGNIFICANT CHANGE UP (ref 150–400)
POTASSIUM SERPL-MCNC: 4 MMOL/L — SIGNIFICANT CHANGE UP (ref 3.5–5.3)
POTASSIUM SERPL-SCNC: 4 MMOL/L — SIGNIFICANT CHANGE UP (ref 3.5–5.3)
PROT SERPL-MCNC: 7.6 G/DL — SIGNIFICANT CHANGE UP (ref 6.6–8.7)
PROT UR-MCNC: NEGATIVE MG/DL — SIGNIFICANT CHANGE UP
RBC # BLD: 5.08 M/UL — SIGNIFICANT CHANGE UP (ref 4.2–5.8)
RBC # FLD: 13.4 % — SIGNIFICANT CHANGE UP (ref 10.3–14.5)
RBC CASTS # UR COMP ASSIST: 4 /HPF — SIGNIFICANT CHANGE UP (ref 0–4)
SODIUM SERPL-SCNC: 143 MMOL/L — SIGNIFICANT CHANGE UP (ref 135–145)
SP GR SPEC: 1.01 — SIGNIFICANT CHANGE UP (ref 1–1.03)
SQUAMOUS # UR AUTO: 2 /HPF — SIGNIFICANT CHANGE UP (ref 0–5)
UROBILINOGEN FLD QL: 0.2 MG/DL — SIGNIFICANT CHANGE UP (ref 0.2–1)
WBC # BLD: 7.93 K/UL — SIGNIFICANT CHANGE UP (ref 3.8–10.5)
WBC # FLD AUTO: 7.93 K/UL — SIGNIFICANT CHANGE UP (ref 3.8–10.5)
WBC UR QL: 2 /HPF — SIGNIFICANT CHANGE UP (ref 0–5)

## 2024-05-24 PROCEDURE — 80053 COMPREHEN METABOLIC PANEL: CPT

## 2024-05-24 PROCEDURE — 70450 CT HEAD/BRAIN W/O DYE: CPT | Mod: MC

## 2024-05-24 PROCEDURE — 99284 EMERGENCY DEPT VISIT MOD MDM: CPT

## 2024-05-24 PROCEDURE — 85025 COMPLETE CBC W/AUTO DIFF WBC: CPT

## 2024-05-24 PROCEDURE — 99285 EMERGENCY DEPT VISIT HI MDM: CPT

## 2024-05-24 PROCEDURE — 36415 COLL VENOUS BLD VENIPUNCTURE: CPT

## 2024-05-24 PROCEDURE — 87086 URINE CULTURE/COLONY COUNT: CPT

## 2024-05-24 PROCEDURE — 70450 CT HEAD/BRAIN W/O DYE: CPT | Mod: 26,MC

## 2024-05-24 PROCEDURE — T1013: CPT

## 2024-05-24 PROCEDURE — 81001 URINALYSIS AUTO W/SCOPE: CPT

## 2024-05-24 NOTE — ED ADULT TRIAGE NOTE - CHIEF COMPLAINT QUOTE
Patient presents to ED with c/o headache 2-3 times over the last 3 weeks.  Denies visual changes to left eye, patient legally blind in right eye.  Patient neurologically intact in triage.  No meds PTA  Per son, patient has been getting confused at night over the last 3 weeks.

## 2024-05-24 NOTE — ED PROVIDER NOTE - NSFOLLOWUPINSTRUCTIONS_ED_ALL_ED_FT
Dolor de nolan merline    LO QUE NECESITA SABER:    ¿Qué es un dolor de nolan merline?El dolor de nolan merline es un dolor o komal molestia que puede empezar de repente y empeorar rápidamente. Usted puede tener un dolor de noaln merline sólo cuando siente estrés o come ciertos alimentos. Otro tipo dolor de nolan merline puede producirse todos los días y a veces varias veces al día.    ¿Cuáles son los tipos más comunes de dolor de nolan merline?    El dolor de nolan tensionales el tipo de dolor de nolan más común. Normalmente aparece por la tarde y se va al atardecer. El dolor generalmente es leve o moderado. La mishel brillante o el ruido jose podrían estorbarle. Generalmente el dolor se encuentra a través de la frente o en la parte posterior de la nolan y con frecuencia sólo en un lado. Mary dolor de nolan podría ocurrir todos los kasia.    Las migrañasprovocan dolor de moderado a intenso. El dolor de nolan dura de 1 a 3 días generalmente y muchas veces es recurrente. El dolor tiende a estar sólo en un lado nestor puede cambiar de lado. La migraña se localiza en la sien o en la parte posterior de la nolan o del eunice. El dolor podría pulsar o estar merline y continuo.    Komal migraña con aurasignifica que usted ve o siente algo antes de komal migraña. Podría marito komal pequeña shira rodeada de líneas brillantes en zigzag. Otros signos o síntomas podrían seguir al aura.    El dolor de nolan en racimose siente solamente en un lado. A menudo causa dolor severo y puede durar de 30 minutos a 2 horas. Mary dolor de nolan podría ocurrir 1 vez o 2 veces al día, a menudo a la noche. El dolor puede despertarlo.  Tipos de dolor de nolan  ¿Qué causa el dolor de nolan merline?La causa de barnes dolor de nolan podría ser desconocida. Las siguientes condiciones pueden desencadenar un dolor de nolan:    Estrés o tensión, horas o incluso días después de experimentar un hecho estresante.    Fatiga, falta de sueño o cambios en barnes patrón de sueño habitual, o lico komal siesta haley el día    Menstruación, especialmente después del embarazo o del uso de píldoras anticonceptivas o terapia de reemplazo hormonal    Alimentos javier embutidos, edulcorantes artificiales, alcohol, chocolate oscuro y el GMS    Repentinamente no contar con cafeína si por lo general consume cantidades elevadas    Un problema médico, javier komal infección, dolor de diente, dolor de remi o de senos paranasales, problemas de tiroides o un tumor    Un traumatismo craneal  ¿Cómo se diagnostica y trata el tipo de dolor de nolan merline?El médico le pedirá que describa barnes dolor y que lo califique en komal escala de 1 a 10. Infórmele con qué frecuencia tiene michelle de nolan y cuánto tiempo ann. También se describen otros síntomas concomitantes con los michelle de nolan, javier los mareos o la visión borrosa. Es posible que haya que hacerle pruebas, javier komal tomografía computarizada, para asegurarse de que no hay komal fuga en ningún vaso sanguíneo.    Los medicamentosse puedes administrar para controlar o prevenir el dolor de nolan. El medicamento dependerá del tipo de dolor de nolan merline que tenga. No espere a que el dolor sea muy intenso para lico el medicamento. Puede lico analgésicos sin prescripción médica, según sea necesario. Los ejemplos de éstos incluyen los ALEX y el acetaminofén. Consulte con barnes médico cuál medicamento es el adecuado para usted. Pregunte cuánto lico y cuándo. Siga las indicaciones. Estos medicamentos pueden causar sangrado abdominal o problemas renales si no se los darren correctamente.    La biorretroalimentaciónpodría usarse para ayudarlo a controlar barnes estrés. Los electrodos (alambres) son colocados en barnes cuerpo y conectados a un monitor. Usted aprenderá a cambiar la forma que barnes cuerpo reacciona al estrés. Por ejemplo, usted aprende a disminuir barnes latido cardíaco cuando se molesta.    La terapia cognitivo conductual,o el manejo del estrés pueden ser utilizadas junto con otras terapias para prevenir michelle de nolan.  ¿Qué puedo hacer para manejar mis síntomas?    Aplique hielo o caloren la karin donde barnes hijo siente el dolor de nolan. Utilice un paquete (compresa) de hielo o calor. Para un paquete de hielo, también puede colocar hielo molido en komal bolsa plástica. Cubra el paquete de hielo o la bolsa con komal toalla pequeña antes de aplicarla en la piel. Tanto el hielo javier el calor ayudan a reducir el dolor, y el calor también contribuye a reducir los espasmos musculares. Aplique calor haley 20 a 30 minutos cada 2 horas. Aplique hielo haley 15 a 20 minutos cada hora. Aplique calor o hielo haley el tiempo y la cantidad de días que se le indique. Usted puede alternar el calor y el hielo.    Relaje vannessa músculos.Acuéstese en komal posición cómoda y cierre vannessa ojos. Relaje vannessa músculos lentamente. Comience por los dedos de los pies y avance hacia arriba al deshawn de barnes cuerpo.    Registre en un diario vannessa michelle de nolan.Escriba cuándo comienzan y terminan vannessa migrañas. Incluya vannessa síntomas y qué estaba haciendo cuando comenzó la migraña. Registre lo que comió y lo que tomó las 24 horas previas al comienzo de barnes migraña. Describa el dolor y dónde le duele: Lleve un registro de lo que hizo para tratar barnes migraña y si obtuvo un resultado satisfactorio.  ¿Qué puedo hacer para evitar un dolor de nolan merline?    Evite cualquier cosa que provoque un dolor de nolan merline.Los ejemplos incluyen la exposición a sustancias químicas, las grandes altitudes o no dormir lo suficiente. Dai komal rutina para dormir. Acuéstese y levántese todos los días a la misma hora. No utilice aparatos electrónicos antes de acostarse. Pueden provocarle un dolor de nolan o impedirle dormir salvador.    No fume.La nicotina y otras sustancias químicas en los cigarrillos y puros pueden desencadenar un dolor de nolan merline o empeorarlo. Pida información a barnes médico si usted actualmente fuma y necesita ayuda para dejar de fumar. Los cigarrillos electrónicos o el tabaco sin humo igualmente contienen nicotina. Consulte con barnes médico antes de utilizar estos productos.    Limite el consumo de alcohol según le indicaron.El alcohol puede provocar un dolor de nolan merline o empeorarlo. Si usted tiene michelle de nolan de racimo, no todd alcohol haley un episodio. Para otros tipos de michelle de nolan, pregúntele a barnes proveedor de atención médica si es seguro para usted beber alcohol. Pregunte cuál es la cantidad weeks que puede beber y con qué frecuencia.    Ejercítese según indicaciones.El ejercicio puede reducir la tensión y ayudarlo a aliviar el dolor de nolan. Propóngase hacer 30 minutos de actividad física curt todos los días de la semana. Barnes médico puede ayudarle a crear un plan de ejercicios.    Consuma alimentos saludables y variados.Los alimentos saludables incluyen las frutas, verduras, productos lácteos bajos en grasa, gene magras, pescado y frijoles cocidos. Barnes médico o dietista puede ayudarle a crear planes de comidas si desea evitar los alimentos que provocan michelle de nolan.  ¿Cuándo manuel buscar atención inmediata?    Usted tiene dolor intenso.    Usted tiene entumecimiento en un lado de barnes kenroy o cuerpo.    Usted tiene un dolor de nolan que ocurre después de un golpe en la nolan, komal caída u otro trauma.    Tiene dolor de nolan, está olvidadizo o confundido o tiene dificultad para hablar.    Tiene dolor de nolan, rigidez en el remi y fiebre.  ¿Cuándo manuel llamar a mi médico?    Usted tiene un dolor de nolan lyla y está vomitando.    Usted tiene dolor de nolan todos los días y no se charlie aun después de tratarlo.    Vannessa michelle de nolan cambian u ocurren nuevos síntomas cuando tiene dolor de nolan.    Usted tiene preguntas o inquietudes acerca de barnes condición o cuidado.  ACUERDOS SOBRE BARNES CUIDADO:    Usted tiene el derecho de ayudar a planear abrnes cuidado. Aprenda todo lo que pueda sobre barnes condición y javier darle tratamiento. Discuta vannessa opciones de tratamiento con vannessa médicos para decidir el cuidado que usted desea recibir. Usted siempre tiene el derecho de rechazar el tratamiento.

## 2024-05-24 NOTE — ED ADULT NURSE NOTE - OBJECTIVE STATEMENT
pt A & Ox4 c/o headache. Respirations even and unlabored. Denies chest pain or SOB. Pt son reports pt has had increasingly worsening headaches over past few days w/ worse confusion. Pt was admitted here a few weeks ago for head bleed. Neuro intact.  IV established. Blood specimens sent to lab. Medications administered as ordered.

## 2024-05-24 NOTE — ED PROVIDER NOTE - PHYSICAL EXAMINATION
Constitutional: Awake, alert, in no acute distress  Eyes: no scleral icterus, + right eye blindness  HENT: normocephalic, atraumatic, moist oral mucosa  Neck: supple  CV: RRR, no murmur  Pulm: non-labored respirations, CTAB  Abdomen: soft, non-tender, non-distended  Extremities: no edema, no deformity  Skin: no rash, no jaundice  Neuro: AAOx2, CNs II-XII intact, no facial asymmetry, 5/5 strength and sensation in all extremities, no dysmetria, stable gait

## 2024-05-24 NOTE — ED PROVIDER NOTE - CLINICAL SUMMARY MEDICAL DECISION MAKING FREE TEXT BOX
yes
58y M w/ hx recent hemorrhagic stroke, HTN; presents for intermittent headache over the past few weeks. Pt currently asymptomatic. No focal neuro deficits appreciated on exam. CT showing edema at site of recent intraparenchymal hemorrhage -- no intervention or further workup needed at this time as per neurosurgery. Pt mildly confused, but this appears to be pt's new baseline after recent stroke as per family. Medically stable for discharge with strict return precautions. Has neurology f/u appt scheduled for next month.

## 2024-05-24 NOTE — ED PROVIDER NOTE - PATIENT PORTAL LINK FT
You can access the FollowMyHealth Patient Portal offered by [facility] by registering at the following website: [email or web address]. By joining [employer]’s FollowMyHealth portal, you will also be able to view your health information using other applications (apps) compatible with our system.

## 2024-05-24 NOTE — ED PROVIDER NOTE - OBJECTIVE STATEMENT
58y M w/ hx HTN, right-eye blindness, recent stroke; presents for headache. Pt was just discharged from this hospital few weeks ago after being admitted for left thalamic intraparenchymal hemorrhage. Son states that since then pt has been intermittently confused and also sometimes complains of headache. Headaches seem to occur at night, though not every night. Pt was again complaining of headache this evening so they came to the ED. Pt currently denies any complaints. Son notes frequent urination. No fever or other concerns.

## 2024-05-24 NOTE — ED PROVIDER NOTE - PROGRESS NOTE DETAILS
Young: CT reviewed -- edema noted to area of prior hemorrhage in left thalamus without increased midline shift. CT findings discussed with neurosurgery [facility], who advises that no intervention or further workup is needed at this time. Pt remains asymptomatic. Has neurology f/u appt scheduled on 6/14. Son at bedside is comfortable taking him home. Given strict return precautions. Medically stable for discharge.

## 2024-05-24 NOTE — ED PROVIDER NOTE - NSCAREINITIATED _GEN_ER
Dr. Mary Anne Gao,    Patient has [employer] Rae O ins. [facility] does not obtain auth for PT for this insurance. The facility obtains authorization.     Thank you  Marshall Kirkland Stoney Young(Attending)

## 2024-05-25 LAB
CULTURE RESULTS: NO GROWTH — SIGNIFICANT CHANGE UP
SPECIMEN SOURCE: SIGNIFICANT CHANGE UP

## 2024-05-29 DIAGNOSIS — I10 ESSENTIAL (PRIMARY) HYPERTENSION: ICD-10-CM

## 2024-05-29 DIAGNOSIS — R41.0 DISORIENTATION, UNSPECIFIED: ICD-10-CM

## 2024-05-29 DIAGNOSIS — Z86.73 PERSONAL HISTORY OF TRANSIENT ISCHEMIC ATTACK (TIA), AND CEREBRAL INFARCTION WITHOUT RESIDUAL DEFICITS: ICD-10-CM

## 2024-05-29 DIAGNOSIS — H54.413A BLINDNESS RIGHT EYE CATEGORY 3, NORMAL VISION LEFT EYE: ICD-10-CM

## 2024-05-29 DIAGNOSIS — R51.9 HEADACHE, UNSPECIFIED: ICD-10-CM

## 2024-05-29 DIAGNOSIS — R35.0 FREQUENCY OF MICTURITION: ICD-10-CM

## 2024-08-22 PROCEDURE — 84100 ASSAY OF PHOSPHORUS: CPT

## 2024-08-22 PROCEDURE — 93970 EXTREMITY STUDY: CPT

## 2024-08-22 PROCEDURE — 96374 THER/PROPH/DIAG INJ IV PUSH: CPT

## 2024-08-22 PROCEDURE — 80048 BASIC METABOLIC PNL TOTAL CA: CPT

## 2024-08-22 PROCEDURE — 93320 DOPPLER ECHO COMPLETE: CPT

## 2024-08-22 PROCEDURE — 83036 HEMOGLOBIN GLYCOSYLATED A1C: CPT

## 2024-08-22 PROCEDURE — 71045 X-RAY EXAM CHEST 1 VIEW: CPT

## 2024-08-22 PROCEDURE — 86900 BLOOD TYPING SEROLOGIC ABO: CPT

## 2024-08-22 PROCEDURE — 87040 BLOOD CULTURE FOR BACTERIA: CPT

## 2024-08-22 PROCEDURE — 93005 ELECTROCARDIOGRAM TRACING: CPT

## 2024-08-22 PROCEDURE — 97110 THERAPEUTIC EXERCISES: CPT

## 2024-08-22 PROCEDURE — 85730 THROMBOPLASTIN TIME PARTIAL: CPT

## 2024-08-22 PROCEDURE — 82140 ASSAY OF AMMONIA: CPT

## 2024-08-22 PROCEDURE — 92610 EVALUATE SWALLOWING FUNCTION: CPT

## 2024-08-22 PROCEDURE — 93306 TTE W/DOPPLER COMPLETE: CPT

## 2024-08-22 PROCEDURE — 93325 DOPPLER ECHO COLOR FLOW MAPG: CPT

## 2024-08-22 PROCEDURE — 70498 CT ANGIOGRAPHY NECK: CPT | Mod: MC

## 2024-08-22 PROCEDURE — 76376 3D RENDER W/INTRP POSTPROCES: CPT

## 2024-08-22 PROCEDURE — 85027 COMPLETE CBC AUTOMATED: CPT

## 2024-08-22 PROCEDURE — 85610 PROTHROMBIN TIME: CPT

## 2024-08-22 PROCEDURE — T1013: CPT

## 2024-08-22 PROCEDURE — 70553 MRI BRAIN STEM W/O & W/DYE: CPT | Mod: MC

## 2024-08-22 PROCEDURE — 84480 ASSAY TRIIODOTHYRONINE (T3): CPT

## 2024-08-22 PROCEDURE — 82962 GLUCOSE BLOOD TEST: CPT

## 2024-08-22 PROCEDURE — 80061 LIPID PANEL: CPT

## 2024-08-22 PROCEDURE — 84436 ASSAY OF TOTAL THYROXINE: CPT

## 2024-08-22 PROCEDURE — 87641 MR-STAPH DNA AMP PROBE: CPT

## 2024-08-22 PROCEDURE — 95819 EEG AWAKE AND ASLEEP: CPT

## 2024-08-22 PROCEDURE — 87640 STAPH A DNA AMP PROBE: CPT

## 2024-08-22 PROCEDURE — 86901 BLOOD TYPING SEROLOGIC RH(D): CPT

## 2024-08-22 PROCEDURE — 70450 CT HEAD/BRAIN W/O DYE: CPT | Mod: MC

## 2024-08-22 PROCEDURE — 99285 EMERGENCY DEPT VISIT HI MDM: CPT

## 2024-08-22 PROCEDURE — 97167 OT EVAL HIGH COMPLEX 60 MIN: CPT

## 2024-08-22 PROCEDURE — 81001 URINALYSIS AUTO W/SCOPE: CPT

## 2024-08-22 PROCEDURE — 86850 RBC ANTIBODY SCREEN: CPT

## 2024-08-22 PROCEDURE — 80053 COMPREHEN METABOLIC PANEL: CPT

## 2024-08-22 PROCEDURE — 97116 GAIT TRAINING THERAPY: CPT

## 2024-08-22 PROCEDURE — 84484 ASSAY OF TROPONIN QUANT: CPT

## 2024-08-22 PROCEDURE — 70496 CT ANGIOGRAPHY HEAD: CPT | Mod: MC

## 2024-08-22 PROCEDURE — 85025 COMPLETE CBC W/AUTO DIFF WBC: CPT

## 2024-08-22 PROCEDURE — 84443 ASSAY THYROID STIM HORMONE: CPT

## 2024-08-22 PROCEDURE — 83735 ASSAY OF MAGNESIUM: CPT

## 2024-08-22 PROCEDURE — 93312 ECHO TRANSESOPHAGEAL: CPT

## 2024-08-22 PROCEDURE — 36415 COLL VENOUS BLD VENIPUNCTURE: CPT

## 2024-08-22 PROCEDURE — 92526 ORAL FUNCTION THERAPY: CPT
